# Patient Record
Sex: MALE | Race: WHITE | Employment: UNEMPLOYED | URBAN - METROPOLITAN AREA
[De-identification: names, ages, dates, MRNs, and addresses within clinical notes are randomized per-mention and may not be internally consistent; named-entity substitution may affect disease eponyms.]

---

## 2017-03-07 ENCOUNTER — ANTI-COAG VISIT (OUTPATIENT)
Dept: INTERNAL MEDICINE CLINIC | Age: 72
End: 2017-03-07

## 2017-03-07 DIAGNOSIS — I80.299 PHLEBITIS AND THROMBOPHLEBITIS OF OTHER DEEP VESSELS OF UNSPECIFIED LOWER EXTREMITY (HCC): Primary | ICD-10-CM

## 2017-03-07 LAB
INR BLD: 2.8
PT POC: NORMAL SEC
VALID INTERNAL CONTROL?: YES

## 2017-03-07 NOTE — PROGRESS NOTES
Mr. Sharyle Laura is here today for anticoagulation monitoring for the diagnosis of Atrial Fibrillation. His INR goal is 2.0-3.0 and his current Coumadin dose is 21.88 mg weekly. Today's findings include an INR of 2.8 (normal INR range 0.8-1.2). Considering Mr. Escobedo's past history, todays findings, and per the coumadin policy/protocol, Mr. Paola Holter was instructed to take Coumadin as follows,  Continue 21.88 mg weekly. He was also instructed to schedule an appointment in 3 weeks prior to leaving for an INR check. A full discussion of the nature of anticoagulants has been carried out. A full discussion of the need for frequent and regular monitoring, precise dosage adjustment and compliance was stressed. Side effects of potential bleeding were discussed and Mr. Paola Holter was instructed to call [phone] if there are any signs of abnormal bleeding. Mr. Paola Holter was instructed to avoid any OTC items containing aspirin or ibuprofen and prior to starting any new OTC products to consult with his physician or pharmacist to ensure no drug interactions are present. Mr. Paola Holter was instructed to avoid any major changes in his general diet and to avoid alcohol consumption. .    Mr. Paola Holter was provided a literature booklet, \"Treatment with Warfarin (Coumadin)\", that includes topics on understanding coumadin therapy, drug interaction considerations, vitamin K and coumadin use, interactions with foods and supplements containing vitamin K, and the use of herbal products. Mr. Paola Holter verbalized his understanding of all instructions and will call the office with any questions, concerns, or signs of abnormal bleeding or blood clot.

## 2017-03-31 ENCOUNTER — HOSPITAL ENCOUNTER (OUTPATIENT)
Dept: LAB | Age: 72
Discharge: HOME | End: 2017-03-31
Payer: MEDICARE

## 2017-03-31 ENCOUNTER — OFFICE VISIT (OUTPATIENT)
Dept: INTERNAL MEDICINE CLINIC | Age: 72
End: 2017-03-31

## 2017-03-31 VITALS
DIASTOLIC BLOOD PRESSURE: 60 MMHG | TEMPERATURE: 97.4 F | HEIGHT: 64 IN | SYSTOLIC BLOOD PRESSURE: 110 MMHG | RESPIRATION RATE: 16 BRPM | HEART RATE: 56 BPM | OXYGEN SATURATION: 97 % | WEIGHT: 214 LBS | BODY MASS INDEX: 36.54 KG/M2

## 2017-03-31 DIAGNOSIS — I80.299 PHLEBITIS AND THROMBOPHLEBITIS OF OTHER DEEP VESSELS OF UNSPECIFIED LOWER EXTREMITY (HCC): ICD-10-CM

## 2017-03-31 DIAGNOSIS — E78.2 MIXED HYPERLIPIDEMIA: ICD-10-CM

## 2017-03-31 DIAGNOSIS — Z12.5 SCREENING FOR PROSTATE CANCER: ICD-10-CM

## 2017-03-31 DIAGNOSIS — E11.9 CONTROLLED TYPE 2 DIABETES MELLITUS WITHOUT COMPLICATION, WITHOUT LONG-TERM CURRENT USE OF INSULIN (HCC): Primary | ICD-10-CM

## 2017-03-31 DIAGNOSIS — E11.9 CONTROLLED TYPE 2 DIABETES MELLITUS WITHOUT COMPLICATION, WITHOUT LONG-TERM CURRENT USE OF INSULIN (HCC): ICD-10-CM

## 2017-03-31 LAB
CHOLEST SERPL-MCNC: 220 MG/DL
EST. AVERAGE GLUCOSE BLD GHB EST-MCNC: 146 MG/DL
HBA1C MFR BLD: 6.7 % (ref 4.2–5.6)
HDLC SERPL-MCNC: 48 MG/DL (ref 40–60)
HDLC SERPL: 4.6 {RATIO} (ref 0–5)
INR PPP: 2.3 (ref 0.8–1.2)
LDLC SERPL CALC-MCNC: 131.8 MG/DL (ref 0–100)
LIPID PROFILE,FLP: ABNORMAL
PROTHROMBIN TIME: 23.9 SEC (ref 11.5–15.2)
PSA SERPL-MCNC: 0.5 NG/ML (ref 0–4)
TRIGL SERPL-MCNC: 201 MG/DL (ref ?–150)
VLDLC SERPL CALC-MCNC: 40.2 MG/DL

## 2017-03-31 PROCEDURE — 84153 ASSAY OF PSA TOTAL: CPT | Performed by: INTERNAL MEDICINE

## 2017-03-31 PROCEDURE — 86803 HEPATITIS C AB TEST: CPT | Performed by: INTERNAL MEDICINE

## 2017-03-31 PROCEDURE — 80061 LIPID PANEL: CPT | Performed by: INTERNAL MEDICINE

## 2017-03-31 PROCEDURE — 83036 HEMOGLOBIN GLYCOSYLATED A1C: CPT | Performed by: INTERNAL MEDICINE

## 2017-03-31 PROCEDURE — 85610 PROTHROMBIN TIME: CPT | Performed by: INTERNAL MEDICINE

## 2017-03-31 RX ORDER — CETIRIZINE HCL 10 MG
TABLET ORAL
COMMUNITY
End: 2019-04-26 | Stop reason: ALTCHOICE

## 2017-03-31 NOTE — PROGRESS NOTES
The patient presents to the office today with the chief complaint of Diabetes Mellitus    HPI    The patient remains on oral medications for type II diabetes mellitus. he does not check blood sugars at home. The patient has not had any low sugars. The patient remains on medications for chronic phlebitis in his legs. he is tolerating the medications well. His protimes have remained therapeutic. His legs remain sore with varicose veins but overall they are doing well. Review of Systems   Respiratory: Negative for cough and shortness of breath. Cardiovascular: Positive for leg swelling. Negative for chest pain. Allergies   Allergen Reactions    Keflex [Cephalexin] Itching    Penicillin V Potassium Hives       Current Outpatient Prescriptions   Medication Sig Dispense Refill    dicyclomine (BENTYL) 10 mg capsule Take 1 Cap by mouth four (4) times daily. 120 Cap 5    captopril (CAPOTEN) 25 mg tablet Take 1 Tab by mouth two (2) times a day. 60 Tab 5    glimepiride (AMARYL) 2 mg tablet TAKE ONE TABLET BY MOUTH EVERY MORNING FOR DIABETES 30 Tab 5    nadolol (CORGARD) 40 mg tablet Take 0.5 Tabs by mouth daily. 15 Tab 5    metFORMIN (GLUCOPHAGE) 1,000 mg tablet Take 1 Tab by mouth two (2) times daily (with meals). 60 Tab 5    warfarin (COUMADIN) 2.5 mg tablet Take 2 tablets on Thursday and Friday (one tablet on the other days) or as directed. 40 Tab 5    tamsulosin (FLOMAX) 0.4 mg capsule Take 1 Cap by mouth daily. 30 Cap 5    hydroCHLOROthiazide (HYDRODIURIL) 25 mg tablet Take 1 Tab by mouth daily. 30 Tab 5    clotrimazole-betamethasone (LOTRISONE) topical cream Use a small amount to affected area twice daily 45 g 3    aspirin delayed-release 81 mg tablet Take  by mouth daily.          Past Medical History:   Diagnosis Date    Allergic rhinitis     Benign paroxysmal positional vertigo     Dermatophytosis     Hypertrophy (benign) of prostate     Phlebitis and thrombophlebitis of other deep vessels of unspecified lower extremity (HCC)     Type II diabetes mellitus ([facility] [address]        Past Surgical History:   Procedure Laterality Date    HX COLONOSCOPY      polyps       Social History     Social History    Marital status: UNKNOWN     Spouse name: N/A    Number of children: N/A    Years of education: N/A     Occupational History    Not on file. Social History Main Topics    Smoking status: Former Smoker    Smokeless tobacco: Never Used    Alcohol use No    Drug use: No    Sexual activity: No     Other Topics Concern    Not on file     Social History Narrative       Patient does not have an advanced directive on file    There were no vitals taken for this visit. Physical Exam   No Cervical Lymphadenopathy  No Supraclavicular Lymphadenopathy  Thyroid is Normal  Lungs are clear to ausculation and percussion  Heart:  S1 S2 are normal, No gallops, No mummers  No Carotid Bruits  Abdomen:  Normal Bowel Sounds. No tenderness. No masses. No Hepatomegaly or Splenomegly  LE:  Strong Pedal Pulses. No Edema      DM Foot:  Diabetic foot exam:     Left: Reflexes {Reflexes:94616}     Vibratory sensation {vibration:17649}    Proprioception {propriocept:16125}   Sharp/dull discrimination {sharp dull:28666}    Filament test {filament test:75861}   Pulse DP: { :27130}   Pulse PT: { :93454}   Deformities: {None/mild/yes:22901}  Right: Reflexes {Reflexes:78217}   Vibratory sensation {vibration:72454}   Proprioception {propriocept:13285}   Sharp/dull discrimination {sharp dull:00805}   Filament test {filament test:76062}   Pulse DP: { :98320}   Pulse PT: { :68742}   Deformities: {None/mild/yes:68367}      BMI:  {MU BMI REASON:728090020}. Anti-Coag visit on 03/07/2017   Component Date Value Ref Range Status    VALID INTERNAL CONTROL POC 03/07/2017 Yes   Final    INR POC 03/07/2017 2.8   Final       .No results found for any visits on 03/31/17. Assessment / Plan      ICD-10-CM ICD-9-CM    1. Controlled type 2 diabetes mellitus without complication, without long-term current use of insulin ([facility]) E11.9 250.00    2. Phlebitis and thrombophlebitis of other deep vessels of unspecified lower extremity ([facility]) I80.299 451.19          Follow-up Disposition: Not on File    I asked Pelon Valle. if he has any questions and I answered the questions. Terrance Valle. states that he understands the treatment plan and agrees with the treatment plan

## 2017-03-31 NOTE — MR AVS SNAPSHOT
Visit Information Date & Time Provider Department Dept. Phone Encounter #  
 3/31/2017  8:30 AM Bridger Kaufman MD [facility] [phone] [id number] Follow-up Instructions Return in about 7 months (around 10/31/2017). Your Appointments 10/27/2017  8:00 AM  
Follow Up with Bridger Kaufman MD  
[facility] Appt Note: 7mo  
 [address], [address] [place] [address]  
  
   
 [address], [address] [place] [address] Upcoming Health Maintenance Date Due Hepatitis C Screening 1945 DTaP/Tdap/Td series (1 - Tdap) 2/5/1968 FOBT Q 1 YEAR AGE 50-75 2/5/1997 ZOSTER VACCINE AGE 60> 2/5/2007 Pneumococcal 65+ Low/Medium Risk (1 of 2 - PCV13) 2/5/2012 MEDICARE YEARLY EXAM 2/5/2012 INFLUENZA AGE 9 TO ADULT 8/1/2016 HEMOGLOBIN A1C Q6M 3/9/2017 LIPID PANEL Q1 4/29/2017 FOOT EXAM Q1 8/3/2017 MICROALBUMIN Q1 9/9/2017 EYE EXAM RETINAL OR DILATED Q1 12/2/2017 GLAUCOMA SCREENING Q2Y 12/2/2018 Allergies as of 3/31/2017  Review Complete On: 3/31/2017 By: Bridger Kaufman MD  
  
 Severity Noted Reaction Type Reactions Keflex [Cephalexin]  04/29/2016    Itching Lonox [Diphenoxylate-atropine]  03/31/2017    Shortness of Breath Penicillin V Potassium    Hives Current Immunizations  Never Reviewed No immunizations on file. Not reviewed this visit You Were Diagnosed With   
  
 Codes Comments Controlled type 2 diabetes mellitus without complication, without long-term current use of insulin ([facility] [address]    -  Primary ICD-10-CM: E11.9 ICD-9-CM: 250.00 Phlebitis and thrombophlebitis of other deep vessels of unspecified lower extremity (HCC)     ICD-10-CM: Z25.569 ICD-9-CM: 451.19 Screening for prostate cancer     ICD-10-CM: Z12.5 ICD-9-CM: V76.44 Mixed hyperlipidemia     ICD-10-CM: E78.2 ICD-9-CM: 272.2 Vitals BP Pulse Temp Resp Height(growth percentile) 110/60 (BP 1 Location: Right arm, BP Patient Position: Sitting) (!) 56 97.4 °F (36.3 °C) (Tympanic) 16 5' 4\" (1.626 m) Weight(growth percentile) SpO2 BMI Smoking Status 214 lb (97.1 kg) 97% 36.73 kg/m2 Former Smoker BMI and BSA Data Body Mass Index Body Surface Area  
 36.73 kg/m 2 2.09 m 2 Preferred Pharmacy Pharmacy Name [facility] [address] [place] [address] [phone] Your Updated Medication List  
  
   
This list is accurate as of: 3/31/17  9:52 AM.  Always use your most recent med list.  
  
  
  
  
 aspirin delayed-release 81 mg tablet Take  by mouth daily. captopril 25 mg tablet Commonly known as:  CAPOTEN Take 1 Tab by mouth two (2) times a day. clotrimazole-betamethasone topical cream  
Commonly known as:  Humera Santos Use a small amount to affected area twice daily  
  
 dicyclomine 10 mg capsule Commonly known as:  BENTYL Take 1 Cap by mouth four (4) times daily. glimepiride 2 mg tablet Commonly known as:  AMARYL  
TAKE ONE TABLET BY MOUTH EVERY MORNING FOR DIABETES  
  
 hydroCHLOROthiazide 25 mg tablet Commonly known as:  HYDRODIURIL Take 1 Tab by mouth daily. metFORMIN 1,000 mg tablet Commonly known as:  GLUCOPHAGE Take 1 Tab by mouth two (2) times daily (with meals). nadolol 40 mg tablet Commonly known as:  CORGARD Take 0.5 Tabs by mouth daily. tamsulosin 0.4 mg capsule Commonly known as:  FLOMAX Take 1 Cap by mouth daily. warfarin 2.5 mg tablet Commonly known as:  COUMADIN Take 2 tablets on Thursday and Friday (one tablet on the other days) or as directed. ZyrTEC 10 mg tablet Generic drug:  cetirizine Take  by mouth. Follow-up Instructions Return in about 7 months (around 10/31/2017). Introducing [facility]! Padmini Gleason introduces [email or web address] patient portal. Now you can access parts of your medical record, email your doctor's office, and request medication refills online. 1. In your internet browser, go to [email or web address] 2. Click on the First Time User? Click Here link in the Sign In box. You will see the New Member Sign Up page. 3. Enter your [email or web address] Access Code exactly as it appears below. You will not need to use this code after youve completed the sign-up process. If you do not sign up before the expiration date, you must request a new code. · [email or web address] Access Code: [email or web address] Expires: 6/5/2017  5:28 PM 
 
4. Enter the last four digits of your Social Security Number (xxxx) and Date of Birth (mm/dd/yyyy) as indicated and click Submit. You will be taken to the next sign-up page. 5. Create a [email or web address] ID. This will be your [email or web address] login ID and cannot be changed, so think of one that is secure and easy to remember. 6. Create a [email or web address] password. You can change your password at any time. 7. Enter your Password Reset Question and Answer. This can be used at a later time if you forget your password. 8. Enter your e-mail address. You will receive e-mail notification when new information is available in [address] 9. Click Sign Up. You can now view and download portions of your medical record. 10. Click the Download Summary menu link to download a portable copy of your medical information. If you have questions, please visit the Frequently Asked Questions section of the [email or web address] website. Remember, [email or web address] is NOT to be used for urgent needs. For medical emergencies, dial 911. Now available from your iPhone and Android! Please provide this summary of care documentation to your next provider. Your primary care clinician is listed as Avel Zabala. If you have any questions after today's visit, please call [phone].

## 2017-03-31 NOTE — PROGRESS NOTES
1. Have you been to the ER, urgent care clinic since your last visit? Hospitalized since your last visit? No    2. Have you seen or consulted any other health care providers outside of the [address] [place] [address] since your last visit? Include any pap smears or colon screening.  No

## 2017-03-31 NOTE — PROGRESS NOTES
This is an Initial Medicare Annual Wellness Exam (AWV) (Performed 12 months after IPPE or effective date of Medicare Part B enrollment, Once in a lifetime)    I have reviewed the patient's medical history in detail and updated the computerized patient record. History   The patient remains on type II diabetes mellitus. He is not checking his sugars at home. He has no symptoms of low sugars. The patient remains on coumadin due to phlebitis. He denies LE pain    Past Medical History:   Diagnosis Date    Allergic rhinitis     Benign paroxysmal positional vertigo     Dermatophytosis     Hypertrophy (benign) of prostate     Phlebitis and thrombophlebitis of other deep vessels of unspecified lower extremity (HCC)     Type II diabetes mellitus (HCC)       Past Surgical History:   Procedure Laterality Date    HX COLONOSCOPY      polyps     Current Outpatient Prescriptions   Medication Sig Dispense Refill    cetirizine (ZYRTEC) 10 mg tablet Take  by mouth.  dicyclomine (BENTYL) 10 mg capsule Take 1 Cap by mouth four (4) times daily. 120 Cap 5    captopril (CAPOTEN) 25 mg tablet Take 1 Tab by mouth two (2) times a day. 60 Tab 5    glimepiride (AMARYL) 2 mg tablet TAKE ONE TABLET BY MOUTH EVERY MORNING FOR DIABETES 30 Tab 5    nadolol (CORGARD) 40 mg tablet Take 0.5 Tabs by mouth daily. 15 Tab 5    metFORMIN (GLUCOPHAGE) 1,000 mg tablet Take 1 Tab by mouth two (2) times daily (with meals). 60 Tab 5    warfarin (COUMADIN) 2.5 mg tablet Take 2 tablets on Thursday and Friday (one tablet on the other days) or as directed. 40 Tab 5    tamsulosin (FLOMAX) 0.4 mg capsule Take 1 Cap by mouth daily. 30 Cap 5    hydroCHLOROthiazide (HYDRODIURIL) 25 mg tablet Take 1 Tab by mouth daily. 30 Tab 5    clotrimazole-betamethasone (LOTRISONE) topical cream Use a small amount to affected area twice daily 45 g 3    aspirin delayed-release 81 mg tablet Take  by mouth daily.        Allergies   Allergen Reactions    Keflex [Cephalexin] Itching    Lonox [Diphenoxylate-Atropine] Shortness of Breath    Penicillin V Potassium Hives     Family History   Problem Relation Age of Onset    Cancer Mother     Heart Disease Father      Social History   Substance Use Topics    Smoking status: Former Smoker    Smokeless tobacco: Never Used    Alcohol use No     Patient Active Problem List   Diagnosis Code    Dermatophytosis B35.9    Allergic rhinitis J30.9    Phlebitis and thrombophlebitis of other deep vessels of unspecified lower extremity ([facility]) I80.299    Hypertrophy (benign) of prostate N40.0    Benign paroxysmal positional vertigo H81.10    Diabetes mellitus type 2, controlled ([facility]) E11.9         Depression Risk Factor Screening:     PHQ 2 / 9, over the last two weeks 4/29/2016   Little interest or pleasure in doing things Not at all   Feeling down, depressed or hopeless Not at all   Total Score PHQ 2 0     Alcohol Risk Factor Screening: On any occasion during the past 3 months, have you had more than 3 drinks containing alcohol? No    Do you average more than 7 drinks per week? No    Functional Ability and Level of Safety:     Hearing Loss   mild-to-moderate    Activities of Daily Living   Self-care. Requires assistance with: no ADLs    Fall Risk     Fall Risk Assessment, last 12 mths 4/29/2016   Able to walk? Yes   Fall in past 12 months? No     Abuse Screen   Patient is not abused    Review of Systems   A comprehensive review of systems was negative except for that written in the HPI.     Physical Examination     No exam data present    Evaluation of Cognitive Function:  Mood/affect:  neutral  Appearance: age appropriate  Family member/caregiver input: None    Visit Vitals    /60 (BP 1 Location: Right arm, BP Patient Position: Sitting)    Pulse (!) 56    Temp 97.4 °F (36.3 °C) (Tympanic)    Resp 16    Ht 5' 4\" (1.626 m)    Wt 214 lb (97.1 kg)    SpO2 97%    BMI 36.73 kg/m2     Neck: supple, symmetrical, trachea midline, no adenopathy, thyroid: not enlarged, symmetric, no tenderness/mass/nodules, no carotid bruit and no JVD  Back: symmetric, no curvature. ROM normal. No CVA tenderness. Lungs: clear to auscultation bilaterally  Chest wall: no tenderness  Heart: regular rate and rhythm, S1, S2 normal, no murmur, click, rub or gallop  Abdomen: soft, non-tender. Bowel sounds normal. No masses,  no organomegaly  Extremities: extremities normal, atraumatic, no cyanosis or edema  Pulses: 2+ and symmetric    Patient Care Team:  Ramsey Huang MD as PCP - General (Internal Medicine)    Advice/Referrals/Counseling   Education and counseling provided:  Are appropriate based on today's review and evaluation  The patient was advised and counseled regarding advanced directives    Assessment/Plan       ICD-10-CM ICD-9-CM    1. Controlled type 2 diabetes mellitus without complication, without long-term current use of insulin (HCC) E11.9 250.00 cetirizine (ZYRTEC) 10 mg tablet      HEPATITIS C AB      LIPID PANEL      PSA SCREENING (SCREENING)      HEMOGLOBIN A1C WITH EAG      MICROALBUMIN, UR, RAND W/ MICROALBUMIN/CREA RATIO   2. Phlebitis and thrombophlebitis of other deep vessels of unspecified lower extremity (HCC) I80.299 451.19 cetirizine (ZYRTEC) 10 mg tablet      HEPATITIS C AB      LIPID PANEL      PSA SCREENING (SCREENING)      HEMOGLOBIN A1C WITH EAG      MICROALBUMIN, UR, RAND W/ MICROALBUMIN/CREA RATIO      PROTHROMBIN TIME + INR   3. Screening for prostate cancer Z12.5 V76.44 cetirizine (ZYRTEC) 10 mg tablet      HEPATITIS C AB      LIPID PANEL      PSA SCREENING (SCREENING)      HEMOGLOBIN A1C WITH EAG      MICROALBUMIN, UR, RAND W/ MICROALBUMIN/CREA RATIO   4. Mixed hyperlipidemia E78.2 272.2 cetirizine (ZYRTEC) 10 mg tablet      HEPATITIS C AB      LIPID PANEL      PSA SCREENING (SCREENING)      HEMOGLOBIN A1C WITH EAG      MICROALBUMIN, UR, RAND W/ MICROALBUMIN/CREA RATIO   .   Labs  he was advised to continue his maintenance medications    Next appointment:  6 months    I asked Ernie Sandra Cha. if he has any questions and I answered the questions. Linda Pal Cha. states that he understands the treatment plan and agrees with the treatment plan

## 2017-04-03 LAB
HCV AB SER IA-ACNC: 0.1 INDEX
HCV AB SERPL QL IA: NEGATIVE
HCV COMMENT,HCGAC: NORMAL

## 2017-05-01 ENCOUNTER — ANTI-COAG VISIT (OUTPATIENT)
Dept: INTERNAL MEDICINE CLINIC | Age: 72
End: 2017-05-01

## 2017-05-01 DIAGNOSIS — I80.299 PHLEBITIS AND THROMBOPHLEBITIS OF OTHER DEEP VESSELS OF UNSPECIFIED LOWER EXTREMITY (HCC): ICD-10-CM

## 2017-05-01 LAB
INR BLD: 3.5
PT POC: NORMAL SEC
VALID INTERNAL CONTROL?: YES

## 2017-05-01 NOTE — PROGRESS NOTES
Mr. Conrad Frey is here today for anticoagulation monitoring for the diagnosis of DVT. His INR goal is 2.0-3.0 and his current Coumadin dose is 21.25 mg weekly. Today's findings include an INR of 3.5 (normal INR range 0.8-1.2). Considering Mr. Escobedo's past history, todays findings, and per the coumadin policy/protocol, Mr. Sherry Jenkins was instructed to take Coumadin as follows,  To hold two days then return to the same dose. He was also instructed to schedule an appointment in 1.5 weeks prior to leaving for an INR check. A full discussion of the nature of anticoagulants has been carried out. A full discussion of the need for frequent and regular monitoring, precise dosage adjustment and compliance was stressed. Side effects of potential bleeding were discussed and Mr. Sherry Jenkins was instructed to call [phone] if there are any signs of abnormal bleeding. Mr. Sherry Jenkins was instructed to avoid any OTC items containing aspirin or ibuprofen and prior to starting any new OTC products to consult with his physician or pharmacist to ensure no drug interactions are present. Mr. Sherry Jenkins was instructed to avoid any major changes in his general diet and to avoid alcohol consumption. .    Mr. Sherry Jenkins was provided a literature booklet, \"Treatment with Warfarin (Coumadin)\", that includes topics on understanding coumadin therapy, drug interaction considerations, vitamin K and coumadin use, interactions with foods and supplements containing vitamin K, and the use of herbal products. Mr. Sherry Jenkins verbalized his understanding of all instructions and will call the office with any questions, concerns, or signs of abnormal bleeding or blood clot.

## 2017-05-16 ENCOUNTER — ANTI-COAG VISIT (OUTPATIENT)
Dept: INTERNAL MEDICINE CLINIC | Age: 72
End: 2017-05-16

## 2017-05-16 DIAGNOSIS — I80.299 PHLEBITIS AND THROMBOPHLEBITIS OF OTHER DEEP VESSELS OF UNSPECIFIED LOWER EXTREMITY (HCC): ICD-10-CM

## 2017-05-16 LAB
INR BLD: 2.4
PT POC: NORMAL SEC
VALID INTERNAL CONTROL?: YES

## 2017-05-16 NOTE — PATIENT INSTRUCTIONS
Taking Warfarin Safely: Care Instructions  Your Care Instructions  Warfarin is a medicine that you take to prevent blood clots. It is often called a blood thinner. Doctors give warfarin (such as Coumadin) to reduce the risk of blood clots. You may be at risk for blood clots if you have atrial fibrillation or deep vein thrombosis. Some other health problems may also put you at risk. Warfarin slows the amount of time it takes for your blood to clot. It can cause bleeding problems. Even if you've been taking warfarin for a while, it's important to know how to take it safely. Foods and other medicines can affect the way warfarin works. Some can make warfarin work too well. This can cause bleeding problems. And some can make it work poorly, so that it does not prevent blood clots very well. You will need regular blood tests to check how long it takes for your blood to form a clot. This test is called a PT or prothrombin time test. The result of the test is called an INR level. Depending on the test results, your doctor or anticoagulation clinic may adjust your dose of warfarin. Follow-up care is a key part of your treatment and safety. Be sure to make and go to all appointments, and call your doctor if you are having problems. It's also a good idea to know your test results and keep a list of the medicines you take. How can you care for yourself at home? Take warfarin safely  · Take your warfarin at the same time each day. · If you miss a dose of warfarin, don't take an extra dose to make up for it. Your doctor can tell you exactly what to do so you don't take too much or too little. · Wear medical alert jewelry that lets others know that you take warfarin. You can buy this at most drugstores. · Don't take warfarin if you are pregnant or planning to get pregnant. Talk to your doctor about how you can prevent getting pregnant while you are taking it.   · Don't change your dose or stop taking warfarin unless your doctor tells you to. Effects of medicines and food on warfarin  · Don't start or stop taking any medicines, vitamins, or natural remedies unless you first talk to your doctor. Many medicines can affect how warfarin works. These include aspirin and other pain relievers, over-the-counter medicines, multivitamins, dietary supplements, and herbal products. · Tell all of your doctors and pharmacists that you take warfarin. Some prescription medicines can affect how warfarin works. · Keep the amount of vitamin K in your diet about the same from day to day. Do not suddenly eat a lot more or a lot less food that is rich in vitamin K than you usually do. Vitamin K affects how warfarin works and how your blood clots. Talk with your doctor before making big changes in your diet. Foods that have a lot of vitamin K include cooked green vegetables, such as:  ¨ Kale, spinach, turnip greens, gaby greens, Swiss chard, and mustard greens. ¨ [place] sprouts, broccoli, and asparagus. · Limit your use of alcohol. Avoid bleeding by preventing falls and injuries  · Wear slippers or shoes with nonskid soles. · Remove throw rugs and clutter. · Rearrange furniture and electrical cords to keep them out of walking paths. · Keep stairways, porches, and outside walkways well lit. Use night-lights in hallways and bathrooms. · Be extra careful when you work with sharp tools or knives. When should you call for help? Call 911 anytime you think you may need emergency care. For example, call if:  · You have a sudden, severe headache that is different from past headaches. Call your doctor now or seek immediate medical care if:  · You have any abnormal bleeding, such as:  ¨ Nosebleeds. ¨ Vaginal bleeding that is different (heavier, more frequent, at a different time of the month) than what you are used to. ¨ Bloody or black stools, or rectal bleeding. ¨ Bloody or pink urine.   Watch closely for changes in your health, and be sure to contact your doctor if you have any problems. Where can you learn more? Go to http://harsh-kirsten.info/. Enter C928 in the search box to learn more about \"Taking Warfarin Safely: Care Instructions. \"  Current as of: November 15, 2016  Content Version: 11.2  © 6696-1777 [employer]. Care instructions adapted under license by [employer] (which disclaims liability or warranty for this information). If you have questions about a medical condition or this instruction, always ask your healthcare professional. [address] any warranty or liability for your use of this information.

## 2017-05-16 NOTE — PROGRESS NOTES
Mr. 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 His INR goal is 2.0-3.0 and his current Coumadin dose is 2.5 mg and 3.75 mg alternating. Today's findings include an INR of 2.4 (normal INR range 0.8-1.2). Considering Mr. Escobedo's past history, todays findings, and per the coumadin policy/protocol, Mr. Dennie Precise was instructed to take Coumadin as follows,  2.5 mg and 3.75 mg alternating. He was also instructed to schedule an appointment in 2 weeks prior to leaving for an INR check. A full discussion of the nature of anticoagulants has been carried out. A full discussion of the need for frequent and regular monitoring, precise dosage adjustment and compliance was stressed. Side effects of potential bleeding were discussed and Mr. Dennie Precise was instructed to call [phone] if there are any signs of abnormal bleeding. Mr. Dennie Precise was instructed to avoid any OTC items containing aspirin or ibuprofen and prior to starting any new OTC products to consult with his physician or pharmacist to ensure no drug interactions are present. Mr. Dennie Precise was instructed to avoid any major changes in his general diet and to avoid alcohol consumption. .      Mr. Escobedo verbalized his understanding of all instructions and will call the office with any questions, concerns, or signs of abnormal bleeding or blood clot.

## 2017-05-30 ENCOUNTER — ANTI-COAG VISIT (OUTPATIENT)
Dept: INTERNAL MEDICINE CLINIC | Age: 72
End: 2017-05-30

## 2017-05-30 DIAGNOSIS — I10 ESSENTIAL HYPERTENSION WITH GOAL BLOOD PRESSURE LESS THAN 140/90: ICD-10-CM

## 2017-05-30 DIAGNOSIS — Z12.5 PROSTATE CANCER SCREENING: ICD-10-CM

## 2017-05-30 DIAGNOSIS — I80.299 PHLEBITIS AND THROMBOPHLEBITIS OF OTHER DEEP VESSELS OF UNSPECIFIED LOWER EXTREMITY (HCC): ICD-10-CM

## 2017-05-30 LAB
INR BLD: 2.9
PT POC: NORMAL SECONDS
VALID INTERNAL CONTROL?: YES

## 2017-05-30 RX ORDER — DICYCLOMINE HYDROCHLORIDE 10 MG/1
10 CAPSULE ORAL 4 TIMES DAILY
Qty: 120 CAP | Refills: 5 | Status: SHIPPED | OUTPATIENT
Start: 2017-05-30 | End: 2017-11-28 | Stop reason: SDUPTHER

## 2017-05-30 RX ORDER — WARFARIN 2.5 MG/1
TABLET ORAL
Qty: 40 TAB | Refills: 5 | Status: SHIPPED | OUTPATIENT
Start: 2017-05-30 | End: 2017-11-28 | Stop reason: SDUPTHER

## 2017-05-30 RX ORDER — METFORMIN HYDROCHLORIDE 1000 MG/1
1000 TABLET ORAL 2 TIMES DAILY WITH MEALS
Qty: 60 TAB | Refills: 5 | Status: SHIPPED | OUTPATIENT
Start: 2017-05-30 | End: 2017-11-28 | Stop reason: SDUPTHER

## 2017-05-30 RX ORDER — NADOLOL 40 MG/1
20 TABLET ORAL DAILY
Qty: 15 TAB | Refills: 5 | Status: SHIPPED | OUTPATIENT
Start: 2017-05-30 | End: 2017-11-28 | Stop reason: SDUPTHER

## 2017-05-30 RX ORDER — CAPTOPRIL 25 MG/1
25 TABLET ORAL 2 TIMES DAILY
Qty: 60 TAB | Refills: 5 | Status: SHIPPED | OUTPATIENT
Start: 2017-05-30 | End: 2017-11-28 | Stop reason: SDUPTHER

## 2017-05-30 RX ORDER — HYDROCHLOROTHIAZIDE 25 MG/1
25 TABLET ORAL DAILY
Qty: 30 TAB | Refills: 5 | Status: SHIPPED | OUTPATIENT
Start: 2017-05-30 | End: 2017-11-28 | Stop reason: SDUPTHER

## 2017-05-30 RX ORDER — TAMSULOSIN HYDROCHLORIDE 0.4 MG/1
0.4 CAPSULE ORAL DAILY
Qty: 30 CAP | Refills: 5 | Status: SHIPPED | OUTPATIENT
Start: 2017-05-30 | End: 2017-11-28 | Stop reason: SDUPTHER

## 2017-05-30 RX ORDER — GLIMEPIRIDE 2 MG/1
TABLET ORAL
Qty: 30 TAB | Refills: 5 | Status: SHIPPED | OUTPATIENT
Start: 2017-05-30 | End: 2017-11-28 | Stop reason: SDUPTHER

## 2017-05-30 NOTE — PROGRESS NOTES
Mr. Mir El is here today for anticoagulation monitoring for the diagnosis of Thrombophlebitis. His INR goal is 2.0-3.0 and his current Coumadin dose is 2.5 mg alternating with 3.7 mg.     Today's findings include an INR of 2.9 (normal INR range 0.8-1.2). Considering Mr. Escobedo's past history, todays findings, and per the coumadin policy/protocol, Mr. Rafy Lyn was instructed to take Coumadin as follows,  2.5 mg alternating with 3.7 mg.  He was also instructed to schedule an appointment in 2 1/2 weeks prior to leaving for an INR check. A full discussion of the nature of anticoagulants has been carried out. A full discussion of the need for frequent and regular monitoring, precise dosage adjustment and compliance was stressed. Side effects of potential bleeding were discussed and Mr. Rafy Lyn was instructed to call [phone] if there are any signs of abnormal bleeding. Mr. Rafy Lyn was instructed to avoid any OTC items containing aspirin or ibuprofen and prior to starting any new OTC products to consult with his physician or pharmacist to ensure no drug interactions are present. Mr. Rafy Lyn was instructed to avoid any major changes in his general diet and to avoid alcohol consumption. .      Mr. Escobedo verbalized his understanding of all instructions and will call the office with any questions, concerns, or signs of abnormal bleeding or blood clot.

## 2017-05-30 NOTE — PATIENT INSTRUCTIONS
Taking Warfarin Safely: Care Instructions  Your Care Instructions  Warfarin is a medicine that you take to prevent blood clots. It is often called a blood thinner. Doctors give warfarin (such as Coumadin) to reduce the risk of blood clots. You may be at risk for blood clots if you have atrial fibrillation or deep vein thrombosis. Some other health problems may also put you at risk. Warfarin slows the amount of time it takes for your blood to clot. It can cause bleeding problems. Even if you've been taking warfarin for a while, it's important to know how to take it safely. Foods and other medicines can affect the way warfarin works. Some can make warfarin work too well. This can cause bleeding problems. And some can make it work poorly, so that it does not prevent blood clots very well. You will need regular blood tests to check how long it takes for your blood to form a clot. This test is called a PT or prothrombin time test. The result of the test is called an INR level. Depending on the test results, your doctor or anticoagulation clinic may adjust your dose of warfarin. Follow-up care is a key part of your treatment and safety. Be sure to make and go to all appointments, and call your doctor if you are having problems. It's also a good idea to know your test results and keep a list of the medicines you take. How can you care for yourself at home? Take warfarin safely  · Take your warfarin at the same time each day. · If you miss a dose of warfarin, don't take an extra dose to make up for it. Your doctor can tell you exactly what to do so you don't take too much or too little. · Wear medical alert jewelry that lets others know that you take warfarin. You can buy this at most drugstores. · Don't take warfarin if you are pregnant or planning to get pregnant. Talk to your doctor about how you can prevent getting pregnant while you are taking it.   · Don't change your dose or stop taking warfarin unless your doctor tells you to. Effects of medicines and food on warfarin  · Don't start or stop taking any medicines, vitamins, or natural remedies unless you first talk to your doctor. Many medicines can affect how warfarin works. These include aspirin and other pain relievers, over-the-counter medicines, multivitamins, dietary supplements, and herbal products. · Tell all of your doctors and pharmacists that you take warfarin. Some prescription medicines can affect how warfarin works. · Keep the amount of vitamin K in your diet about the same from day to day. Do not suddenly eat a lot more or a lot less food that is rich in vitamin K than you usually do. Vitamin K affects how warfarin works and how your blood clots. Talk with your doctor before making big changes in your diet. Foods that have a lot of vitamin K include cooked green vegetables, such as:  ¨ Kale, spinach, turnip greens, gaby greens, Swiss chard, and mustard greens. ¨ [place] sprouts, broccoli, and asparagus. · Limit your use of alcohol. Avoid bleeding by preventing falls and injuries  · Wear slippers or shoes with nonskid soles. · Remove throw rugs and clutter. · Rearrange furniture and electrical cords to keep them out of walking paths. · Keep stairways, porches, and outside walkways well lit. Use night-lights in hallways and bathrooms. · Be extra careful when you work with sharp tools or knives. When should you call for help? Call 911 anytime you think you may need emergency care. For example, call if:  · You have a sudden, severe headache that is different from past headaches. Call your doctor now or seek immediate medical care if:  · You have any abnormal bleeding, such as:  ¨ Nosebleeds. ¨ Vaginal bleeding that is different (heavier, more frequent, at a different time of the month) than what you are used to. ¨ Bloody or black stools, or rectal bleeding. ¨ Bloody or pink urine.   Watch closely for changes in your health, and be sure to contact your doctor if you have any problems. Where can you learn more? Go to http://harsh-kirsten.info/. Enter Z827 in the search box to learn more about \"Taking Warfarin Safely: Care Instructions. \"  Current as of: November 15, 2016  Content Version: 11.2  © 3196-2488 [employer]. Care instructions adapted under license by [employer] (which disclaims liability or warranty for this information). If you have questions about a medical condition or this instruction, always ask your healthcare professional. [address] any warranty or liability for your use of this information.

## 2017-05-30 NOTE — TELEPHONE ENCOUNTER
Requested Prescriptions     Pending Prescriptions Disp Refills    captopril (CAPOTEN) 25 mg tablet 60 Tab 5     Sig: Take 1 Tab by mouth two (2) times a day.  nadolol (CORGARD) 40 mg tablet 15 Tab 5     Sig: Take 0.5 Tabs by mouth daily.  hydroCHLOROthiazide (HYDRODIURIL) 25 mg tablet 30 Tab 5     Sig: Take 1 Tab by mouth daily.  metFORMIN (GLUCOPHAGE) 1,000 mg tablet 60 Tab 5     Sig: Take 1 Tab by mouth two (2) times daily (with meals).  glimepiride (AMARYL) 2 mg tablet 30 Tab 5     Sig: TAKE ONE TABLET BY MOUTH EVERY MORNING FOR DIABETES    dicyclomine (BENTYL) 10 mg capsule 120 Cap 5     Sig: Take 1 Cap by mouth four (4) times daily.  tamsulosin (FLOMAX) 0.4 mg capsule 30 Cap 5     Sig: Take 1 Cap by mouth daily.  warfarin (COUMADIN) 2.5 mg tablet 40 Tab 5     Sig: Take 2 tablets on Thursday and Friday (one tablet on the other days) or as directed.

## 2017-06-22 ENCOUNTER — ANTI-COAG VISIT (OUTPATIENT)
Dept: INTERNAL MEDICINE CLINIC | Age: 72
End: 2017-06-22

## 2017-06-22 DIAGNOSIS — I80.299 PHLEBITIS AND THROMBOPHLEBITIS OF OTHER DEEP VESSELS OF UNSPECIFIED LOWER EXTREMITY (HCC): ICD-10-CM

## 2017-06-22 LAB
INR BLD: 2.3
PT POC: NORMAL SECONDS
VALID INTERNAL CONTROL?: YES

## 2017-06-22 NOTE — PROGRESS NOTES
Mr. Adriel Patiño is here today for anticoagulation monitoring for the diagnosis of thrombophlebitis. His INR goal is 2.0-3.0 and his current Coumadin dose is 2.3 and 3.75 alternating. Today's findings include an INR of 2.3 (normal INR range 0.8-1.2). Considering Mr. Escobedo's past history, todays findings, and per the coumadin policy/protocol, Mr. Carolyn Wooten was instructed to take Coumadin as follows,  2.3 mg and 3.75 mg alternating. He was also instructed to schedule an appointment in 3 weeks prior to leaving for an INR check. A full discussion of the nature of anticoagulants has been carried out. A full discussion of the need for frequent and regular monitoring, precise dosage adjustment and compliance was stressed. Side effects of potential bleeding were discussed and Mr. Carolyn Wooten was instructed to call [phone] if there are any signs of abnormal bleeding. Mr. Carolyn Wooten was instructed to avoid any OTC items containing aspirin or ibuprofen and prior to starting any new OTC products to consult with his physician or pharmacist to ensure no drug interactions are present. Mr. Carolyn Wooten was instructed to avoid any major changes in his general diet and to avoid alcohol consumption. .      Mr. Escobedo verbalized his understanding of all instructions and will call the office with any questions, concerns, or signs of abnormal bleeding or blood clot.

## 2017-06-22 NOTE — PATIENT INSTRUCTIONS
Taking Warfarin Safely: Care Instructions  Your Care Instructions  Warfarin is a medicine that you take to prevent blood clots. It is often called a blood thinner. Doctors give warfarin (such as Coumadin) to reduce the risk of blood clots. You may be at risk for blood clots if you have atrial fibrillation or deep vein thrombosis. Some other health problems may also put you at risk. Warfarin slows the amount of time it takes for your blood to clot. It can cause bleeding problems. Even if you've been taking warfarin for a while, it's important to know how to take it safely. Foods and other medicines can affect the way warfarin works. Some can make warfarin work too well. This can cause bleeding problems. And some can make it work poorly, so that it does not prevent blood clots very well. You will need regular blood tests to check how long it takes for your blood to form a clot. This test is called a PT or prothrombin time test. The result of the test is called an INR level. Depending on the test results, your doctor or anticoagulation clinic may adjust your dose of warfarin. Follow-up care is a key part of your treatment and safety. Be sure to make and go to all appointments, and call your doctor if you are having problems. It's also a good idea to know your test results and keep a list of the medicines you take. How can you care for yourself at home? Take warfarin safely  · Take your warfarin at the same time each day. · If you miss a dose of warfarin, don't take an extra dose to make up for it. Your doctor can tell you exactly what to do so you don't take too much or too little. · Wear medical alert jewelry that lets others know that you take warfarin. You can buy this at most drugstores. · Don't take warfarin if you are pregnant or planning to get pregnant. Talk to your doctor about how you can prevent getting pregnant while you are taking it.   · Don't change your dose or stop taking warfarin unless your doctor tells you to. Effects of medicines and food on warfarin  · Don't start or stop taking any medicines, vitamins, or natural remedies unless you first talk to your doctor. Many medicines can affect how warfarin works. These include aspirin and other pain relievers, over-the-counter medicines, multivitamins, dietary supplements, and herbal products. · Tell all of your doctors and pharmacists that you take warfarin. Some prescription medicines can affect how warfarin works. · Keep the amount of vitamin K in your diet about the same from day to day. Do not suddenly eat a lot more or a lot less food that is rich in vitamin K than you usually do. Vitamin K affects how warfarin works and how your blood clots. Talk with your doctor before making big changes in your diet. Foods that have a lot of vitamin K include cooked green vegetables, such as:  ¨ Kale, spinach, turnip greens, gaby greens, Swiss chard, and mustard greens. ¨ [place] sprouts, broccoli, and asparagus. · Limit your use of alcohol. Avoid bleeding by preventing falls and injuries  · Wear slippers or shoes with nonskid soles. · Remove throw rugs and clutter. · Rearrange furniture and electrical cords to keep them out of walking paths. · Keep stairways, porches, and outside walkways well lit. Use night-lights in hallways and bathrooms. · Be extra careful when you work with sharp tools or knives. When should you call for help? Call 911 anytime you think you may need emergency care. For example, call if:  · You have a sudden, severe headache that is different from past headaches. Call your doctor now or seek immediate medical care if:  · You have any abnormal bleeding, such as:  ¨ Nosebleeds. ¨ Vaginal bleeding that is different (heavier, more frequent, at a different time of the month) than what you are used to. ¨ Bloody or black stools, or rectal bleeding. ¨ Bloody or pink urine.   Watch closely for changes in your health, and be sure to contact your doctor if you have any problems. Where can you learn more? Go to http://harsh-kirsten.info/. Enter A694 in the search box to learn more about \"Taking Warfarin Safely: Care Instructions. \"  Current as of: November 15, 2016  Content Version: 11.3  © 6845-5482 [employer]. Care instructions adapted under license by [employer] (which disclaims liability or warranty for this information). If you have questions about a medical condition or this instruction, always ask your healthcare professional. [address] any warranty or liability for your use of this information.

## 2017-07-26 ENCOUNTER — ANTI-COAG VISIT (OUTPATIENT)
Dept: INTERNAL MEDICINE CLINIC | Age: 72
End: 2017-07-26

## 2017-07-26 DIAGNOSIS — I80.299 PHLEBITIS AND THROMBOPHLEBITIS OF OTHER DEEP VESSELS OF UNSPECIFIED LOWER EXTREMITY (HCC): ICD-10-CM

## 2017-07-26 LAB
INR BLD: 3.7
PT POC: NORMAL SECONDS
VALID INTERNAL CONTROL?: YES

## 2017-07-26 NOTE — PROGRESS NOTES
Mr. Tiffany Dumont is here today for anticoagulation monitoring for the diagnosis of thrombophlebitis. His INR goal is 2.0-3.0 and his current Coumadin dose is 2.5 mg and 3.75 mg alternating. Today's findings include an INR of 3.7 (normal INR range 0.8-1.2). Considering Mr. Escobedo's past history, todays findings, and per the coumadin policy/protocol, Mr. Sushil Villanueva was instructed to take Coumadin as follows,  Hold for 2 days then resume 2.5 mg and 3.75 mg alternating. He was also instructed to schedule an appointment in 2 weeks prior to leaving for an INR check. A full discussion of the nature of anticoagulants has been carried out. A full discussion of the need for frequent and regular monitoring, precise dosage adjustment and compliance was stressed. Side effects of potential bleeding were discussed and Mr. Sushil Villanueva was instructed to call [phone] if there are any signs of abnormal bleeding. Mr. Sushil Villanueva was instructed to avoid any OTC items containing aspirin or ibuprofen and prior to starting any new OTC products to consult with his physician or pharmacist to ensure no drug interactions are present. Mr. Sushil Villanueva was instructed to avoid any major changes in his general diet and to avoid alcohol consumption. .      Mr. Escobedo verbalized his understanding of all instructions and will call the office with any questions, concerns, or signs of abnormal bleeding or blood clot.

## 2017-07-26 NOTE — PATIENT INSTRUCTIONS
Taking Warfarin Safely: Care Instructions  Your Care Instructions  Warfarin is a medicine that you take to prevent blood clots. It is often called a blood thinner. Doctors give warfarin (such as Coumadin) to reduce the risk of blood clots. You may be at risk for blood clots if you have atrial fibrillation or deep vein thrombosis. Some other health problems may also put you at risk. Warfarin slows the amount of time it takes for your blood to clot. It can cause bleeding problems. Even if you've been taking warfarin for a while, it's important to know how to take it safely. Foods and other medicines can affect the way warfarin works. Some can make warfarin work too well. This can cause bleeding problems. And some can make it work poorly, so that it does not prevent blood clots very well. You will need regular blood tests to check how long it takes for your blood to form a clot. This test is called a PT or prothrombin time test. The result of the test is called an INR level. Depending on the test results, your doctor or anticoagulation clinic may adjust your dose of warfarin. Follow-up care is a key part of your treatment and safety. Be sure to make and go to all appointments, and call your doctor if you are having problems. It's also a good idea to know your test results and keep a list of the medicines you take. How can you care for yourself at home? Take warfarin safely  · Take your warfarin at the same time each day. · If you miss a dose of warfarin, don't take an extra dose to make up for it. Your doctor can tell you exactly what to do so you don't take too much or too little. · Wear medical alert jewelry that lets others know that you take warfarin. You can buy this at most drugstores. · Don't take warfarin if you are pregnant or planning to get pregnant. Talk to your doctor about how you can prevent getting pregnant while you are taking it.   · Don't change your dose or stop taking warfarin unless your doctor tells you to. Effects of medicines and food on warfarin  · Don't start or stop taking any medicines, vitamins, or natural remedies unless you first talk to your doctor. Many medicines can affect how warfarin works. These include aspirin and other pain relievers, over-the-counter medicines, multivitamins, dietary supplements, and herbal products. · Tell all of your doctors and pharmacists that you take warfarin. Some prescription medicines can affect how warfarin works. · Keep the amount of vitamin K in your diet about the same from day to day. Do not suddenly eat a lot more or a lot less food that is rich in vitamin K than you usually do. Vitamin K affects how warfarin works and how your blood clots. Talk with your doctor before making big changes in your diet. Foods that have a lot of vitamin K include cooked green vegetables, such as:  ¨ Kale, spinach, turnip greens, gaby greens, Swiss chard, and mustard greens. ¨ Limestone sprouts, broccoli, and asparagus. · Limit your use of alcohol. Avoid bleeding by preventing falls and injuries  · Wear slippers or shoes with nonskid soles. · Remove throw rugs and clutter. · Rearrange furniture and electrical cords to keep them out of walking paths. · Keep stairways, porches, and outside walkways well lit. Use night-lights in hallways and bathrooms. · Be extra careful when you work with sharp tools or knives. When should you call for help? Call 911 anytime you think you may need emergency care. For example, call if:  · You have a sudden, severe headache that is different from past headaches. Call your doctor now or seek immediate medical care if:  · You have any abnormal bleeding, such as:  ¨ Nosebleeds. ¨ Vaginal bleeding that is different (heavier, more frequent, at a different time of the month) than what you are used to. ¨ Bloody or black stools, or rectal bleeding. ¨ Bloody or pink urine.   Watch closely for changes in your health, and be sure to contact your doctor if you have any problems. Where can you learn more? Go to http://harsh-kirsten.info/. Enter Y235 in the search box to learn more about \"Taking Warfarin Safely: Care Instructions. \"  Current as of: November 15, 2016  Content Version: 11.3  © 4403-2199 [employer]. Care instructions adapted under license by [employer] (which disclaims liability or warranty for this information). If you have questions about a medical condition or this instruction, always ask your healthcare professional. [address] any warranty or liability for your use of this information.

## 2017-08-07 ENCOUNTER — OFFICE VISIT (OUTPATIENT)
Dept: INTERNAL MEDICINE CLINIC | Age: 72
End: 2017-08-07

## 2017-08-07 ENCOUNTER — HOSPITAL ENCOUNTER (OUTPATIENT)
Dept: LAB | Age: 72
Discharge: HOME | End: 2017-08-07
Payer: MEDICARE

## 2017-08-07 VITALS
TEMPERATURE: 100.2 F | DIASTOLIC BLOOD PRESSURE: 50 MMHG | HEART RATE: 97 BPM | SYSTOLIC BLOOD PRESSURE: 120 MMHG | OXYGEN SATURATION: 96 % | RESPIRATION RATE: 16 BRPM | HEIGHT: 64 IN

## 2017-08-07 DIAGNOSIS — N30.01 ACUTE CYSTITIS WITH HEMATURIA: ICD-10-CM

## 2017-08-07 DIAGNOSIS — N30.01 ACUTE CYSTITIS WITH HEMATURIA: Primary | ICD-10-CM

## 2017-08-07 DIAGNOSIS — Z79.01 ANTICOAGULANT LONG-TERM USE: ICD-10-CM

## 2017-08-07 LAB
BILIRUB UR QL STRIP: NORMAL
GLUCOSE UR-MCNC: NEGATIVE MG/DL
INR BLD: 2.8
KETONES P FAST UR STRIP-MCNC: NORMAL MG/DL
PH UR STRIP: 5 [PH] (ref 4.6–8)
PROT UR QL STRIP: NORMAL MG/DL
PT POC: NORMAL SECONDS
SP GR UR STRIP: 1.03 (ref 1–1.03)
UA UROBILINOGEN AMB POC: NORMAL (ref 0.2–1)
URINALYSIS CLARITY POC: NORMAL
URINALYSIS COLOR POC: YELLOW
URINE BLOOD POC: NORMAL
URINE LEUKOCYTES POC: NORMAL
URINE NITRITES POC: POSITIVE
VALID INTERNAL CONTROL?: YES

## 2017-08-07 PROCEDURE — 87186 SC STD MICRODIL/AGAR DIL: CPT | Performed by: NURSE PRACTITIONER

## 2017-08-07 PROCEDURE — 87077 CULTURE AEROBIC IDENTIFY: CPT | Performed by: NURSE PRACTITIONER

## 2017-08-07 PROCEDURE — 87086 URINE CULTURE/COLONY COUNT: CPT | Performed by: NURSE PRACTITIONER

## 2017-08-07 RX ORDER — CIPROFLOXACIN 250 MG/1
250 TABLET, FILM COATED ORAL EVERY 12 HOURS
Qty: 14 TAB | Refills: 0 | Status: SHIPPED | OUTPATIENT
Start: 2017-08-07 | End: 2017-08-14 | Stop reason: ALTCHOICE

## 2017-08-07 NOTE — PROGRESS NOTES
Ace Alfonso is a 81 y.o. male presenting today for Urinary Burning and Urgency  . HPI:  Ace Alfonso presents to the office today for dysuria, frequency and urgency x 3 days. He denies any hematuria. Review of Systems   Constitutional: Positive for fever. Respiratory: Negative for cough and shortness of breath. Cardiovascular: Negative for chest pain and palpitations. Gastrointestinal: Negative for nausea. Genitourinary: Positive for dysuria, frequency and urgency. Negative for hematuria. Allergies   Allergen Reactions    Keflex [Cephalexin] Itching    Lonox [Diphenoxylate-Atropine] Shortness of Breath    Penicillin V Potassium Hives       Current Outpatient Prescriptions   Medication Sig Dispense Refill    ciprofloxacin HCl (CIPRO) 250 mg tablet Take 1 Tab by mouth every twelve (12) hours for 7 days. 14 Tab 0    captopril (CAPOTEN) 25 mg tablet Take 1 Tab by mouth two (2) times a day. 60 Tab 5    nadolol (CORGARD) 40 mg tablet Take 0.5 Tabs by mouth daily. 15 Tab 5    hydroCHLOROthiazide (HYDRODIURIL) 25 mg tablet Take 1 Tab by mouth daily. 30 Tab 5    metFORMIN (GLUCOPHAGE) 1,000 mg tablet Take 1 Tab by mouth two (2) times daily (with meals). 60 Tab 5    glimepiride (AMARYL) 2 mg tablet TAKE ONE TABLET BY MOUTH EVERY MORNING FOR DIABETES 30 Tab 5    dicyclomine (BENTYL) 10 mg capsule Take 1 Cap by mouth four (4) times daily. 120 Cap 5    tamsulosin (FLOMAX) 0.4 mg capsule Take 1 Cap by mouth daily. 30 Cap 5    warfarin (COUMADIN) 2.5 mg tablet Take 2 tablets on Thursday and Friday (one tablet on the other days) or as directed. 40 Tab 5    cetirizine (ZYRTEC) 10 mg tablet Take  by mouth.  clotrimazole-betamethasone (LOTRISONE) topical cream Use a small amount to affected area twice daily 45 g 3    aspirin delayed-release 81 mg tablet Take  by mouth daily.          Past Medical History:   Diagnosis Date    Allergic rhinitis     Benign paroxysmal positional vertigo     Dermatophytosis     Hypertrophy (benign) of prostate     Phlebitis and thrombophlebitis of other deep vessels of unspecified lower extremity (HCC)     Type II diabetes mellitus (HCC)        Past Surgical History:   Procedure Laterality Date    HX COLONOSCOPY      polyps       Social History     Social History    Marital status: UNKNOWN     Spouse name: N/A    Number of children: N/A    Years of education: N/A     Occupational History    Not on file. Social History Main Topics    Smoking status: Former Smoker    Smokeless tobacco: Never Used    Alcohol use No    Drug use: No    Sexual activity: No     Other Topics Concern    Not on file     Social History Narrative       Patient does not have an advanced directive on file    Vitals:    08/07/17 1317 08/07/17 1334   BP: 128/50 120/50   Pulse: 97    Resp: 16    Temp: 100.2 °F (37.9 °C)    TempSrc: Tympanic    SpO2: 96%    Height: 5' 4\" (1.626 m)    PainSc:   3    PainLoc: Groin        Physical Exam   Constitutional: No distress. Cardiovascular: Normal rate, regular rhythm and normal heart sounds. Pulmonary/Chest: Effort normal and breath sounds normal.   Nursing note and vitals reviewed.       Office Visit on 08/07/2017   Component Date Value Ref Range Status    VALID INTERNAL CONTROL POC 08/07/2017 Yes   Final    INR POC 08/07/2017 2.8   Final   Anti-Coag visit on 07/26/2017   Component Date Value Ref Range Status    VALID INTERNAL CONTROL POC 07/26/2017 Yes   Final    INR POC 07/26/2017 3.7   Final   Anti-Coag visit on 06/22/2017   Component Date Value Ref Range Status    VALID INTERNAL CONTROL POC 06/22/2017 Yes   Final    INR POC 06/22/2017 2.3   Final   Anti-Coag visit on 05/30/2017   Component Date Value Ref Range Status    VALID INTERNAL CONTROL POC 05/30/2017 Yes   Final    INR POC 05/30/2017 2.9   Final   Anti-Coag visit on 05/16/2017   Component Date Value Ref Range Status    VALID INTERNAL CONTROL POC 05/16/2017 Yes   Final    INR POC 05/16/2017 2.4   Final       .  Results for orders placed or performed in visit on 08/07/17   AMB POC PT/INR   Result Value Ref Range    VALID INTERNAL CONTROL POC Yes     Prothrombin time (POC)  seconds    INR POC 2.8        Assessment / Plan:      ICD-10-CM ICD-9-CM    1. Acute cystitis with hematuria N30.01 595.0 ciprofloxacin HCl (CIPRO) 250 mg tablet      AMB POC URINALYSIS DIP STICK AUTO W/O MICRO      CULTURE, URINE   2. Anticoagulant long-term use Z79.01 V58.61 AMB POC PT/INR     Cipro rx given  Due to the UTI this Quinolone is recommended by me as part of the treatment plan for Pelon Watt. .  I explained to Mahendra Stewart. Efrem Child. that the FDA has placed a \"black box warning\" regarding this medication due a risk of tendon injury or rupture due to this medication and a risk of mental confusion. I explained that it is my professional opinion that the potential benefit of this Quinolone to treat the UTI outweighs the risk of tendon injury or confusion. For this illness the other therapeutic options of different antibiotics were explained. These options avoid the risk of tendon rupture but have the potential drawback of not treating the UTI infection. The Pelon Escobedo Jr.'s questions were answered. Sandra Escobedo Jr.stated that he understands the issues and agrees to take the Quinolone. The patient was advised to limit physical activity although it was explained that a tendon injury may still occur. The patient was advised to discontinue the Quinolone and to call me if pain in her tendons or confusion. PT-INR- 2.8 today  Will repeat INR Friday  F/u prn      Follow-up Disposition:  Return if symptoms worsen or fail to improve. I asked the patient if he  had any questions and answered his  questions.   The patient stated that he understands the treatment plan and agrees with the treatment plan

## 2017-08-09 LAB
BACTERIA SPEC CULT: ABNORMAL
SERVICE CMNT-IMP: ABNORMAL

## 2017-08-14 ENCOUNTER — OFFICE VISIT (OUTPATIENT)
Dept: INTERNAL MEDICINE CLINIC | Age: 72
End: 2017-08-14

## 2017-08-14 VITALS
DIASTOLIC BLOOD PRESSURE: 70 MMHG | HEIGHT: 64 IN | HEART RATE: 53 BPM | BODY MASS INDEX: 36.54 KG/M2 | WEIGHT: 214 LBS | OXYGEN SATURATION: 98 % | TEMPERATURE: 96.3 F | SYSTOLIC BLOOD PRESSURE: 126 MMHG | RESPIRATION RATE: 18 BRPM

## 2017-08-14 DIAGNOSIS — E11.9 CONTROLLED TYPE 2 DIABETES MELLITUS WITHOUT COMPLICATION, WITHOUT LONG-TERM CURRENT USE OF INSULIN (HCC): Primary | ICD-10-CM

## 2017-08-14 DIAGNOSIS — I80.299 PHLEBITIS AND THROMBOPHLEBITIS OF OTHER DEEP VESSELS OF UNSPECIFIED LOWER EXTREMITY (HCC): ICD-10-CM

## 2017-08-14 DIAGNOSIS — N40.0 BENIGN NON-NODULAR PROSTATIC HYPERPLASIA, PRESENCE OF LOWER URINARY TRACT SYMPTOMS UNSPECIFIED: ICD-10-CM

## 2017-08-14 RX ORDER — CIPROFLOXACIN 500 MG/1
TABLET ORAL
Qty: 14 TAB | Refills: 0 | Status: SHIPPED | OUTPATIENT
Start: 2017-08-14 | End: 2017-10-27 | Stop reason: ALTCHOICE

## 2017-08-14 NOTE — PROGRESS NOTES
1. Have you been to the ER, urgent care clinic since your last visit? Hospitalized since your last visit? No    2. Have you seen or consulted any other health care providers outside of the [address] [place] [address] since your last visit? Include any pap smears or colon screening.  No

## 2017-08-14 NOTE — PATIENT INSTRUCTIONS
Health Maintenance Due   Topic Date Due    DTaP/Tdap/Td  (1 - Tdap) 02/05/1968    Stool testing for trace blood  02/05/1997    Shingles Vaccine  12/05/2006    Pneumococcal Vaccine (1 of 2 - PCV13) 02/05/2012    Flu Vaccine  08/01/2017    Diabetic Foot Care  08/03/2017    Albumin Urine Test  09/09/2017

## 2017-08-14 NOTE — MR AVS SNAPSHOT
Visit Information Date & Time Provider Department Dept. Phone Encounter #  
 8/14/2017 12:00 PM Monty Frazier MD [facility] [phone] [id number] Your Appointments 10/27/2017  8:00 AM  
Follow Up with Monty Frazier MD  
[address] [facility]) Appt Note: 7mo  
 [address] [facility] [address], [address]  
  
   
 [address] [facility], [address] [facility] [address] Upcoming Health Maintenance Date Due DTaP/Tdap/Td series (1 - Tdap) 2/5/1968 FOBT Q 1 YEAR AGE 50-75 2/5/1997 ZOSTER VACCINE AGE 60> 12/5/2006 Pneumococcal 65+ Low/Medium Risk (1 of 2 - PCV13) 2/5/2012 INFLUENZA AGE 9 TO ADULT 8/1/2017 FOOT EXAM Q1 8/3/2017 MICROALBUMIN Q1 9/9/2017 HEMOGLOBIN A1C Q6M 9/30/2017 EYE EXAM RETINAL OR DILATED Q1 12/2/2017 LIPID PANEL Q1 3/31/2018 MEDICARE YEARLY EXAM 4/1/2018 GLAUCOMA SCREENING Q2Y 12/2/2018 Allergies as of 8/14/2017  Review Complete On: 8/14/2017 By: Yuli Berkowitz LPN Severity Noted Reaction Type Reactions Keflex [Cephalexin]  04/29/2016    Itching Lonox [Diphenoxylate-atropine]  03/31/2017    Shortness of Breath Penicillin V Potassium    Hives Current Immunizations  Never Reviewed No immunizations on file. Not reviewed this visit Vitals BP Pulse Temp Resp Height(growth percentile) 126/70 (BP 1 Location: Left arm, BP Patient Position: Sitting) (!) 53 96.3 °F (35.7 °C) (Tympanic) 18 5' 4\" (1.626 m) Weight(growth percentile) SpO2 BMI Smoking Status 214 lb (97.1 kg) 98% 36.73 kg/m2 Former Smoker BMI and BSA Data Body Mass Index Body Surface Area  
 36.73 kg/m 2 2.09 m 2 Preferred Pharmacy Pharmacy Name Phone [facility] - [facility], [address] [place] [address] [phone] Your Updated Medication List  
  
   
 This list is accurate as of: 17  1:02 PM.  Always use your most recent med list.  
  
  
  
  
 aspirin delayed-release 81 mg tablet Take  by mouth daily. captopril 25 mg tablet Commonly known as:  CAPOTEN Take 1 Tab by mouth two (2) times a day. ciprofloxacin HCl 500 mg tablet Commonly known as:  CIPRO  
1 tablet twice per day  
  
 clotrimazole-betamethasone topical cream  
Commonly known as:  Katelynen Reecarrol Use a small amount to affected area twice daily  
  
 dicyclomine 10 mg capsule Commonly known as:  BENTYL Take 1 Cap by mouth four (4) times daily. glimepiride 2 mg tablet Commonly known as:  AMARYL  
TAKE ONE TABLET BY MOUTH EVERY MORNING FOR DIABETES  
  
 hydroCHLOROthiazide 25 mg tablet Commonly known as:  HYDRODIURIL Take 1 Tab by mouth daily. metFORMIN 1,000 mg tablet Commonly known as:  GLUCOPHAGE Take 1 Tab by mouth two (2) times daily (with meals). nadolol 40 mg tablet Commonly known as:  CORGARD Take 0.5 Tabs by mouth daily. tamsulosin 0.4 mg capsule Commonly known as:  FLOMAX Take 1 Cap by mouth daily. warfarin 2.5 mg tablet Commonly known as:  COUMADIN Take 2 tablets on Thursday and Friday (one tablet on the other days) or as directed. ZyrTEC 10 mg tablet Generic drug:  cetirizine Take  by mouth. Prescriptions Sent to Pharmacy Refills  
 ciprofloxacin HCl (CIPRO) 500 mg tablet 0 Si tablet twice per day Class: Normal  
 Pharmacy: [facility] [address] Ph #: [phone] 2017 Details Sun Mon  Fri Sat  
    1  
  
  
  
   2  
  
  
  
   3  
  
  
  
   4  
  
  
  
   5  
  
  
  
  
  6  
  
  
  
   7  
  
  
  
   8  
  
  
  
   9  
  
  
  
   10  
  
  
  
   11  
  
  
  
   12  
  
  
  
  
  13  
  
  
  
   14  
  
2.5 mg  
See details    15  
  
3.75 mg  
  
   16  
  
2.5 mg  
  
   17  
  
3.75 mg  
  
 18  
  
2.5 mg  
  
   19  
  
3.75 mg  
  
  
  20  
  
2.5 mg  
  
   21  
  
3.75 mg  
  
   22  
  
2.5 mg  
  
   23  
  
3.75 mg  
  
   24  
  
2.5 mg  
  
   25  
  
3.75 mg  
  
   26  
  
2.5 mg  
  
  
  27  
  
3.75 mg  
  
   28  
  
2.5 mg  
  
   29  
  
3.75 mg  
  
   30  
  
2.5 mg  
  
   31  
  
3.75 mg Date Details 08/14 This INR check Date of next INR: No date specified How to take your warfarin dose To take:  2.5 mg Take one of the 2.5 mg tablets. To take:  3.75 mg Take one and a half of the 2.5 mg tablets. Patient Instructions Health Maintenance Due Topic Date Due  
 DTaP/Tdap/Td  (1 - Tdap) 02/05/1968  Stool testing for trace blood  02/05/1997  Shingles Vaccine  12/05/2006  Pneumococcal Vaccine (1 of 2 - PCV13) 02/05/2012  Flu Vaccine  08/01/2017 [facility] Diabetic Foot Care  08/03/2017  Albumin Urine Test  09/09/2017 Introducing [facility]! Sharon Peguero introduces [employer] patient portal. Now you can access parts of your medical record, email your doctor's office, and request medication refills online. 1. In your internet browser, go to [email or web address] 2. Click on the First Time User? Click Here link in the Sign In box. You will see the New Member Sign Up page. 3. Enter your [employer]i Access Code exactly as it appears below. You will not need to use this code after youve completed the sign-up process. If you do not sign up before the expiration date, you must request a new code. · [employer] Access Code: [email or web address] Expires: 9/20/2017  2:47 PM 
 
4. Enter the last four digits of your Social Security Number (xxxx) and Date of Birth (mm/dd/yyyy) as indicated and click Submit. You will be taken to the next sign-up page. 5. Create a Zoyi ID. This will be your Zoyi login ID and cannot be changed, so think of one that is secure and easy to remember. 6. Create a [employer] password. You can change your password at any time. 7. Enter your Password Reset Question and Answer. This can be used at a later time if you forget your password. 8. Enter your e-mail address. You will receive e-mail notification when new information is available in [address] 9. Click Sign Up. You can now view and download portions of your medical record. 10. Click the Download Summary menu link to download a portable copy of your medical information. If you have questions, please visit the Frequently Asked Questions section of the [employer] website. Remember, [employer] is NOT to be used for urgent needs. For medical emergencies, dial 911. Now available from your iPhone and Android! Please provide this summary of care documentation to your next provider. Your primary care clinician is listed as Constantino Crane. If you have any questions after today's visit, please call [phone].

## 2017-08-17 NOTE — PROGRESS NOTES
The patient presents to the office today with the chief complaint of polyuria    HPI    The patient remains on antibiotics for polyuria and dysuria. The symptoms have improved but persist.  The patient denies fever. The patient remains on Metformin due to type II diabetes mellitus. The patient remains on Coumadin due to chronic DVT in his legs. His protimes have been doing ok      Review of Systems   Respiratory: Negative for shortness of breath. Cardiovascular: Negative for leg swelling. Genitourinary: Positive for dysuria (Mild dysuria) and frequency. Allergies   Allergen Reactions    Keflex [Cephalexin] Itching    Lonox [Diphenoxylate-Atropine] Shortness of Breath    Penicillin V Potassium Hives       Current Outpatient Prescriptions   Medication Sig Dispense Refill    ciprofloxacin HCl (CIPRO) 500 mg tablet 1 tablet twice per day 14 Tab 0    captopril (CAPOTEN) 25 mg tablet Take 1 Tab by mouth two (2) times a day. 60 Tab 5    nadolol (CORGARD) 40 mg tablet Take 0.5 Tabs by mouth daily. 15 Tab 5    hydroCHLOROthiazide (HYDRODIURIL) 25 mg tablet Take 1 Tab by mouth daily. 30 Tab 5    metFORMIN (GLUCOPHAGE) 1,000 mg tablet Take 1 Tab by mouth two (2) times daily (with meals). 60 Tab 5    glimepiride (AMARYL) 2 mg tablet TAKE ONE TABLET BY MOUTH EVERY MORNING FOR DIABETES 30 Tab 5    dicyclomine (BENTYL) 10 mg capsule Take 1 Cap by mouth four (4) times daily. 120 Cap 5    tamsulosin (FLOMAX) 0.4 mg capsule Take 1 Cap by mouth daily. 30 Cap 5    warfarin (COUMADIN) 2.5 mg tablet Take 2 tablets on Thursday and Friday (one tablet on the other days) or as directed. 40 Tab 5    cetirizine (ZYRTEC) 10 mg tablet Take  by mouth.  clotrimazole-betamethasone (LOTRISONE) topical cream Use a small amount to affected area twice daily 45 g 3    aspirin delayed-release 81 mg tablet Take  by mouth daily.          Past Medical History:   Diagnosis Date    Allergic rhinitis     Benign paroxysmal positional vertigo     Dermatophytosis     Hypertrophy (benign) of prostate     Phlebitis and thrombophlebitis of other deep vessels of unspecified lower extremity (HCC)     Type II diabetes mellitus (HCC)        Past Surgical History:   Procedure Laterality Date    HX COLONOSCOPY      polyps       Social History     Social History    Marital status: UNKNOWN     Spouse name: N/A    Number of children: N/A    Years of education: N/A     Occupational History    Not on file. Social History Main Topics    Smoking status: Former Smoker    Smokeless tobacco: Never Used    Alcohol use No    Drug use: No    Sexual activity: No     Other Topics Concern    Not on file     Social History Narrative       Patient does not have an advanced directive on file    Visit Vitals    /70 (BP 1 Location: Left arm, BP Patient Position: Sitting)    Pulse (!) 53    Temp 96.3 °F (35.7 °C) (Tympanic)    Resp 18    Ht 5' 4\" (1.626 m)    Wt 214 lb (97.1 kg)    SpO2 98%    BMI 36.73 kg/m2       Physical Exam   No Cervical Lymphadenopathy  No Supraclavicular Lymphadenopathy  Thyroid is Normal  Lungs are clear to ausculation and percussion  Heart:  S1 S2 are normal, No gallops, No mummers  No Carotid Bruits  Abdomen:  Normal Bowel Sounds. No tenderness. No masses. No Hepatomegaly or Splenomegly  LE:  Strong Pedal Pulses. No Edema  Diabetic foot exam:     Left: Reflexes 2+     Vibratory sensation normal    Proprioception normal   Sharp/dull discrimination normal    Filament test normal sensation with micro filament   Pulse DP: 2+ (normal)   Pulse PT: 2+ (normal)   Deformities: None  Right: Reflexes 2+   Vibratory sensation normal   Proprioception normal   Sharp/dull discrimination normal   Filament test normal sensation with micro filament   Pulse DP: 2+ (normal)   Pulse PT: 2+ (normal)   Deformities: None    BMI:  I have reviewed/discussed the above normal BMI with the patient.   I have recommended the following interventions: dietary management education, guidance, and counseling . [facility] Outpatient Visit on 08/07/2017   Component Date Value Ref Range Status    Special Requests: 08/07/2017 NO SPECIAL REQUESTS    Final    Culture result: 08/07/2017 >100,000 COLONIES/mL ESCHERICHIA COLI*   Final   Office Visit on 08/07/2017   Component Date Value Ref Range Status    VALID INTERNAL CONTROL POC 08/07/2017 Yes   Final    INR POC 08/07/2017 2.8   Final    Color (UA POC) 08/07/2017 Yellow   Final    Clarity (UA POC) 08/07/2017 Cloudy   Final    Glucose (UA POC) 08/07/2017 Negative  Negative Final    Bilirubin (UA POC) 08/07/2017 1+  Negative Final    Ketones (UA POC) 08/07/2017 Trace  Negative Final    Specific gravity (UA POC) 08/07/2017 1.030  1.001 - 1.035 Final    Blood (UA POC) 08/07/2017 2+  Negative Final    pH (UA POC) 08/07/2017 5.0  4.6 - 8.0 Final    Protein (UA POC) 08/07/2017 2+  Negative mg/dL Final    Urobilinogen (UA POC) 08/07/2017 1 mg/dL  0.2 - 1 Final    Nitrites (UA POC) 08/07/2017 Positive  Negative Final    Leukocyte esterase (UA POC) 08/07/2017 Trace  Negative Final   Anti-Coag visit on 07/26/2017   Component Date Value Ref Range Status    VALID INTERNAL CONTROL POC 07/26/2017 Yes   Final    INR POC 07/26/2017 3.7   Final   Anti-Coag visit on 06/22/2017   Component Date Value Ref Range Status    VALID INTERNAL CONTROL POC 06/22/2017 Yes   Final    INR POC 06/22/2017 2.3   Final   Anti-Coag visit on 05/30/2017   Component Date Value Ref Range Status    VALID INTERNAL CONTROL POC 05/30/2017 Yes   Final    INR POC 05/30/2017 2.9   Final   Anti-Coag visit on 05/16/2017   Component Date Value Ref Range Status    VALID INTERNAL CONTROL POC 05/16/2017 Yes   Final    INR POC 05/16/2017 2.4   Final       .No results found for any visits on 08/14/17. Assessment / Plan      ICD-10-CM ICD-9-CM    1.  Controlled type 2 diabetes mellitus without complication, without long-term current use of insulin (HCC) E11.9 250.00  DIABETES FOOT EXAM   2. Benign non-nodular prostatic hyperplasia, presence of lower urinary tract symptoms unspecified N40.0 600.90    3. Phlebitis and thrombophlebitis of other deep vessels of unspecified lower extremity ([facility]) I80.299 451.19      Continue Cipro for now  he was advised to continue his maintenance medications    Follow-up Disposition:  Return in about 4 months (around 12/14/2017). I asked Meg Nova. Henrine Purpura. if he has any questions and I answered the questions. Pauline Cheek  Henrine Purpura. states that he understands the treatment plan and agrees with the treatment plan

## 2017-09-01 ENCOUNTER — ANTI-COAG VISIT (OUTPATIENT)
Dept: INTERNAL MEDICINE CLINIC | Age: 72
End: 2017-09-01

## 2017-09-01 DIAGNOSIS — I80.299 PHLEBITIS AND THROMBOPHLEBITIS OF OTHER DEEP VESSELS OF UNSPECIFIED LOWER EXTREMITY (HCC): ICD-10-CM

## 2017-09-01 LAB
INR BLD: 4.4
PT POC: NORMAL SECONDS
VALID INTERNAL CONTROL?: YES

## 2017-09-01 NOTE — PATIENT INSTRUCTIONS
Taking Warfarin Safely: Care Instructions  Your Care Instructions  Warfarin is a medicine that you take to prevent blood clots. It is often called a blood thinner. Doctors give warfarin (such as Coumadin) to reduce the risk of blood clots. You may be at risk for blood clots if you have atrial fibrillation or deep vein thrombosis. Some other health problems may also put you at risk. Warfarin slows the amount of time it takes for your blood to clot. It can cause bleeding problems. Even if you've been taking warfarin for a while, it's important to know how to take it safely. Foods and other medicines can affect the way warfarin works. Some can make warfarin work too well. This can cause bleeding problems. And some can make it work poorly, so that it does not prevent blood clots very well. You will need regular blood tests to check how long it takes for your blood to form a clot. This test is called a PT or prothrombin time test. The result of the test is called an INR level. Depending on the test results, your doctor or anticoagulation clinic may adjust your dose of warfarin. Follow-up care is a key part of your treatment and safety. Be sure to make and go to all appointments, and call your doctor if you are having problems. It's also a good idea to know your test results and keep a list of the medicines you take. How can you care for yourself at home? Take warfarin safely  · Take your warfarin at the same time each day. · If you miss a dose of warfarin, don't take an extra dose to make up for it. Your doctor can tell you exactly what to do so you don't take too much or too little. · Wear medical alert jewelry that lets others know that you take warfarin. You can buy this at most drugstores. · Don't take warfarin if you are pregnant or planning to get pregnant. Talk to your doctor about how you can prevent getting pregnant while you are taking it.   · Don't change your dose or stop taking warfarin unless your doctor tells you to. Effects of medicines and food on warfarin  · Don't start or stop taking any medicines, vitamins, or natural remedies unless you first talk to your doctor. Many medicines can affect how warfarin works. These include aspirin and other pain relievers, over-the-counter medicines, multivitamins, dietary supplements, and herbal products. · Tell all of your doctors and pharmacists that you take warfarin. Some prescription medicines can affect how warfarin works. · Keep the amount of vitamin K in your diet about the same from day to day. Do not suddenly eat a lot more or a lot less food that is rich in vitamin K than you usually do. Vitamin K affects how warfarin works and how your blood clots. Talk with your doctor before making big changes in your diet. Foods that have a lot of vitamin K include cooked green vegetables, such as:  ¨ Kale, spinach, turnip greens, gaby greens, Swiss chard, and mustard greens. ¨ [place] sprouts, broccoli, and asparagus. · Limit your use of alcohol. Avoid bleeding by preventing falls and injuries  · Wear slippers or shoes with nonskid soles. · Remove throw rugs and clutter. · Rearrange furniture and electrical cords to keep them out of walking paths. · Keep stairways, porches, and outside walkways well lit. Use night-lights in hallways and bathrooms. · Be extra careful when you work with sharp tools or knives. When should you call for help? Call 911 anytime you think you may need emergency care. For example, call if:  · You have a sudden, severe headache that is different from past headaches. Call your doctor now or seek immediate medical care if:  · You have any abnormal bleeding, such as:  ¨ Nosebleeds. ¨ Vaginal bleeding that is different (heavier, more frequent, at a different time of the month) than what you are used to. ¨ Bloody or black stools, or rectal bleeding. ¨ Bloody or pink urine.   Watch closely for changes in your health, and be sure to contact your doctor if you have any problems. Where can you learn more? Go to http://harsh-kirsten.info/. Enter Q136 in the search box to learn more about \"Taking Warfarin Safely: Care Instructions. \"  Current as of: November 15, 2016  Content Version: 11.3  © 0617-7329 [employer]. Care instructions adapted under license by [employer] (which disclaims liability or warranty for this information). If you have questions about a medical condition or this instruction, always ask your healthcare professional. [address] any warranty or liability for your use of this information.

## 2017-09-01 NOTE — PROGRESS NOTES
Mr. Mendoza Rosenthal is here today for anticoagulation monitoring for the diagnosis of phlebitis. His INR goal is 2.0-3.0 and his current Coumadin dose is 2.5 mg and 3.75 mg alternating. Today's findings include an INR of 4.4 (normal INR range 0.8-1.2). Considering Mr. Escobedo's past history, todays findings, and per the coumadin policy/protocol, Mr. Smitha Bridges was instructed to take Coumadin as follows,  Hold Fri, Sat, and Sun then resume 2.5 mg and 3.75 mg alternating. He was also instructed to schedule an appointment in 1 weeks prior to leaving for an INR check. A full discussion of the nature of anticoagulants has been carried out. A full discussion of the need for frequent and regular monitoring, precise dosage adjustment and compliance was stressed. Side effects of potential bleeding were discussed and Mr. Smitha Bridges was instructed to call [phone] if there are any signs of abnormal bleeding. Mr. Smitha Bridges was instructed to avoid any OTC items containing aspirin or ibuprofen and prior to starting any new OTC products to consult with his physician or pharmacist to ensure no drug interactions are present. Mr. Smitha Bridges was instructed to avoid any major changes in his general diet and to avoid alcohol consumption. .    Mr. Smitha Bridges was provided a literature booklet, \"Treatment with Warfarin (Coumadin)\", that includes topics on understanding coumadin therapy, drug interaction considerations, vitamin K and coumadin use, interactions with foods and supplements containing vitamin K, and the use of herbal products. Mr. Smitha Bridges verbalized his understanding of all instructions and will call the office with any questions, concerns, or signs of abnormal bleeding or blood clot.

## 2017-09-08 ENCOUNTER — ANTI-COAG VISIT (OUTPATIENT)
Dept: INTERNAL MEDICINE CLINIC | Age: 72
End: 2017-09-08

## 2017-09-08 DIAGNOSIS — I80.299 PHLEBITIS AND THROMBOPHLEBITIS OF OTHER DEEP VESSELS OF UNSPECIFIED LOWER EXTREMITY (HCC): ICD-10-CM

## 2017-09-08 LAB
INR BLD: 1.6
PT POC: NORMAL SECONDS
VALID INTERNAL CONTROL?: YES

## 2017-09-08 NOTE — PROGRESS NOTES
Mr. Tiffany Dumont is here today for anticoagulation monitoring for the diagnosis of DVT. His INR goal is 2.0-3.0 and his current Coumadin dose has been on hold for three days then 2.5 mg daily. Today's findings include an INR of 1.6 (normal INR range 0.8-1.2). Considering Mr. Escobedo's past history, todays findings, and per the coumadin policy/protocol, Mr. Sushil Villanueva was instructed to take Coumadin as follows,  Take 2.5 mg on all days except for Tue/Fri take 3.75 mg. He was also instructed to schedule an appointment in 2 weeks prior to leaving for an INR check. A full discussion of the nature of anticoagulants has been carried out. A full discussion of the need for frequent and regular monitoring, precise dosage adjustment and compliance was stressed. Side effects of potential bleeding were discussed and Mr. Sushil Villanueva was instructed to call [phone] if there are any signs of abnormal bleeding. Mr. Sushil Villanueva was instructed to avoid any OTC items containing aspirin or ibuprofen and prior to starting any new OTC products to consult with his physician or pharmacist to ensure no drug interactions are present. Mr. Sushil Villanueva was instructed to avoid any major changes in his general diet and to avoid alcohol consumption. .    Mr. Sushil Villanueva was provided a literature booklet, \"Treatment with Warfarin (Coumadin)\", that includes topics on understanding coumadin therapy, drug interaction considerations, vitamin K and coumadin use, interactions with foods and supplements containing vitamin K, and the use of herbal products. Mr. Sushil Villanueva verbalized his understanding of all instructions and will call the office with any questions, concerns, or signs of abnormal bleeding or blood clot.

## 2017-09-22 ENCOUNTER — ANTI-COAG VISIT (OUTPATIENT)
Dept: INTERNAL MEDICINE CLINIC | Age: 72
End: 2017-09-22

## 2017-09-22 DIAGNOSIS — I80.299 PHLEBITIS AND THROMBOPHLEBITIS OF OTHER DEEP VESSELS OF UNSPECIFIED LOWER EXTREMITY (HCC): ICD-10-CM

## 2017-09-22 LAB
INR BLD: 3.4
PT POC: NORMAL SECONDS
VALID INTERNAL CONTROL?: YES

## 2017-10-12 ENCOUNTER — ANTI-COAG VISIT (OUTPATIENT)
Dept: INTERNAL MEDICINE CLINIC | Age: 72
End: 2017-10-12

## 2017-10-12 DIAGNOSIS — I80.299 PHLEBITIS AND THROMBOPHLEBITIS OF OTHER DEEP VESSELS OF UNSPECIFIED LOWER EXTREMITY (HCC): ICD-10-CM

## 2017-10-12 LAB
INR BLD: 2.4
PT POC: NORMAL SECONDS
VALID INTERNAL CONTROL?: YES

## 2017-10-27 ENCOUNTER — HOSPITAL ENCOUNTER (OUTPATIENT)
Dept: LAB | Age: 72
Discharge: HOME | End: 2017-10-27
Payer: MEDICARE

## 2017-10-27 ENCOUNTER — OFFICE VISIT (OUTPATIENT)
Dept: INTERNAL MEDICINE CLINIC | Age: 72
End: 2017-10-27

## 2017-10-27 VITALS
SYSTOLIC BLOOD PRESSURE: 114 MMHG | HEART RATE: 57 BPM | OXYGEN SATURATION: 98 % | RESPIRATION RATE: 16 BRPM | DIASTOLIC BLOOD PRESSURE: 62 MMHG | HEIGHT: 64 IN | WEIGHT: 203 LBS | TEMPERATURE: 97.4 F | BODY MASS INDEX: 34.66 KG/M2

## 2017-10-27 DIAGNOSIS — E11.9 CONTROLLED TYPE 2 DIABETES MELLITUS WITHOUT COMPLICATION, WITHOUT LONG-TERM CURRENT USE OF INSULIN (HCC): Primary | ICD-10-CM

## 2017-10-27 DIAGNOSIS — I80.299 PHLEBITIS AND THROMBOPHLEBITIS OF OTHER DEEP VESSELS OF UNSPECIFIED LOWER EXTREMITY (HCC): ICD-10-CM

## 2017-10-27 DIAGNOSIS — E11.9 CONTROLLED TYPE 2 DIABETES MELLITUS WITHOUT COMPLICATION, WITHOUT LONG-TERM CURRENT USE OF INSULIN (HCC): ICD-10-CM

## 2017-10-27 DIAGNOSIS — E78.2 MIXED HYPERLIPIDEMIA: ICD-10-CM

## 2017-10-27 LAB
ALBUMIN SERPL-MCNC: 3.9 G/DL (ref 3.4–5)
ALBUMIN/GLOB SERPL: 1.1 {RATIO} (ref 0.8–1.7)
ALP SERPL-CCNC: 59 U/L (ref 45–117)
ALT SERPL-CCNC: 23 U/L (ref 16–61)
ANION GAP SERPL CALC-SCNC: 10 MMOL/L (ref 3–18)
AST SERPL-CCNC: 15 U/L (ref 15–37)
BASOPHILS # BLD: 0.1 K/UL (ref 0–0.06)
BASOPHILS NFR BLD: 1 % (ref 0–2)
BILIRUB SERPL-MCNC: 0.4 MG/DL (ref 0.2–1)
BUN SERPL-MCNC: 24 MG/DL (ref 7–18)
BUN/CREAT SERPL: 21 (ref 12–20)
CALCIUM SERPL-MCNC: 8.6 MG/DL (ref 8.5–10.1)
CHLORIDE SERPL-SCNC: 103 MMOL/L (ref 100–108)
CHOLEST SERPL-MCNC: 208 MG/DL
CO2 SERPL-SCNC: 26 MMOL/L (ref 21–32)
CREAT SERPL-MCNC: 1.13 MG/DL (ref 0.6–1.3)
DIFFERENTIAL METHOD BLD: ABNORMAL
EOSINOPHIL # BLD: 0.5 K/UL (ref 0–0.4)
EOSINOPHIL NFR BLD: 6 % (ref 0–5)
ERYTHROCYTE [DISTWIDTH] IN BLOOD BY AUTOMATED COUNT: 13.8 % (ref 11.6–14.5)
EST. AVERAGE GLUCOSE BLD GHB EST-MCNC: 140 MG/DL
GLOBULIN SER CALC-MCNC: 3.4 G/DL (ref 2–4)
GLUCOSE SERPL-MCNC: 136 MG/DL (ref 74–99)
HBA1C MFR BLD: 6.5 % (ref 4.2–5.6)
HCT VFR BLD AUTO: 39.2 % (ref 36–48)
HDLC SERPL-MCNC: 43 MG/DL (ref 40–60)
HDLC SERPL: 4.8 {RATIO} (ref 0–5)
HGB BLD-MCNC: 13 G/DL (ref 13–16)
INR PPP: 1.8 (ref 0.8–1.2)
LDLC SERPL CALC-MCNC: 121.6 MG/DL (ref 0–100)
LIPID PROFILE,FLP: ABNORMAL
LYMPHOCYTES # BLD: 2 K/UL (ref 0.9–3.6)
LYMPHOCYTES NFR BLD: 24 % (ref 21–52)
MCH RBC QN AUTO: 31.7 PG (ref 24–34)
MCHC RBC AUTO-ENTMCNC: 33.2 G/DL (ref 31–37)
MCV RBC AUTO: 95.6 FL (ref 74–97)
MONOCYTES # BLD: 0.5 K/UL (ref 0.05–1.2)
MONOCYTES NFR BLD: 6 % (ref 3–10)
NEUTS SEG # BLD: 5.3 K/UL (ref 1.8–8)
NEUTS SEG NFR BLD: 63 % (ref 40–73)
PLATELET # BLD AUTO: 129 K/UL (ref 135–420)
PMV BLD AUTO: 11.6 FL (ref 9.2–11.8)
POTASSIUM SERPL-SCNC: 4.5 MMOL/L (ref 3.5–5.5)
PROT SERPL-MCNC: 7.3 G/DL (ref 6.4–8.2)
PROTHROMBIN TIME: 20.5 SEC (ref 11.5–15.2)
RBC # BLD AUTO: 4.1 M/UL (ref 4.7–5.5)
SODIUM SERPL-SCNC: 139 MMOL/L (ref 136–145)
TRIGL SERPL-MCNC: 217 MG/DL (ref ?–150)
VLDLC SERPL CALC-MCNC: 43.4 MG/DL
WBC # BLD AUTO: 8.3 K/UL (ref 4.6–13.2)

## 2017-10-27 PROCEDURE — 80053 COMPREHEN METABOLIC PANEL: CPT | Performed by: INTERNAL MEDICINE

## 2017-10-27 PROCEDURE — 85025 COMPLETE CBC W/AUTO DIFF WBC: CPT | Performed by: INTERNAL MEDICINE

## 2017-10-27 PROCEDURE — 85610 PROTHROMBIN TIME: CPT | Performed by: INTERNAL MEDICINE

## 2017-10-27 PROCEDURE — 82043 UR ALBUMIN QUANTITATIVE: CPT | Performed by: INTERNAL MEDICINE

## 2017-10-27 PROCEDURE — 80061 LIPID PANEL: CPT | Performed by: INTERNAL MEDICINE

## 2017-10-27 PROCEDURE — 83036 HEMOGLOBIN GLYCOSYLATED A1C: CPT | Performed by: INTERNAL MEDICINE

## 2017-10-27 RX ORDER — CLINDAMYCIN HYDROCHLORIDE 300 MG/1
300 CAPSULE ORAL 3 TIMES DAILY
COMMUNITY
End: 2018-04-20 | Stop reason: ALTCHOICE

## 2017-10-27 NOTE — MR AVS SNAPSHOT
Visit Information Date & Time Provider Department Dept. Phone Encounter #  
 10/27/2017  8:00 AM Luanne Lomeli MD [facility] [place] [phone] [id number] Follow-up Instructions Return in about 7 months (around 5/27/2018). Your Appointments 4/20/2018  8:00 AM  
Follow Up with Luanne Lomeli MD  
[address]) Appt Note: 6mo  
 [address], [address] [facility] [address]  
  
   
 [address], [address] [facility] [address] Upcoming Health Maintenance Date Due DTaP/Tdap/Td series (1 - Tdap) 2/5/1968 FOBT Q 1 YEAR AGE 50-75 2/5/1997 ZOSTER VACCINE AGE 60> 12/5/2006 Pneumococcal 65+ Low/Medium Risk (1 of 2 - PCV13) 2/5/2012 INFLUENZA AGE 9 TO ADULT 8/1/2017 MICROALBUMIN Q1 9/9/2017 HEMOGLOBIN A1C Q6M 9/30/2017 EYE EXAM RETINAL OR DILATED Q1 12/2/2017 LIPID PANEL Q1 3/31/2018 MEDICARE YEARLY EXAM 4/1/2018 FOOT EXAM Q1 8/16/2018 GLAUCOMA SCREENING Q2Y 12/2/2018 Allergies as of 10/27/2017  Review Complete On: 8/16/2017 By: Luanne Lomeli MD  
  
 Severity Noted Reaction Type Reactions Ampicillin High 10/27/2017   Topical Hives Became allergic 30 years ago. Keflex [Cephalexin]  04/29/2016    Itching Lonox [Diphenoxylate-atropine]  03/31/2017    Shortness of Breath Penicillin V Potassium    Hives Current Immunizations  Never Reviewed No immunizations on file. Not reviewed this visit You Were Diagnosed With   
  
 Codes Comments Controlled type 2 diabetes mellitus without complication, without long-term current use of insulin ([facility] [address]    -  Primary ICD-10-CM: E11.9 ICD-9-CM: 250.00 Phlebitis and thrombophlebitis of other deep vessels of unspecified lower extremity (HCC)     ICD-10-CM: X08.729 ICD-9-CM: 451.19 Mixed hyperlipidemia     ICD-10-CM: E78.2 ICD-9-CM: 272.2 Vitals BP Pulse Temp Resp Height(growth percentile) 114/62 (BP 1 Location: Left arm, BP Patient Position: Sitting) (!) 57 97.4 °F (36.3 °C) (Tympanic) 16 5' 4\" (1.626 m) Weight(growth percentile) SpO2 BMI Smoking Status 203 lb (92.1 kg) 98% 34.84 kg/m2 Former Smoker BMI and BSA Data Body Mass Index Body Surface Area 34.84 kg/m 2 2.04 m 2 Preferred Pharmacy Pharmacy Name [facility] [address] [place] [address] [phone] Your Updated Medication List  
  
   
This list is accurate as of: 10/27/17  9:34 AM.  Always use your most recent med list.  
  
  
  
  
 aspirin delayed-release 81 mg tablet Take  by mouth daily. captopril 25 mg tablet Commonly known as:  CAPOTEN Take 1 Tab by mouth two (2) times a day. clindamycin 300 mg capsule Commonly known as:  CLEOCIN Take 300 mg by mouth three (3) times daily. clotrimazole-betamethasone topical cream  
Commonly known as:  Hope Lipa Use a small amount to affected area twice daily  
  
 dicyclomine 10 mg capsule Commonly known as:  BENTYL Take 1 Cap by mouth four (4) times daily. glimepiride 2 mg tablet Commonly known as:  AMARYL  
TAKE ONE TABLET BY MOUTH EVERY MORNING FOR DIABETES  
  
 hydroCHLOROthiazide 25 mg tablet Commonly known as:  HYDRODIURIL Take 1 Tab by mouth daily. metFORMIN 1,000 mg tablet Commonly known as:  GLUCOPHAGE Take 1 Tab by mouth two (2) times daily (with meals). nadolol 40 mg tablet Commonly known as:  CORGARD Take 0.5 Tabs by mouth daily. tamsulosin 0.4 mg capsule Commonly known as:  FLOMAX Take 1 Cap by mouth daily. warfarin 2.5 mg tablet Commonly known as:  COUMADIN Take 2 tablets on Thursday and Friday (one tablet on the other days) or as directed. ZyrTEC 10 mg tablet Generic drug:  cetirizine Take  by mouth. Follow-up Instructions Return in about 7 months (around 5/27/2018). Patient Instructions Health Maintenance Due Topic Date Due  
 DTaP/Tdap/Td  (1 - Tdap) 02/05/1968  Stool testing for trace blood  02/05/1997  Shingles Vaccine  12/05/2006  Pneumococcal Vaccine (1 of 2 - PCV13) 02/05/2012  Flu Vaccine  08/01/2017  Albumin Urine Test  09/09/2017  Hemoglobin A1C    09/30/2017 Introducing \[facility] [place] [facility]\"" & HEALTH SERVICES! Bekah Todd introduces [facility] patient portal. Now you can access parts of your medical record, email your doctor's office, and request medication refills online. 1. In your internet browser, go to [email or web address] 2. Click on the First Time User? Click Here link in the Sign In box. You will see the New Member Sign Up page. 3. Enter your [facility] Access Code exactly as it appears below. You will not need to use this code after youve completed the sign-up process. If you do not sign up before the expiration date, you must request a new code. · [facility] Access Code: [email or web address] Expires: 1/25/2018  9:34 AM 
 
4. Enter the last four digits of your Social Security Number (xxxx) and Date of Birth (mm/dd/yyyy) as indicated and click Submit. You will be taken to the next sign-up page. 5. Create a [facility] ID. This will be your [facility] login ID and cannot be changed, so think of one that is secure and easy to remember. 6. Create a [facility] password. You can change your password at any time. 7. Enter your Password Reset Question and Answer. This can be used at a later time if you forget your password. 8. Enter your e-mail address. You will receive e-mail notification when new information is available in [address] 9. Click Sign Up. You can now view and download portions of your medical record. 10. Click the Download Summary menu link to download a portable copy of your medical information.  
 
If you have questions, please visit the Frequently Asked Questions section of the [employer]. Remember, [employer] is NOT to be used for urgent needs. For medical emergencies, dial 911. Now available from your iPhone and Android! Please provide this summary of care documentation to your next provider. Your primary care clinician is listed as Fredrick Or. If you have any questions after today's visit, please call [phone].

## 2017-10-27 NOTE — PROGRESS NOTES
1. Have you been to the ER, urgent care clinic since your last visit? Hospitalized since your last visit? No    2. Have you seen or consulted any other health care providers outside of the [address] [place] [address] since your last visit? Include any pap smears or colon screening.  Dr. Rajni Tran - getting a tooth pulled on Nov 7th

## 2017-10-27 NOTE — PATIENT INSTRUCTIONS
Health Maintenance Due   Topic Date Due    DTaP/Tdap/Td  (1 - Tdap) 02/05/1968    Stool testing for trace blood  02/05/1997    Shingles Vaccine  12/05/2006    Pneumococcal Vaccine (1 of 2 - PCV13) 02/05/2012    Flu Vaccine  08/01/2017    Albumin Urine Test  09/09/2017    Hemoglobin A1C    09/30/2017

## 2017-10-28 LAB
CREAT UR-MCNC: 81.29 MG/DL (ref 30–125)
MICROALBUMIN UR-MCNC: 11.9 MG/DL (ref 0–3)
MICROALBUMIN/CREAT UR-RTO: 146 MG/G (ref 0–30)

## 2017-10-28 NOTE — PROGRESS NOTES
Verify patients name and date of birth.patient drawn without any problems. Verify patients name and date of birth.patient drawn without any problems.

## 2017-10-29 NOTE — PROGRESS NOTES
The patient presents to the office today with the chief complaint of Diabetes Mellitus    HPI    The patient remains on oral medications for type II diabetes mellitus. he does not check his blood sugars at home. The patient has not had any symptoms of  low sugars. The patient remains on medications for hypertension. he is tolerating the medications well. Review of Systems   HENT:        An abscessed tooth - now on Clindamycin   Respiratory: Negative for shortness of breath. Cardiovascular: Negative for chest pain and leg swelling. Allergies   Allergen Reactions    Ampicillin Hives     Became allergic 30 years ago.  Keflex [Cephalexin] Itching    Lonox [Diphenoxylate-Atropine] Shortness of Breath    Penicillin V Potassium Hives       Current Outpatient Prescriptions   Medication Sig Dispense Refill    clindamycin (CLEOCIN) 300 mg capsule Take 300 mg by mouth three (3) times daily.  captopril (CAPOTEN) 25 mg tablet Take 1 Tab by mouth two (2) times a day. 60 Tab 5    nadolol (CORGARD) 40 mg tablet Take 0.5 Tabs by mouth daily. 15 Tab 5    hydroCHLOROthiazide (HYDRODIURIL) 25 mg tablet Take 1 Tab by mouth daily. 30 Tab 5    metFORMIN (GLUCOPHAGE) 1,000 mg tablet Take 1 Tab by mouth two (2) times daily (with meals). 60 Tab 5    glimepiride (AMARYL) 2 mg tablet TAKE ONE TABLET BY MOUTH EVERY MORNING FOR DIABETES 30 Tab 5    dicyclomine (BENTYL) 10 mg capsule Take 1 Cap by mouth four (4) times daily. 120 Cap 5    tamsulosin (FLOMAX) 0.4 mg capsule Take 1 Cap by mouth daily. 30 Cap 5    warfarin (COUMADIN) 2.5 mg tablet Take 2 tablets on Thursday and Friday (one tablet on the other days) or as directed. 40 Tab 5    clotrimazole-betamethasone (LOTRISONE) topical cream Use a small amount to affected area twice daily 45 g 3    aspirin delayed-release 81 mg tablet Take  by mouth daily.  cetirizine (ZYRTEC) 10 mg tablet Take  by mouth.          Past Medical History:   Diagnosis Date  Allergic rhinitis     Benign paroxysmal positional vertigo     Dermatophytosis     Hypertrophy (benign) of prostate     Phlebitis and thrombophlebitis of other deep vessels of unspecified lower extremity (HCC)     Type II diabetes mellitus (HCC)        Past Surgical History:   Procedure Laterality Date    HX COLONOSCOPY      polyps       Social History     Social History    Marital status: UNKNOWN     Spouse name: N/A    Number of children: N/A    Years of education: N/A     Occupational History    Not on file. Social History Main Topics    Smoking status: Former Smoker    Smokeless tobacco: Never Used    Alcohol use No    Drug use: No    Sexual activity: No     Other Topics Concern    Not on file     Social History Narrative       Patient does not have an advanced directive on file    Visit Vitals    /62 (BP 1 Location: Left arm, BP Patient Position: Sitting)    Pulse (!) 57    Temp 97.4 °F (36.3 °C) (Tympanic)    Resp 16    Ht 5' 4\" (1.626 m)    Wt 203 lb (92.1 kg)    SpO2 98%    BMI 34.84 kg/m2       Physical Exam   No Cervical Lymphadenopathy  No Supraclavicular Lymphadenopathy  Thyroid is Normal  Lungs are clear to ausculation and percussion  Heart:  S1 S2 are normal, No gallops, No mummers  No Carotid Bruits  Abdomen:  Normal Bowel Sounds. No tenderness. No masses.   No Hepatomegaly or Splenomegly  DM Foot:  Diabetic foot exam:     Left: Reflexes 2+     Vibratory sensation diminished    Proprioception normal   Sharp/dull discrimination normal    Filament test 5/6   Pulse DP: 2+ (normal)   Pulse PT: 2+ (normal)   Deformities: None  Right: Reflexes 2+   Vibratory sensation normal   Proprioception normal   Sharp/dull discrimination normal   Filament test 5/6   Pulse DP: 2+ (normal)   Pulse PT: 2+ (normal)   Deformities: None      BMI:  The patient was advised to limit calories to 1700   and carbohydrates to 100 grams daily    Hospital Outpatient Visit on 10/27/2017 Component Date Value Ref Range Status    WBC 10/27/2017 8.3  4.6 - 13.2 K/uL Final    RBC 10/27/2017 4.10* 4.70 - 5.50 M/uL Final    HGB 10/27/2017 13.0  13.0 - 16.0 g/dL Final    HCT 10/27/2017 39.2  36.0 - 48.0 % Final    MCV 10/27/2017 95.6  74.0 - 97.0 FL Final    MCH 10/27/2017 31.7  24.0 - 34.0 PG Final    MCHC 10/27/2017 33.2  31.0 - 37.0 g/dL Final    RDW 10/27/2017 13.8  11.6 - 14.5 % Final    PLATELET 12/09/3494 449* 135 - 420 K/uL Final    MPV 10/27/2017 11.6  9.2 - 11.8 FL Final    NEUTROPHILS 10/27/2017 63  40 - 73 % Final    LYMPHOCYTES 10/27/2017 24  21 - 52 % Final    MONOCYTES 10/27/2017 6  3 - 10 % Final    EOSINOPHILS 10/27/2017 6* 0 - 5 % Final    BASOPHILS 10/27/2017 1  0 - 2 % Final    ABS. NEUTROPHILS 10/27/2017 5.3  1.8 - 8.0 K/UL Final    ABS. LYMPHOCYTES 10/27/2017 2.0  0.9 - 3.6 K/UL Final    ABS. MONOCYTES 10/27/2017 0.5  0.05 - 1.2 K/UL Final    ABS. EOSINOPHILS 10/27/2017 0.5* 0.0 - 0.4 K/UL Final    ABS. BASOPHILS 10/27/2017 0.1* 0.0 - 0.06 K/UL Final    DF 10/27/2017 AUTOMATED    Final    Sodium 10/27/2017 139  136 - 145 mmol/L Final    Potassium 10/27/2017 4.5  3.5 - 5.5 mmol/L Final    Chloride 10/27/2017 103  100 - 108 mmol/L Final    CO2 10/27/2017 26  21 - 32 mmol/L Final    Anion gap 10/27/2017 10  3.0 - 18 mmol/L Final    Glucose 10/27/2017 136* 74 - 99 mg/dL Final    BUN 10/27/2017 24* 7.0 - 18 MG/DL Final    Creatinine 10/27/2017 1.13  0.6 - 1.3 MG/DL Final    BUN/Creatinine ratio 10/27/2017 21* 12 - 20   Final    GFR est AA 10/27/2017 >60  >60 ml/min/1.73m2 Final    GFR est non-AA 10/27/2017 >60  >60 ml/min/1.73m2 Final    Comment: (NOTE)  Estimated GFR is calculated using the Modification of Diet in Renal   Disease (MDRD) Study equation, reported for both  Americans   (GFRAA) and non- Americans (GFRNA), and normalized to 1.73m2   body surface area. The physician must decide which value applies to   the patient.  The MDRD study equation should only be used in   individuals age 25 or older. It has not been validated for the   following: pregnant women, patients with serious comorbid conditions,   or on certain medications, or persons with extremes of body size,   muscle mass, or nutritional status.  Calcium 10/27/2017 8.6  8.5 - 10.1 MG/DL Final    Bilirubin, total 10/27/2017 0.4  0.2 - 1.0 MG/DL Final    ALT (SGPT) 10/27/2017 23  16 - 61 U/L Final    AST (SGOT) 10/27/2017 15  15 - 37 U/L Final    Alk. phosphatase 10/27/2017 59  45 - 117 U/L Final    Protein, total 10/27/2017 7.3  6.4 - 8.2 g/dL Final    Albumin 10/27/2017 3.9  3.4 - 5.0 g/dL Final    Globulin 10/27/2017 3.4  2.0 - 4.0 g/dL Final    A-G Ratio 10/27/2017 1.1  0.8 - 1.7   Final    Hemoglobin A1c 10/27/2017 6.5* 4.2 - 5.6 % Final    Comment: (NOTE)  HbA1C Interpretive Ranges  <5.7              Normal  5.7 - 6.4         Consider Prediabetes  >6.5              Consider Diabetes      Est. average glucose 10/27/2017 140  mg/dL Final    Comment: (NOTE)  The eAG should be interpreted with patient characteristics in mind   since ethnicity, interindividual differences, red cell lifespan,   variation in rates of glycation, etc. may affect the validity of the   calculation.  Microalbumin,urine random 10/27/2017 11.90* 0 - 3.0 MG/DL Final    Creatinine, urine 10/27/2017 81.29  30 - 125 mg/dL Final    Microalbumin/Creat ratio (mg/g cre* 10/27/2017 146* 0 - 30 mg/g Final    LIPID PROFILE 10/27/2017        Final    Cholesterol, total 10/27/2017 208* <200 MG/DL Final    Triglyceride 10/27/2017 217* <150 MG/DL Final    Comment: The drugs N-acetylcysteine (NAC) and  Metamiszole have been found to cause falsely  low results in this chemical assay. Please  be sure to submit blood samples obtained  BEFORE administration of either of these  drugs to assure correct results.       HDL Cholesterol 10/27/2017 43  40 - 60 MG/DL Final    LDL, calculated 10/27/2017 121.6* 0 - 100 MG/DL Final    VLDL, calculated 10/27/2017 43.4  MG/DL Final    CHOL/HDL Ratio 10/27/2017 4.8  0 - 5.0   Final    Prothrombin time 10/27/2017 20.5* 11.5 - 15.2 sec Final    INR 10/27/2017 1.8* 0.8 - 1.2   Final    Comment:            INR Therapeutic Ranges         (on stable oral anticoagulant):     INDICATION                INR  DVT/PE/Atrial Fib          2.0-3.0  MI/Mechanical Heart Valve  2.5-3.5     Anti-Coag visit on 10/12/2017   Component Date Value Ref Range Status    VALID INTERNAL CONTROL POC 10/12/2017 Yes   Final    INR POC 10/12/2017 2.4   Final   Anti-Coag visit on 09/22/2017   Component Date Value Ref Range Status    VALID INTERNAL CONTROL POC 09/22/2017 Yes   Final    INR POC 09/22/2017 3.4   Final   Anti-Coag visit on 09/08/2017   Component Date Value Ref Range Status    VALID INTERNAL CONTROL POC 09/08/2017 Yes   Final    INR POC 09/08/2017 1.6   Final   Anti-Coag visit on 09/01/2017   Component Date Value Ref Range Status    VALID INTERNAL CONTROL POC 09/01/2017 Yes   Final    INR POC 09/01/2017 4.4   Final   Hospital Outpatient Visit on 08/07/2017   Component Date Value Ref Range Status    Special Requests: 08/07/2017 NO SPECIAL REQUESTS    Final    Culture result: 08/07/2017 >100,000 COLONIES/mL ESCHERICHIA COLI*   Final   Office Visit on 08/07/2017   Component Date Value Ref Range Status    VALID INTERNAL CONTROL POC 08/07/2017 Yes   Final    INR POC 08/07/2017 2.8   Final    Color (UA POC) 08/07/2017 Yellow   Final    Clarity (UA POC) 08/07/2017 Cloudy   Final    Glucose (UA POC) 08/07/2017 Negative  Negative Final    Bilirubin (UA POC) 08/07/2017 1+  Negative Final    Ketones (UA POC) 08/07/2017 Trace  Negative Final    Specific gravity (UA POC) 08/07/2017 1.030  1.001 - 1.035 Final    Blood (UA POC) 08/07/2017 2+  Negative Final    pH (UA POC) 08/07/2017 5.0  4.6 - 8.0 Final    Protein (UA POC) 08/07/2017 2+  Negative mg/dL Final    Urobilinogen (UA POC) 08/07/2017 1 mg/dL  0.2 - 1 Final    Nitrites (UA POC) 08/07/2017 Positive  Negative Final    Leukocyte esterase (UA POC) 08/07/2017 Trace  Negative Final       .  Results for orders placed or performed during the hospital encounter of 10/27/17   CBC WITH AUTOMATED DIFF   Result Value Ref Range    WBC 8.3 4.6 - 13.2 K/uL    RBC 4.10 (L) 4.70 - 5.50 M/uL    HGB 13.0 13.0 - 16.0 g/dL    HCT 39.2 36.0 - 48.0 %    MCV 95.6 74.0 - 97.0 FL    MCH 31.7 24.0 - 34.0 PG    MCHC 33.2 31.0 - 37.0 g/dL    RDW 13.8 11.6 - 14.5 %    PLATELET 654 (L) 340 - 420 K/uL    MPV 11.6 9.2 - 11.8 FL    NEUTROPHILS 63 40 - 73 %    LYMPHOCYTES 24 21 - 52 %    MONOCYTES 6 3 - 10 %    EOSINOPHILS 6 (H) 0 - 5 %    BASOPHILS 1 0 - 2 %    ABS. NEUTROPHILS 5.3 1.8 - 8.0 K/UL    ABS. LYMPHOCYTES 2.0 0.9 - 3.6 K/UL    ABS. MONOCYTES 0.5 0.05 - 1.2 K/UL    ABS. EOSINOPHILS 0.5 (H) 0.0 - 0.4 K/UL    ABS. BASOPHILS 0.1 (H) 0.0 - 0.06 K/UL    DF AUTOMATED     METABOLIC PANEL, COMPREHENSIVE   Result Value Ref Range    Sodium 139 136 - 145 mmol/L    Potassium 4.5 3.5 - 5.5 mmol/L    Chloride 103 100 - 108 mmol/L    CO2 26 21 - 32 mmol/L    Anion gap 10 3.0 - 18 mmol/L    Glucose 136 (H) 74 - 99 mg/dL    BUN 24 (H) 7.0 - 18 MG/DL    Creatinine 1.13 0.6 - 1.3 MG/DL    BUN/Creatinine ratio 21 (H) 12 - 20      GFR est AA >60 >60 ml/min/1.73m2    GFR est non-AA >60 >60 ml/min/1.73m2    Calcium 8.6 8.5 - 10.1 MG/DL    Bilirubin, total 0.4 0.2 - 1.0 MG/DL    ALT (SGPT) 23 16 - 61 U/L    AST (SGOT) 15 15 - 37 U/L    Alk.  phosphatase 59 45 - 117 U/L    Protein, total 7.3 6.4 - 8.2 g/dL    Albumin 3.9 3.4 - 5.0 g/dL    Globulin 3.4 2.0 - 4.0 g/dL    A-G Ratio 1.1 0.8 - 1.7     HEMOGLOBIN A1C WITH EAG   Result Value Ref Range    Hemoglobin A1c 6.5 (H) 4.2 - 5.6 %    Est. average glucose 140 mg/dL   MICROALBUMIN, UR, RAND W/ MICROALBUMIN/CREA RATIO   Result Value Ref Range    Microalbumin,urine random 11.90 (H) 0 - 3.0 MG/DL    Creatinine, urine 81.29 30 - 125 mg/dL Microalbumin/Creat ratio (mg/g creat) 146 (H) 0 - 30 mg/g   LIPID PANEL   Result Value Ref Range    LIPID PROFILE          Cholesterol, total 208 (H) <200 MG/DL    Triglyceride 217 (H) <150 MG/DL    HDL Cholesterol 43 40 - 60 MG/DL    LDL, calculated 121.6 (H) 0 - 100 MG/DL    VLDL, calculated 43.4 MG/DL    CHOL/HDL Ratio 4.8 0 - 5.0     PROTHROMBIN TIME + INR   Result Value Ref Range    Prothrombin time 20.5 (H) 11.5 - 15.2 sec    INR 1.8 (H) 0.8 - 1.2         Assessment / Plan      ICD-10-CM ICD-9-CM    1. Controlled type 2 diabetes mellitus without complication, without long-term current use of insulin ([facility]) E11.9 250.00 clindamycin (CLEOCIN) 300 mg capsule      CBC WITH AUTOMATED DIFF      METABOLIC PANEL, COMPREHENSIVE      HEMOGLOBIN A1C WITH EAG      MICROALBUMIN, UR, RAND W/ MICROALBUMIN/CREA RATIO      PROTHROMBIN TIME + INR      AZ COLLECTION VENOUS BLOOD,VENIPUNCTURE       DIABETES FOOT EXAM   2. Phlebitis and thrombophlebitis of other deep vessels of unspecified lower extremity ([facility]) I80.299 451.19 clindamycin (CLEOCIN) 300 mg capsule      CBC WITH AUTOMATED DIFF      METABOLIC PANEL, COMPREHENSIVE      HEMOGLOBIN A1C WITH EAG      MICROALBUMIN, UR, RAND W/ MICROALBUMIN/CREA RATIO      PROTHROMBIN TIME + INR      AZ COLLECTION VENOUS BLOOD,VENIPUNCTURE   3. Mixed hyperlipidemia E78.2 272.2 LIPID PANEL      AZ COLLECTION VENOUS BLOOD,VENIPUNCTURE       he was advised to continue his maintenance medications  Labs      Follow-up Disposition:  Return in about 7 months (around 5/27/2018). I asked Juan R Newman. Verna Watson if he has any questions and I answered the questions. Nahun Cha. states that he understands the treatment plan and agrees with the treatment plan

## 2017-11-07 ENCOUNTER — ANTI-COAG VISIT (OUTPATIENT)
Dept: INTERNAL MEDICINE CLINIC | Age: 72
End: 2017-11-07

## 2017-11-07 DIAGNOSIS — I80.299 PHLEBITIS AND THROMBOPHLEBITIS OF OTHER DEEP VESSELS OF UNSPECIFIED LOWER EXTREMITY (HCC): ICD-10-CM

## 2017-11-09 LAB
INR BLD: 1.1
PT POC: NORMAL SECONDS
VALID INTERNAL CONTROL?: YES

## 2017-11-09 NOTE — PROGRESS NOTES
Mr. Colette Estrada is here today for anticoagulation monitoring for the diagnosis of Atrial Fibrillation. His INR goal is 2.0-3.0 and his current Coumadin dose is 2.5 mg daily with last 4 days held. Today's findings include an INR of 1.1 (normal INR range 0.8-1.2). Considering Mr. Escobedo's past history, todays findings, and per the coumadin policy/protocol, Mr. Rome Sloan was instructed to take Coumadin as follows,  Resume 2.5 mg daily. He was also instructed to schedule an appointment in 1 weeks prior to leaving for an INR check. A full discussion of the nature of anticoagulants has been carried out. A full discussion of the need for frequent and regular monitoring, precise dosage adjustment and compliance was stressed. Side effects of potential bleeding were discussed and Mr. Rome Sloan was instructed to call [phone] if there are any signs of abnormal bleeding. Mr. Rome Sloan was instructed to avoid any OTC items containing aspirin or ibuprofen and prior to starting any new OTC products to consult with his physician or pharmacist to ensure no drug interactions are present. Mr. Rome Sloan was instructed to avoid any major changes in his general diet and to avoid alcohol consumption. .    Mr. Rome Sloan was provided a literature booklet, \"Treatment with Warfarin (Coumadin)\", that includes topics on understanding coumadin therapy, drug interaction considerations, vitamin K and coumadin use, interactions with foods and supplements containing vitamin K, and the use of herbal products. Mr. Rome Sloan verbalized his understanding of all instructions and will call the office with any questions, concerns, or signs of abnormal bleeding or blood clot.

## 2017-11-28 ENCOUNTER — ANTI-COAG VISIT (OUTPATIENT)
Dept: INTERNAL MEDICINE CLINIC | Age: 72
End: 2017-11-28

## 2017-11-28 DIAGNOSIS — Z12.5 PROSTATE CANCER SCREENING: ICD-10-CM

## 2017-11-28 DIAGNOSIS — I80.299 PHLEBITIS AND THROMBOPHLEBITIS OF OTHER DEEP VESSELS OF UNSPECIFIED LOWER EXTREMITY (HCC): ICD-10-CM

## 2017-11-28 DIAGNOSIS — I10 ESSENTIAL HYPERTENSION WITH GOAL BLOOD PRESSURE LESS THAN 140/90: ICD-10-CM

## 2017-11-28 LAB
INR BLD: 2
PT POC: NORMAL SECONDS
VALID INTERNAL CONTROL?: YES

## 2017-11-28 RX ORDER — WARFARIN 2.5 MG/1
TABLET ORAL
Qty: 40 TAB | Refills: 5 | Status: SHIPPED | OUTPATIENT
Start: 2017-11-28 | End: 2018-04-20 | Stop reason: SDUPTHER

## 2017-11-28 RX ORDER — NADOLOL 40 MG/1
20 TABLET ORAL DAILY
Qty: 15 TAB | Refills: 5 | Status: SHIPPED | OUTPATIENT
Start: 2017-11-28 | End: 2018-05-29 | Stop reason: SDUPTHER

## 2017-11-28 RX ORDER — CAPTOPRIL 25 MG/1
25 TABLET ORAL 2 TIMES DAILY
Qty: 60 TAB | Refills: 5 | Status: SHIPPED | OUTPATIENT
Start: 2017-11-28 | End: 2018-05-29 | Stop reason: SDUPTHER

## 2017-11-28 RX ORDER — GLIMEPIRIDE 2 MG/1
TABLET ORAL
Qty: 30 TAB | Refills: 5 | Status: SHIPPED | OUTPATIENT
Start: 2017-11-28 | End: 2018-05-29 | Stop reason: SDUPTHER

## 2017-11-28 RX ORDER — DICYCLOMINE HYDROCHLORIDE 10 MG/1
10 CAPSULE ORAL 4 TIMES DAILY
Qty: 120 CAP | Refills: 5 | Status: SHIPPED | OUTPATIENT
Start: 2017-11-28 | End: 2018-05-29 | Stop reason: SDUPTHER

## 2017-11-28 RX ORDER — HYDROCHLOROTHIAZIDE 25 MG/1
25 TABLET ORAL DAILY
Qty: 30 TAB | Refills: 5 | Status: SHIPPED | OUTPATIENT
Start: 2017-11-28 | End: 2018-05-29 | Stop reason: SDUPTHER

## 2017-11-28 RX ORDER — TAMSULOSIN HYDROCHLORIDE 0.4 MG/1
0.4 CAPSULE ORAL DAILY
Qty: 30 CAP | Refills: 5 | Status: SHIPPED | OUTPATIENT
Start: 2017-11-28 | End: 2018-05-29 | Stop reason: SDUPTHER

## 2017-11-28 RX ORDER — METFORMIN HYDROCHLORIDE 1000 MG/1
1000 TABLET ORAL 2 TIMES DAILY WITH MEALS
Qty: 60 TAB | Refills: 5 | Status: SHIPPED | OUTPATIENT
Start: 2017-11-28 | End: 2018-05-29 | Stop reason: SDUPTHER

## 2017-11-28 NOTE — PROGRESS NOTES
Mr. Dolores Sweet is here today for anticoagulation monitoring for the diagnosis of DVT. His INR goal is 2.0-3.0 and his current Coumadin dose is 17.5 mg. Today's findings include an INR of 2.0 (normal INR range 0.8-1.2). Considering Mr. Escobedo's past history, todays findings, and per the coumadin policy/protocol, Mr. Nadeem Easton was instructed to take Coumadin as follows,  Continue same dose. He was also instructed to schedule an appointment in 3 weeks prior to leaving for an INR check. A full discussion of the nature of anticoagulants has been carried out. A full discussion of the need for frequent and regular monitoring, precise dosage adjustment and compliance was stressed. Side effects of potential bleeding were discussed and Mr. Nadeem Easton was instructed to call [phone] if there are any signs of abnormal bleeding. Mr. Nadeem Easton was instructed to avoid any OTC items containing aspirin or ibuprofen and prior to starting any new OTC products to consult with his physician or pharmacist to ensure no drug interactions are present. Mr. Nadeem Easton was instructed to avoid any major changes in his general diet and to avoid alcohol consumption. .    Mr. Nadeem Easton was provided a literature booklet, \"Treatment with Warfarin (Coumadin)\", that includes topics on understanding coumadin therapy, drug interaction considerations, vitamin K and coumadin use, interactions with foods and supplements containing vitamin K, and the use of herbal products. Mr. Nadeem Easton verbalized his understanding of all instructions and will call the office with any questions, concerns, or signs of abnormal bleeding or blood clot.

## 2018-01-12 ENCOUNTER — ANTI-COAG VISIT (OUTPATIENT)
Dept: INTERNAL MEDICINE CLINIC | Age: 73
End: 2018-01-12

## 2018-01-12 DIAGNOSIS — I80.299 PHLEBITIS AND THROMBOPHLEBITIS OF OTHER DEEP VESSELS OF UNSPECIFIED LOWER EXTREMITY (HCC): ICD-10-CM

## 2018-01-12 LAB
INR BLD: 2
PT POC: NORMAL SECONDS
VALID INTERNAL CONTROL?: YES

## 2018-02-26 ENCOUNTER — ANTI-COAG VISIT (OUTPATIENT)
Dept: INTERNAL MEDICINE CLINIC | Age: 73
End: 2018-02-26

## 2018-02-26 DIAGNOSIS — I80.299 PHLEBITIS AND THROMBOPHLEBITIS OF OTHER DEEP VESSELS OF UNSPECIFIED LOWER EXTREMITY (HCC): ICD-10-CM

## 2018-02-26 LAB
INR BLD: 1.6
PT POC: NORMAL SECONDS
VALID INTERNAL CONTROL?: YES

## 2018-03-01 NOTE — PATIENT INSTRUCTIONS
Taking Warfarin Safely: Care Instructions  Your Care Instructions    Warfarin is a medicine that you take to prevent blood clots. It is often called a blood thinner. Doctors give warfarin (such as Coumadin) to reduce the risk of blood clots. You may be at risk for blood clots if you have atrial fibrillation or deep vein thrombosis. Some other health problems may also put you at risk. Warfarin slows the amount of time it takes for your blood to clot. It can cause bleeding problems. Even if you've been taking warfarin for a while, it's important to know how to take it safely. Foods and other medicines can affect the way warfarin works. Some can make warfarin work too well. This can cause bleeding problems. And some can make it work poorly, so that it does not prevent blood clots very well. You will need regular blood tests to check how long it takes for your blood to form a clot. This test is called a PT or prothrombin time test. The result of the test is called an INR level. Depending on the test results, your doctor or anticoagulation clinic may adjust your dose of warfarin. Follow-up care is a key part of your treatment and safety. Be sure to make and go to all appointments, and call your doctor if you are having problems. It's also a good idea to know your test results and keep a list of the medicines you take. How can you care for yourself at home? Take warfarin safely  · Take your warfarin at the same time each day. · If you miss a dose of warfarin, don't take an extra dose to make up for it. Your doctor can tell you exactly what to do so you don't take too much or too little. · Wear medical alert jewelry that lets others know that you take warfarin. You can buy this at most drugstores. · Don't take warfarin if you are pregnant or planning to get pregnant. Talk to your doctor about how you can prevent getting pregnant while you are taking it.   · Don't change your dose or stop taking warfarin unless your doctor tells you to. Effects of medicines and food on warfarin  · Don't start or stop taking any medicines, vitamins, or natural remedies unless you first talk to your doctor. Many medicines can affect how warfarin works. These include aspirin and other pain relievers, over-the-counter medicines, multivitamins, dietary supplements, and herbal products. · Tell all of your doctors and pharmacists that you take warfarin. Some prescription medicines can affect how warfarin works. · Keep the amount of vitamin K in your diet about the same from day to day. Do not suddenly eat a lot more or a lot less food that is rich in vitamin K than you usually do. Vitamin K affects how warfarin works and how your blood clots. Talk with your doctor before making big changes in your diet. Foods that have a lot of vitamin K include cooked green vegetables, such as:  ¨ Kale, spinach, turnip greens, gaby greens, Swiss chard, and mustard greens. ¨ [place] sprouts, broccoli, and asparagus. · Limit your use of alcohol. Avoid bleeding by preventing falls and injuries  · Wear slippers or shoes with nonskid soles. · Remove throw rugs and clutter. · Rearrange furniture and electrical cords to keep them out of walking paths. · Keep stairways, porches, and outside walkways well lit. Use night-lights in hallways and bathrooms. · Be extra careful when you work with sharp tools or knives. When should you call for help? Call 911 anytime you think you may need emergency care. For example, call if:  ? · You have a sudden, severe headache that is different from past headaches. ?Call your doctor now or seek immediate medical care if:  ? · You have any abnormal bleeding, such as:  ¨ Nosebleeds. ¨ Vaginal bleeding that is different (heavier, more frequent, at a different time of the month) than what you are used to. ¨ Bloody or black stools, or rectal bleeding. ¨ Bloody or pink urine. ? Watch closely for changes in your health, and be sure to contact your doctor if you have any problems. Where can you learn more? Go to http://harsh-kirsten.info/. Enter J014 in the search box to learn more about \"Taking Warfarin Safely: Care Instructions. \"  Current as of: September 21, 2016  Content Version: 11.4  © 2973-1573 [employer]. Care instructions adapted under license by [employer] (which disclaims liability or warranty for this information). If you have questions about a medical condition or this instruction, always ask your healthcare professional. [address] any warranty or liability for your use of this information.

## 2018-03-01 NOTE — PROGRESS NOTES
Mr. Prince Hinojosa is here today for anticoagulation monitoring for the diagnosis of thrombophlebitis. His INR goal is 2.0-3.0 and his current Coumadin dose is 2.5 mg daily. Today's findings include an INR of 1.6 (normal INR range 0.8-1.2). Considering Mr. Escobedo's past history, todays findings, and per the coumadin policy/protocol, Mr. Mc Edmonds was instructed to take Coumadin as follows,  2.5 mg daily. He was also instructed to schedule an appointment in 2 weeks prior to leaving for an INR check. A full discussion of the nature of anticoagulants has been carried out. A full discussion of the need for frequent and regular monitoring, precise dosage adjustment and compliance was stressed. Side effects of potential bleeding were discussed and Mr. Mc Edmonds was instructed to call [phone] if there are any signs of abnormal bleeding. Mr. Mc Edmonds was instructed to avoid any OTC items containing aspirin or ibuprofen and prior to starting any new OTC products to consult with his physician or pharmacist to ensure no drug interactions are present. Mr. Mc Edmonds was instructed to avoid any major changes in his general diet and to avoid alcohol consumption. Mr. Mc Edmonds verbalized his understanding of all instructions and will call the office with any questions, concerns, or signs of abnormal bleeding or blood clot.

## 2018-03-08 ENCOUNTER — ANTI-COAG VISIT (OUTPATIENT)
Dept: INTERNAL MEDICINE CLINIC | Age: 73
End: 2018-03-08

## 2018-03-08 DIAGNOSIS — I80.299 PHLEBITIS AND THROMBOPHLEBITIS OF OTHER DEEP VESSELS OF UNSPECIFIED LOWER EXTREMITY (HCC): ICD-10-CM

## 2018-03-09 LAB
INR BLD: 2.2
PT POC: NORMAL SECONDS
VALID INTERNAL CONTROL?: YES

## 2018-03-09 NOTE — PROGRESS NOTES
Mitzi Rodrigues Mr. Lesly Rivera is here today for anticoagulation monitoring for the diagnosis of DVT. His INR goal is 2.0-3.0 and his current Coumadin dose is 2.5 mg daily except 5 mg on Thursday and Friday . Today's findings include an INR of 2.2 (normal INR range 0.8-1.2)     Considering Mr. Escobedo's past history, todays findings, and per the coumadin policy/protocol, Mr. Warren Dasilva was instructed to take Coumadin as follows,  Continue same dose . He was also instructed to schedule an appointment in 3 weeks prior to leaving for an INR check. A full discussion of the nature of anticoagulants has been carried out. A full discussion of the need for frequent and regular monitoring, precise dosage adjustment and compliance was stressed. Side effects of potential bleeding were discussed and Mr. Warren Dasilva was instructed to call [phone] if there are any signs of abnormal bleeding. Mr. Warren Dasilva was instructed to avoid any OTC items containing aspirin or ibuprofen and prior to starting any new OTC products to consult with his physician or pharmacist to ensure no drug interactions are present. Mr. Warren Dasilva was instructed to avoid any major changes in his general diet and to avoid alcohol consumption. .    Mr. Escobedo verbalized his understanding of all instructions and will call the office with any questions, concerns, or signs of abnormal bleeding or blood clot.

## 2018-04-20 ENCOUNTER — OFFICE VISIT (OUTPATIENT)
Dept: INTERNAL MEDICINE CLINIC | Age: 73
End: 2018-04-20

## 2018-04-20 VITALS
HEIGHT: 64 IN | OXYGEN SATURATION: 98 % | BODY MASS INDEX: 36.88 KG/M2 | SYSTOLIC BLOOD PRESSURE: 112 MMHG | WEIGHT: 216 LBS | TEMPERATURE: 96.9 F | DIASTOLIC BLOOD PRESSURE: 60 MMHG | HEART RATE: 58 BPM

## 2018-04-20 DIAGNOSIS — Z00.00 MEDICARE ANNUAL WELLNESS VISIT, SUBSEQUENT: Primary | ICD-10-CM

## 2018-04-20 DIAGNOSIS — Z12.5 SCREENING FOR PROSTATE CANCER: ICD-10-CM

## 2018-04-20 DIAGNOSIS — Z12.11 COLON CANCER SCREENING: ICD-10-CM

## 2018-04-20 DIAGNOSIS — Z13.31 DEPRESSION SCREENING: ICD-10-CM

## 2018-04-20 DIAGNOSIS — E78.2 MIXED HYPERLIPIDEMIA: ICD-10-CM

## 2018-04-20 DIAGNOSIS — E11.9 CONTROLLED TYPE 2 DIABETES MELLITUS WITHOUT COMPLICATION, WITHOUT LONG-TERM CURRENT USE OF INSULIN (HCC): ICD-10-CM

## 2018-04-20 DIAGNOSIS — I80.299 PHLEBITIS AND THROMBOPHLEBITIS OF OTHER DEEP VESSELS OF UNSPECIFIED LOWER EXTREMITY (HCC): ICD-10-CM

## 2018-04-20 LAB
INR BLD: 2.5
PT POC: 30.2 SECONDS
VALID INTERNAL CONTROL?: YES

## 2018-04-20 RX ORDER — WARFARIN 2.5 MG/1
TABLET ORAL
Qty: 30 TAB | Refills: 2
Start: 2018-04-20 | End: 2018-05-29 | Stop reason: SDUPTHER

## 2018-04-20 NOTE — PROGRESS NOTES
This is the Subsequent Medicare Annual Wellness Exam, performed 12 months or more after the Initial AWV or the last Subsequent AWV    I have reviewed the patient's medical history in detail and updated the computerized patient record. 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 His protimes have been stable. The patient remains on oral agents for type II diabetes mellitus. His sugars are doing well. The patient is due for prostate cancer screening and colon cancer screening        Past Medical History:   Diagnosis Date    Allergic rhinitis     Benign paroxysmal positional vertigo     Dermatophytosis     Hypertrophy (benign) of prostate     Phlebitis and thrombophlebitis of other deep vessels of unspecified lower extremity ([facility] [address]     Type II diabetes mellitus (HCC)       Past Surgical History:   Procedure Laterality Date    HX COLONOSCOPY      polyps     Current Outpatient Prescriptions   Medication Sig Dispense Refill    warfarin (COUMADIN) 2.5 mg tablet 1 tab daily 30 Tab 2    captopril (CAPOTEN) 25 mg tablet Take 1 Tab by mouth two (2) times a day. 60 Tab 5    nadolol (CORGARD) 40 mg tablet Take 0.5 Tabs by mouth daily. 15 Tab 5    hydroCHLOROthiazide (HYDRODIURIL) 25 mg tablet Take 1 Tab by mouth daily. 30 Tab 5    metFORMIN (GLUCOPHAGE) 1,000 mg tablet Take 1 Tab by mouth two (2) times daily (with meals). 60 Tab 5    glimepiride (AMARYL) 2 mg tablet TAKE ONE TABLET BY MOUTH EVERY MORNING FOR DIABETES 30 Tab 5    dicyclomine (BENTYL) 10 mg capsule Take 1 Cap by mouth four (4) times daily. 120 Cap 5    tamsulosin (FLOMAX) 0.4 mg capsule Take 1 Cap by mouth daily. 30 Cap 5    cetirizine (ZYRTEC) 10 mg tablet Take  by mouth.  clotrimazole-betamethasone (LOTRISONE) topical cream Use a small amount to affected area twice daily 45 g 3    aspirin delayed-release 81 mg tablet Take  by mouth daily.        Allergies   Allergen Reactions    Ampicillin Hives Became allergic 30 years ago.  Keflex [Cephalexin] Itching    Lonox [Diphenoxylate-Atropine] Shortness of Breath    Penicillin V Potassium Hives     Family History   Problem Relation Age of Onset    Cancer Mother     Heart Disease Father      Social History   Substance Use Topics    Smoking status: Former Smoker    Smokeless tobacco: Never Used    Alcohol use No     Patient Active Problem List   Diagnosis Code    Dermatophytosis B35.9    Allergic rhinitis J30.9    Phlebitis and thrombophlebitis of other deep vessels of unspecified lower extremity ([facility]) I80.299    Hypertrophy (benign) of prostate N40.0    Benign paroxysmal positional vertigo H81.10    Diabetes mellitus type 2, controlled ([facility]) E11.9    Mixed hyperlipidemia E78.2       Depression Risk Factor Screening:     PHQ over the last two weeks 8/7/2017   Little interest or pleasure in doing things Not at all   Feeling down, depressed or hopeless Not at all   Total Score PHQ 2 0     Alcohol Risk Factor Screening: You do not drink alcohol or very rarely. Functional Ability and Level of Safety:   Hearing Loss  Hearing is good. Activities of Daily Living  The home contains: no safety equipment. Patient does total self care    Fall Risk  Fall Risk Assessment, last 12 mths 8/7/2017   Able to walk? Yes   Fall in past 12 months? No       Abuse Screen  Patient is not abused    Cognitive Screening   Evaluation of Cognitive Function:  Has your family/caregiver stated any concerns about your memory: no  Normal     No Cervical Lymphadenopathy  No Supraclavicular Lymphadenopathy  Thyroid is Normal  Lungs are normal to percussion. Clear to auscultation   Heart:  S1 S2 are normal, No gallops, No mummers  No Carotid Bruits  Abdomen:  Normal Bowel Sounds. No tenderness. No masses.   No Hepatomegaly or Splenomegly  Diabetic foot exam:     Left: Reflexes 2+     Vibratory sensation normal    Proprioception normal   Sharp/dull discrimination normal Filament test normal sensation with micro filament   Pulse DP: 2+ (normal)   Pulse PT: 2+ (normal)   Deformities: None  Right: Reflexes 2+   Vibratory sensation normal   Proprioception normal   Sharp/dull discrimination normal   Filament test normal sensation with micro filament   Pulse DP: 2+ (normal)   Pulse PT: 2+ (normal)   Deformities: None      Patient Care Team   Patient Care Team:  Karol Pina MD as PCP - General (Internal Medicine)    Assessment/Plan   Education and counseling provided:  Are appropriate based on today's review and evaluation    Diagnoses and all orders for this visit:    1. Medicare annual wellness visit, subsequent    2. Controlled type 2 diabetes mellitus without complication, without long-term current use of insulin (HCC)  -     CBC WITH AUTOMATED DIFF; Future  -     METABOLIC PANEL, COMPREHENSIVE; Future  -     HEMOGLOBIN A1C WITH EAG; Future  -     MICROALBUMIN, UR, RAND W/ MICROALB/CREAT RATIO; Future  -      DIABETES FOOT EXAM    3. Colon cancer screening  -     OCCULT BLOOD, IMMUNOASSAY (FIT); Future    4. Mixed hyperlipidemia  -     LIPID PANEL; Future    5. Screening for prostate cancer  -     PSA SCREENING (SCREENING); Future    6. Phlebitis and thrombophlebitis of other deep vessels of unspecified lower extremity (HCC)  -     AMB POC PT/INR    7. Depression screening    Other orders  -     warfarin (COUMADIN) 2.5 mg tablet; 1 tab daily        Health Maintenance Due   Topic Date Due    DTaP/Tdap/Td series (1 - Tdap) 02/05/1968    FOBT Q 1 YEAR AGE 50-75  02/05/1997    ZOSTER VACCINE AGE 60>  12/05/2006    Pneumococcal 65+ Low/Medium Risk (1 of 2 - PCV13) 02/05/2012    Influenza Age 9 to Adult  08/01/2017    HEMOGLOBIN A1C Q6M  04/27/2018       he was advised to continue his maintenance medications  PSA this year  Colonoscopy this year      Follow-up Disposition:  Return in about 6 months (around 10/20/2018). I asked Lisa Camacho.  Jhoana Mike. if he has any questions and I answered the questions. Vero Adams. states that he understands the treatment plan and agrees with the treatment plan

## 2018-04-20 NOTE — PROGRESS NOTES
Chief Complaint   Patient presents with    Well Male     6 month     . Is someone accompanying this pt? no    Is the patient using any DME equipment during OV? no    Depression Screening:  PHQ over the last two weeks 8/7/2017 4/29/2016   Little interest or pleasure in doing things Not at all Not at all   Feeling down, depressed or hopeless Not at all Not at all   Total Score PHQ 2 0 0       Learning Assessment:  Learning Assessment 3/31/2017   PRIMARY LEARNER Patient   HIGHEST LEVEL OF EDUCATION - PRIMARY LEARNER  [id number] [place] PRIMARY LEARNER NONE   CO-LEARNER CAREGIVER No   PRIMARY LANGUAGE ENGLISH    NEED No   LEARNER PREFERENCE PRIMARY LISTENING     VIDEOS   ANSWERED BY patient   RELATIONSHIP SELF       Abuse Screening:  Abuse Screening Questionnaire 4/29/2016   Do you ever feel afraid of your partner? N   Are you in a relationship with someone who physically or mentally threatens you? N   Is it safe for you to go home? Y       Fall Risk  Fall Risk Assessment, last 12 mths 8/7/2017 4/29/2016   Able to walk? Yes Yes   Fall in past 12 months? No No         Duane Gutierres. is not updated on all HM patient declines all vaccines    Pt currently taking Antiplatelet therapy? yes    Advance Directive:  1. Do you have an advance directive in place? Patient Reply:no    2. If not, would you like material regarding how to put one in place? Patient Reply: patient declines    Coordination of Care:  1. Have you been to the ER, urgent care clinic since your last visit? Hospitalized since your last visit? no    2. Have you seen or consulted any other health care providers outside of the [address] [place] [address] since your last visit? Include any pap smears or colon screening.  no

## 2018-05-29 ENCOUNTER — ANTI-COAG VISIT (OUTPATIENT)
Dept: INTERNAL MEDICINE CLINIC | Age: 73
End: 2018-05-29

## 2018-05-29 DIAGNOSIS — Z12.5 PROSTATE CANCER SCREENING: ICD-10-CM

## 2018-05-29 DIAGNOSIS — I10 ESSENTIAL HYPERTENSION WITH GOAL BLOOD PRESSURE LESS THAN 140/90: ICD-10-CM

## 2018-05-29 DIAGNOSIS — I80.299 PHLEBITIS AND THROMBOPHLEBITIS OF OTHER DEEP VESSELS OF UNSPECIFIED LOWER EXTREMITY (HCC): ICD-10-CM

## 2018-05-29 RX ORDER — DICYCLOMINE HYDROCHLORIDE 10 MG/1
10 CAPSULE ORAL 4 TIMES DAILY
Qty: 120 CAP | Refills: 5 | Status: SHIPPED | OUTPATIENT
Start: 2018-05-29 | End: 2018-11-26 | Stop reason: SDUPTHER

## 2018-05-29 RX ORDER — NADOLOL 40 MG/1
20 TABLET ORAL DAILY
Qty: 15 TAB | Refills: 5 | Status: SHIPPED | OUTPATIENT
Start: 2018-05-29 | End: 2018-11-26 | Stop reason: SDUPTHER

## 2018-05-29 RX ORDER — WARFARIN 2.5 MG/1
TABLET ORAL
Qty: 30 TAB | Refills: 2 | Status: SHIPPED | OUTPATIENT
Start: 2018-05-29 | End: 2018-10-29 | Stop reason: SDUPTHER

## 2018-05-29 RX ORDER — CAPTOPRIL 25 MG/1
25 TABLET ORAL 2 TIMES DAILY
Qty: 60 TAB | Refills: 5 | Status: SHIPPED | OUTPATIENT
Start: 2018-05-29 | End: 2018-11-26 | Stop reason: SDUPTHER

## 2018-05-29 RX ORDER — METFORMIN HYDROCHLORIDE 1000 MG/1
1000 TABLET ORAL 2 TIMES DAILY WITH MEALS
Qty: 60 TAB | Refills: 5 | Status: SHIPPED | OUTPATIENT
Start: 2018-05-29 | End: 2018-11-26 | Stop reason: SDUPTHER

## 2018-05-29 RX ORDER — TAMSULOSIN HYDROCHLORIDE 0.4 MG/1
0.4 CAPSULE ORAL DAILY
Qty: 30 CAP | Refills: 5 | Status: SHIPPED | OUTPATIENT
Start: 2018-05-29 | End: 2018-11-26 | Stop reason: SDUPTHER

## 2018-05-29 RX ORDER — HYDROCHLOROTHIAZIDE 25 MG/1
25 TABLET ORAL DAILY
Qty: 30 TAB | Refills: 5 | Status: SHIPPED | OUTPATIENT
Start: 2018-05-29 | End: 2018-11-26 | Stop reason: SDUPTHER

## 2018-05-29 RX ORDER — GLIMEPIRIDE 2 MG/1
TABLET ORAL
Qty: 30 TAB | Refills: 5 | Status: SHIPPED | OUTPATIENT
Start: 2018-05-29 | End: 2018-11-26 | Stop reason: SDUPTHER

## 2018-05-29 NOTE — TELEPHONE ENCOUNTER
Requested Prescriptions     Pending Prescriptions Disp Refills    hydroCHLOROthiazide (HYDRODIURIL) 25 mg tablet 30 Tab 5     Sig: Take 1 Tab by mouth daily.  nadolol (CORGARD) 40 mg tablet 15 Tab 5     Sig: Take 0.5 Tabs by mouth daily.  dicyclomine (BENTYL) 10 mg capsule 120 Cap 5     Sig: Take 1 Cap by mouth four (4) times daily.  glimepiride (AMARYL) 2 mg tablet 30 Tab 5     Sig: TAKE ONE TABLET BY MOUTH EVERY MORNING FOR DIABETES    warfarin (COUMADIN) 2.5 mg tablet 30 Tab 2     Si tab daily    captopril (CAPOTEN) 25 mg tablet 60 Tab 5     Sig: Take 1 Tab by mouth two (2) times a day.  tamsulosin (FLOMAX) 0.4 mg capsule 30 Cap 5     Sig: Take 1 Cap by mouth daily.  metFORMIN (GLUCOPHAGE) 1,000 mg tablet 60 Tab 5     Sig: Take 1 Tab by mouth two (2) times daily (with meals).

## 2018-06-28 LAB
INR BLD: 2.2
PT POC: NORMAL SECONDS
VALID INTERNAL CONTROL?: YES

## 2018-06-28 NOTE — PROGRESS NOTES
Mr. Priscilla Salgado is here today for anticoagulation monitoring for the diagnosis of Atrial Fibrillation. His INR goal is 2.0-3.0 and his current Coumadin dose is 5mg Thursday and Friday 2.5mg all other . Today's findings include an INR of 2.2 (normal INR range 0.8-1.2). Considering Mr. Escobedo's past history, todays findings, and per the coumadin policy/protocol, Mr. Katherine Virgen was instructed to take Coumadin as follows,  5mg Thursday and Friday 2.5mg all other . Keo Goode He was also instructed to schedule an appointment in 3 weeks prior to leaving for an INR check. A full discussion of the nature of anticoagulants has been carried out. A full discussion of the need for frequent and regular monitoring, precise dosage adjustment and compliance was stressed. Side effects of potential bleeding were discussed and Mr. Katherine Virgen was instructed to call [phone] if there are any signs of abnormal bleeding. Mr. Katherine Virgen was instructed to avoid any OTC items containing aspirin or ibuprofen and prior to starting any new OTC products to consult with his physician or pharmacist to ensure no drug interactions are present. Mr. Katherine Virgen was instructed to avoid any major changes in his general diet and to avoid alcohol consumption. .  Mr. Escobedo verbalized his understanding of all instructions and will call the office with any questions, concerns, or signs of abnormal bleeding or blood clot.

## 2018-07-02 ENCOUNTER — ANTI-COAG VISIT (OUTPATIENT)
Dept: INTERNAL MEDICINE CLINIC | Age: 73
End: 2018-07-02

## 2018-07-02 DIAGNOSIS — I80.299 PHLEBITIS AND THROMBOPHLEBITIS OF OTHER DEEP VESSELS OF UNSPECIFIED LOWER EXTREMITY (HCC): ICD-10-CM

## 2018-07-02 LAB
INR BLD: 2.4
PT POC: NORMAL SECONDS
VALID INTERNAL CONTROL?: YES

## 2018-07-02 NOTE — PROGRESS NOTES
Mr. Fernando Pérez is here today for anticoagulation monitoring for the diagnosis of Atrial Fibrillation. His INR goal is 2.0-3.0 and his current Coumadin dose is 2.5mg daily . Today's findings include an INR of 2.4 (normal INR range 0.8-1.2) . Considering Mr. Escobedo's past history, todays findings, and per the coumadin policy/protocol, Mr. Sukumar Minaya was instructed to take Coumadin as follows,  2.5 mg . He was also instructed to schedule an appointment in 3 weeks prior to leaving for an INR check. A full discussion of the nature of anticoagulants has been carried out. A full discussion of the need for frequent and regular monitoring, precise dosage adjustment and compliance was stressed. Side effects of potential bleeding were discussed and Mr. Sukumar Minaya was instructed to call [phone] if there are any signs of abnormal bleeding. Mr. Sukumar Minaya was instructed to avoid any OTC items containing aspirin or ibuprofen and prior to starting any new OTC products to consult with his physician or pharmacist to ensure no drug interactions are present. Mr. Sukumar Minaya was instructed to avoid any major changes in his general diet and to avoid alcohol consumption. .  Mr. Escobedo verbalized his understanding of all instructions and will call the office with any questions, concerns, or signs of abnormal bleeding or blood clot.

## 2018-08-13 ENCOUNTER — ANTI-COAG VISIT (OUTPATIENT)
Dept: INTERNAL MEDICINE CLINIC | Age: 73
End: 2018-08-13

## 2018-08-13 DIAGNOSIS — I80.299 PHLEBITIS AND THROMBOPHLEBITIS OF OTHER DEEP VESSELS OF UNSPECIFIED LOWER EXTREMITY (HCC): ICD-10-CM

## 2018-08-13 LAB
INR BLD: 2.8
PT POC: NORMAL SECONDS
VALID INTERNAL CONTROL?: YES

## 2018-08-13 NOTE — PROGRESS NOTES
Mr. Rosenda Elizabeth is here today for anticoagulation monitoring for the diagnosis of Atrial Fibrillation. His INR goal is 2.0-3.0 and his current Coumadin dose is 2.5mg daily. Today's findings include an INR of 2.8 (normal INR range 0.8-1.2) . Considering Mr. Escobedo's past history, todays findings, and per the coumadin policy/protocol, Mr. Ambrose Yi was instructed to take Coumadin as follows,  2.5mg. He was also instructed to schedule an appointment in 2 weeks prior to leaving for an INR check. A full discussion of the nature of anticoagulants has been carried out. A full discussion of the need for frequent and regular monitoring, precise dosage adjustment and compliance was stressed. Side effects of potential bleeding were discussed and Mr. Ambrose Yi was instructed to call [phone] if there are any signs of abnormal bleeding. Mr. Ambrose Yi was instructed to avoid any OTC items containing aspirin or ibuprofen and prior to starting any new OTC products to consult with his physician or pharmacist to ensure no drug interactions are present. Mr. Ambrose Yi was instructed to avoid any major changes in his general diet and to avoid alcohol consumption. .      Mr. Escobedo verbalized his understanding of all instructions and will call the office with any questions, concerns, or signs of abnormal bleeding or blood clot.

## 2018-10-09 ENCOUNTER — ANTI-COAG VISIT (OUTPATIENT)
Dept: INTERNAL MEDICINE CLINIC | Age: 73
End: 2018-10-09

## 2018-10-09 DIAGNOSIS — I80.299 PHLEBITIS AND THROMBOPHLEBITIS OF OTHER DEEP VESSELS OF UNSPECIFIED LOWER EXTREMITY (HCC): ICD-10-CM

## 2018-10-09 LAB
INR BLD: 3.1
PT POC: NORMAL SECONDS
VALID INTERNAL CONTROL?: YES

## 2018-10-26 ENCOUNTER — HOSPITAL ENCOUNTER (OUTPATIENT)
Dept: LAB | Age: 73
Discharge: HOME | End: 2018-10-26
Payer: MEDICARE

## 2018-10-26 ENCOUNTER — OFFICE VISIT (OUTPATIENT)
Dept: INTERNAL MEDICINE CLINIC | Age: 73
End: 2018-10-26

## 2018-10-26 VITALS
BODY MASS INDEX: 34.82 KG/M2 | OXYGEN SATURATION: 97 % | HEART RATE: 72 BPM | DIASTOLIC BLOOD PRESSURE: 78 MMHG | HEIGHT: 65 IN | SYSTOLIC BLOOD PRESSURE: 124 MMHG | WEIGHT: 209 LBS

## 2018-10-26 DIAGNOSIS — E78.2 MIXED HYPERLIPIDEMIA: ICD-10-CM

## 2018-10-26 DIAGNOSIS — E11.9 CONTROLLED TYPE 2 DIABETES MELLITUS WITHOUT COMPLICATION, WITHOUT LONG-TERM CURRENT USE OF INSULIN (HCC): Primary | ICD-10-CM

## 2018-10-26 DIAGNOSIS — I80.299 PHLEBITIS AND THROMBOPHLEBITIS OF OTHER DEEP VESSELS OF UNSPECIFIED LOWER EXTREMITY (HCC): ICD-10-CM

## 2018-10-26 DIAGNOSIS — M54.50 ACUTE BILATERAL LOW BACK PAIN WITHOUT SCIATICA: ICD-10-CM

## 2018-10-26 DIAGNOSIS — I10 ESSENTIAL HYPERTENSION WITH GOAL BLOOD PRESSURE LESS THAN 140/90: ICD-10-CM

## 2018-10-26 PROBLEM — E66.01 SEVERE OBESITY (HCC): Status: ACTIVE | Noted: 2018-10-26

## 2018-10-26 PROBLEM — E11.21 TYPE 2 DIABETES WITH NEPHROPATHY (HCC): Status: ACTIVE | Noted: 2018-10-26

## 2018-10-26 LAB
INR PPP: 1.7 (ref 0.8–1.2)
PROTHROMBIN TIME: 19.7 SEC (ref 11.5–15.2)

## 2018-10-26 PROCEDURE — 36415 COLL VENOUS BLD VENIPUNCTURE: CPT | Performed by: INTERNAL MEDICINE

## 2018-10-26 PROCEDURE — 85610 PROTHROMBIN TIME: CPT | Performed by: INTERNAL MEDICINE

## 2018-10-26 RX ORDER — CLOTRIMAZOLE AND BETAMETHASONE DIPROPIONATE 10; .64 MG/G; MG/G
CREAM TOPICAL
Qty: 45 G | Refills: 3 | Status: SHIPPED | OUTPATIENT
Start: 2018-10-26 | End: 2019-04-26 | Stop reason: ALTCHOICE

## 2018-10-26 NOTE — PROGRESS NOTES
The patient presents to the office today with the chief complaint of Diabetes Mellitus HPI The patient remains on oral medications for type II diabetes mellitus. he does not check blood sugars. The patient has not had any documented low sugars but he has episodic sweats. The patient remains on medications for hypertension. he is tolerating the medications well. The patient is on a low cholsesterol diety for mixed hyperlipidemia. The patient remains on anticoagulation due to LE phlebitis with superficial DVT Review of Systems Respiratory: Negative for shortness of breath. Cardiovascular: Negative for chest pain and leg swelling. Allergies Allergen Reactions  Ampicillin Hives Became allergic 30 years ago.  Keflex [Cephalexin] Itching  Lonox [Diphenoxylate-Atropine] Shortness of Breath  Penicillin V Potassium Hives Current Outpatient Medications Medication Sig Dispense Refill  clotrimazole-betamethasone (LOTRISONE) topical cream Use a small amount to affected area twice daily 45 g 3  
 hydroCHLOROthiazide (HYDRODIURIL) 25 mg tablet Take 1 Tab by mouth daily. 30 Tab 5  
 nadolol (CORGARD) 40 mg tablet Take 0.5 Tabs by mouth daily. 15 Tab 5  
 dicyclomine (BENTYL) 10 mg capsule Take 1 Cap by mouth four (4) times daily. 120 Cap 5  
 glimepiride (AMARYL) 2 mg tablet TAKE ONE TABLET BY MOUTH EVERY MORNING FOR DIABETES 30 Tab 5  warfarin (COUMADIN) 2.5 mg tablet 1 tab daily 30 Tab 2  captopril (CAPOTEN) 25 mg tablet Take 1 Tab by mouth two (2) times a day. 60 Tab 5  tamsulosin (FLOMAX) 0.4 mg capsule Take 1 Cap by mouth daily. 30 Cap 5  
 metFORMIN (GLUCOPHAGE) 1,000 mg tablet Take 1 Tab by mouth two (2) times daily (with meals). 60 Tab 5  cetirizine (ZYRTEC) 10 mg tablet Take  by mouth.  aspirin delayed-release 81 mg tablet Take  by mouth daily. Past Medical History:  
Diagnosis Date  Allergic rhinitis  Benign paroxysmal positional vertigo  Dermatophytosis  Hypertrophy (benign) of prostate  Phlebitis and thrombophlebitis of other deep vessels of unspecified lower extremity ([facility] [address]  [facility] Past Surgical History:  
Procedure Laterality Date  HX COLONOSCOPY polyps Social History Socioeconomic History  Marital status: UNKNOWN Spouse name: Not on file  Number of children: Not on file  Years of education: Not on file  Highest education level: Not on file Social Needs  Financial resource strain: Not on file  Food insecurity - worry: Not on file  Food insecurity - inability: Not on file  Transportation needs - medical: Not on file  Transportation needs - non-medical: Not on file Occupational History  Not on file Tobacco Use  Smoking status: Former Smoker  Smokeless tobacco: Never Used Substance and Sexual Activity  Alcohol use: No  
 Drug use: No  
 Sexual activity: No  
Other Topics Concern  Not on file Social History Narrative  Not on file Patient does not have an advanced directive on file Visit Vitals /78 (BP 1 Location: Left arm, BP Patient Position: Sitting) Pulse 72 Ht 5' 5.25\" (1.657 m) Wt 209 lb (94.8 kg) SpO2 97% BMI 34.51 kg/m² Physical Exam  
No Cervical Lymphadenopathy No Supraclavicular Lymphadenopathy Thyroid is Normal 
Lungs are normal to percussion. Clear to auscultation Heart:  S1 S2 are normal, No gallops, No mummers No Carotid Bruits Abdomen:  Normal Bowel Sounds. No tenderness. No masses. No Hepatomegaly or Splenomegaly DM Foot:   
Diabetic foot exam:  
 
Left Foot: 
 Visual Exam: normal  
 Pulse DP: 2+ (normal) Filament test: normal sensation Vibratory sensation: normal 
   
Right Foot: 
 Visual Exam: normal  
 Pulse DP: 2+ (normal) Filament test: normal sensation  Vibratory sensation: normal 
 
 
 
 BMI:  The patient was advised to limit calories to 1800 suze and carbohydrates to 100 grams daily Hospital Outpatient Visit on 10/26/2018 Component Date Value Ref Range Status  Prothrombin time 10/26/2018 19.7* 11.5 - 15.2 sec Final  
 INR 10/26/2018 1.7* 0.8 - 1.2   Final  
 Comment:            INR Therapeutic Ranges (on stable oral anticoagulant): INDICATION                INR 
DVT/PE/Atrial Fib          2.0-3.0 MI/Mechanical Heart Valve  2.5-3.5 Anti-Coag visit on 10/09/2018 Component Date Value Ref Range Status  VALID INTERNAL CONTROL POC 10/09/2018 Yes   Final  
 INR POC 10/09/2018 3.1   Final  
Anti-Coag visit on 08/13/2018 Component Date Value Ref Range Status  VALID INTERNAL CONTROL POC 08/13/2018 Yes   Final  
 INR POC 08/13/2018 2.8   Final  
 
 
. Results for orders placed or performed during the hospital encounter of 10/26/18 PROTHROMBIN TIME + INR Result Value Ref Range Prothrombin time 19.7 (H) 11.5 - 15.2 sec INR 1.7 (H) 0.8 - 1.2 Assessment / Plan ICD-10-CM ICD-9-CM 1. Controlled type 2 diabetes mellitus without complication, without long-term current use of insulin (HCC) E11.9 250.00  DIABETES FOOT EXAM  
2. Essential hypertension with goal blood pressure less than 140/90 I10 401.9 3. Mixed hyperlipidemia E78.2 272.2 4. Acute bilateral low back pain without sciatica M54.5 724.2   
  338.19   
5. Phlebitis and thrombophlebitis of other deep vessels of unspecified lower extremity (HCC) I80.299 451.19 PROTHROMBIN TIME + INR  
 
 
he was advised to continue his maintenance medications Protime checked Follow-up Disposition: 
Return in about 6 months (around 4/26/2019). I asked Jackie Thomason if he has any questions and I answered the questions. Tatianna Thomason states that he understands the treatment plan and agrees with the treatment plan

## 2018-10-29 RX ORDER — WARFARIN 2.5 MG/1
TABLET ORAL
Qty: 30 TAB | Refills: 1 | Status: SHIPPED | OUTPATIENT
Start: 2018-10-29 | End: 2018-11-26 | Stop reason: SDUPTHER

## 2018-11-26 ENCOUNTER — ANTI-COAG VISIT (OUTPATIENT)
Dept: INTERNAL MEDICINE CLINIC | Age: 73
End: 2018-11-26

## 2018-11-26 DIAGNOSIS — Z12.5 PROSTATE CANCER SCREENING: ICD-10-CM

## 2018-11-26 DIAGNOSIS — I80.299 PHLEBITIS AND THROMBOPHLEBITIS OF OTHER DEEP VESSELS OF UNSPECIFIED LOWER EXTREMITY (HCC): ICD-10-CM

## 2018-11-26 DIAGNOSIS — I10 ESSENTIAL HYPERTENSION WITH GOAL BLOOD PRESSURE LESS THAN 140/90: ICD-10-CM

## 2018-11-26 LAB
INR BLD: 2.3
PT POC: NORMAL SECONDS
VALID INTERNAL CONTROL?: YES

## 2018-11-26 RX ORDER — CAPTOPRIL 25 MG/1
25 TABLET ORAL 2 TIMES DAILY
Qty: 60 TAB | Refills: 5 | Status: SHIPPED | OUTPATIENT
Start: 2018-11-26 | End: 2019-04-26 | Stop reason: ALTCHOICE

## 2018-11-26 RX ORDER — NADOLOL 40 MG/1
20 TABLET ORAL DAILY
Qty: 15 TAB | Refills: 5 | Status: SHIPPED | OUTPATIENT
Start: 2018-11-26 | End: 2019-05-20 | Stop reason: SDUPTHER

## 2018-11-26 RX ORDER — GLIMEPIRIDE 2 MG/1
TABLET ORAL
Qty: 30 TAB | Refills: 5 | Status: SHIPPED | OUTPATIENT
Start: 2018-11-26 | End: 2019-05-20 | Stop reason: SDUPTHER

## 2018-11-26 RX ORDER — DICYCLOMINE HYDROCHLORIDE 10 MG/1
10 CAPSULE ORAL 4 TIMES DAILY
Qty: 120 CAP | Refills: 5 | Status: SHIPPED | OUTPATIENT
Start: 2018-11-26 | End: 2019-05-20 | Stop reason: SDUPTHER

## 2018-11-26 RX ORDER — HYDROCHLOROTHIAZIDE 25 MG/1
25 TABLET ORAL DAILY
Qty: 30 TAB | Refills: 5 | Status: SHIPPED | OUTPATIENT
Start: 2018-11-26 | End: 2019-05-20 | Stop reason: SDUPTHER

## 2018-11-26 RX ORDER — TAMSULOSIN HYDROCHLORIDE 0.4 MG/1
0.4 CAPSULE ORAL DAILY
Qty: 30 CAP | Refills: 5 | Status: SHIPPED | OUTPATIENT
Start: 2018-11-26 | End: 2019-05-20 | Stop reason: SDUPTHER

## 2018-11-26 RX ORDER — WARFARIN 2.5 MG/1
TABLET ORAL
Qty: 30 TAB | Refills: 1 | Status: SHIPPED | OUTPATIENT
Start: 2018-11-26 | End: 2019-02-21 | Stop reason: SDUPTHER

## 2018-11-26 RX ORDER — METFORMIN HYDROCHLORIDE 1000 MG/1
1000 TABLET ORAL 2 TIMES DAILY WITH MEALS
Qty: 60 TAB | Refills: 5 | Status: SHIPPED | OUTPATIENT
Start: 2018-11-26 | End: 2019-05-20 | Stop reason: SDUPTHER

## 2018-11-26 NOTE — TELEPHONE ENCOUNTER
Requested Prescriptions     Pending Prescriptions Disp Refills    captopril (CAPOTEN) 25 mg tablet 60 Tab 5     Sig: Take 1 Tab by mouth two (2) times a day.  warfarin (COUMADIN) 2.5 mg tablet 30 Tab 1     Sig: TAKE 1 TABLET BY MOUTH ONCE DAILY    glimepiride (AMARYL) 2 mg tablet 30 Tab 5     Sig: TAKE ONE TABLET BY MOUTH EVERY MORNING FOR DIABETES    hydroCHLOROthiazide (HYDRODIURIL) 25 mg tablet 30 Tab 5     Sig: Take 1 Tab by mouth daily.  dicyclomine (BENTYL) 10 mg capsule 120 Cap 5     Sig: Take 1 Cap by mouth four (4) times daily.  tamsulosin (FLOMAX) 0.4 mg capsule 30 Cap 5     Sig: Take 1 Cap by mouth daily.  nadolol (CORGARD) 40 mg tablet 15 Tab 5     Sig: Take 0.5 Tabs by mouth daily.  metFORMIN (GLUCOPHAGE) 1,000 mg tablet 60 Tab 5     Sig: Take 1 Tab by mouth two (2) times daily (with meals).

## 2018-11-26 NOTE — PROGRESS NOTES
Mr. Leif Jaquez is here today for anticoagulation monitoring for the diagnosis of DVT. His INR goal is 2.0-3.0 and his current Coumadin dose is 2.5 daily. Today's findings include an INR of 2.3 (normal INR range 0.8-1.2) Considering Mr. Escobedo's past history, todays findings, and per the coumadin policy/protocol, Mr. Leonel Weeks was instructed to take Coumadin as follows,  Continue same dose . He was also instructed to schedule an appointment in 3 weeks prior to leaving for an INR check. A full discussion of the nature of anticoagulants has been carried out. A full discussion of the need for frequent and regular monitoring, precise dosage adjustment and compliance was stressed. Side effects of potential bleeding were discussed and Mr. Leonel Weeks was instructed to call [phone] if there are any signs of abnormal bleeding. Mr. Leonel Weeks was instructed to avoid any OTC items containing aspirin or ibuprofen and prior to starting any new OTC products to consult with his physician or pharmacist to ensure no drug interactions are present. Mr. Leonel Weeks was instructed to avoid any major changes in his general diet and to avoid alcohol consumption. . 
 
Mr. Escobedo verbalized his understanding of all instructions and will call the office with any questions, concerns, or signs of abnormal bleeding or blood clot.

## 2019-01-14 ENCOUNTER — CLINICAL SUPPORT (OUTPATIENT)
Dept: INTERNAL MEDICINE CLINIC | Age: 74
End: 2019-01-14

## 2019-01-14 DIAGNOSIS — I80.299 PHLEBITIS AND THROMBOPHLEBITIS OF OTHER DEEP VESSELS OF UNSPECIFIED LOWER EXTREMITY (HCC): Primary | ICD-10-CM

## 2019-01-25 LAB
INR BLD: 1.9
PT POC: NORMAL SECONDS
VALID INTERNAL CONTROL?: YES

## 2019-01-25 NOTE — PROGRESS NOTES
Mr. Hugh Simmons is here today for anticoagulation monitoring for the diagnosis of Atrial Fibrillation. His INR goal is 2.0-3.0 and his current Coumadin dose is 2.5 mg daily. Today's findings include an INR of 1.9 (normal INR range 0.8-1.2) . Considering Mr. Escobedo's past history, todays findings, and per the coumadin policy/protocol, Mr. Merline Bullion was instructed to take Coumadin as follows,  same. He was also instructed to schedule an appointment in 3 weeks prior to leaving for an INR check. A full discussion of the nature of anticoagulants has been carried out. A full discussion of the need for frequent and regular monitoring, precise dosage adjustment and compliance was stressed. Side effects of potential bleeding were discussed and Mr. Merline Bullion was instructed to call [phone] if there are any signs of abnormal bleeding. Mr. Merline Bullion was instructed to avoid any OTC items containing aspirin or ibuprofen and prior to starting any new OTC products to consult with his physician or pharmacist to ensure no drug interactions are present. Mr. Merline Bullion was instructed to avoid any major changes in his general diet and to avoid alcohol consumption. . 
 
 
 
Mr. Escobedo verbalized his understanding of all instructions and will call the office with any questions, concerns, or signs of abnormal bleeding or blood clot.

## 2019-02-21 RX ORDER — WARFARIN 2.5 MG/1
TABLET ORAL
Qty: 30 TAB | Refills: 5 | Status: SHIPPED | OUTPATIENT
Start: 2019-02-21 | End: 2019-05-20 | Stop reason: SDUPTHER

## 2019-02-22 ENCOUNTER — ANTI-COAG VISIT (OUTPATIENT)
Dept: INTERNAL MEDICINE CLINIC | Age: 74
End: 2019-02-22

## 2019-02-22 DIAGNOSIS — I80.299 PHLEBITIS AND THROMBOPHLEBITIS OF OTHER DEEP VESSELS OF UNSPECIFIED LOWER EXTREMITY (HCC): ICD-10-CM

## 2019-02-22 LAB
INR BLD: 2
PT POC: NORMAL SECONDS
VALID INTERNAL CONTROL?: YES

## 2019-02-25 NOTE — PROGRESS NOTES
Mr. Tiffany Dumont is here today for anticoagulation monitoring for the diagnosis of Phlebitis. His INR goal is 2.0-3.0 and his current Coumadin dose is 2.5 mg daily. Today's findings include an INR of 2.0 (normal INR range 0.8-1.2). Considering Mr. Escobedo's past history, todays findings, and per the coumadin policy/protocol, Mr. Sushil Villanueva was instructed to take Coumadin as follows,  2.5 mg daily. He was also instructed to schedule an appointment in 3 weeks prior to leaving for an INR check. A full discussion of the nature of anticoagulants has been carried out. A full discussion of the need for frequent and regular monitoring, precise dosage adjustment and compliance was stressed. Side effects of potential bleeding were discussed and Mr. Sushil Villanueva was instructed to call [phone] if there are any signs of abnormal bleeding. Mr. Sushil Villanueva was instructed to avoid any OTC items containing aspirin or ibuprofen and prior to starting any new OTC products to consult with his physician or pharmacist to ensure no drug interactions are present. Mr. Sushil Villanueva was instructed to avoid any major changes in his general diet and to avoid alcohol consumption. . 
 
 
Mr. Escobedo verbalized his understanding of all instructions and will call the office with any questions, concerns, or signs of abnormal bleeding or blood clot.

## 2019-03-28 ENCOUNTER — CLINICAL SUPPORT (OUTPATIENT)
Dept: FAMILY MEDICINE CLINIC | Facility: CLINIC | Age: 74
End: 2019-03-28

## 2019-03-28 DIAGNOSIS — I80.299 PHLEBITIS AND THROMBOPHLEBITIS OF OTHER DEEP VESSELS OF UNSPECIFIED LOWER EXTREMITY (HCC): ICD-10-CM

## 2019-03-28 LAB
INR BLD: 1.7
PT POC: NORMAL SECONDS
VALID INTERNAL CONTROL?: YES

## 2019-03-28 NOTE — PROGRESS NOTES
Mr. Mamadou Headley is here today for anticoagulation monitoring for the diagnosis of Atrial Fibrillation. His INR goal is 2.0-3.0 and his current Coumadin dose is 2.5 mg daily. Patient reported eating 2 servings of broccoli Today's findings include an INR of 1.7 (normal INR range 0.8-1.2). Considering Mr. Escobedo's past history, todays findings, and per the coumadin policy/protocol, Mr. Khalida Huffman was instructed to take Coumadin as follows,  2.5 mg daily and no greens. He was also instructed to schedule an appointment in 2 weeks prior to leaving for an INR check. A full discussion of the nature of anticoagulants has been carried out. A full discussion of the need for frequent and regular monitoring, precise dosage adjustment and compliance was stressed. Side effects of potential bleeding were discussed and Mr. Khalida Huffman was instructed to call [phone] if there are any signs of abnormal bleeding. Mr. Khalida Huffman was instructed to avoid any OTC items containing aspirin or ibuprofen and prior to starting any new OTC products to consult with his physician or pharmacist to ensure no drug interactions are present. Mr. Khalida Huffman was instructed to avoid any major changes in his general diet and to avoid alcohol consumption. . 
 
 
Mr. Escobedo verbalized his understanding of all instructions and will call the office with any questions, concerns, or signs of abnormal bleeding or blood clot.

## 2019-04-26 ENCOUNTER — OFFICE VISIT (OUTPATIENT)
Dept: FAMILY MEDICINE CLINIC | Facility: CLINIC | Age: 74
End: 2019-04-26

## 2019-04-26 ENCOUNTER — HOSPITAL ENCOUNTER (OUTPATIENT)
Dept: LAB | Age: 74
Discharge: HOME | End: 2019-04-26
Payer: MEDICARE

## 2019-04-26 VITALS
RESPIRATION RATE: 16 BRPM | HEART RATE: 51 BPM | SYSTOLIC BLOOD PRESSURE: 120 MMHG | OXYGEN SATURATION: 98 % | BODY MASS INDEX: 35.32 KG/M2 | HEIGHT: 65 IN | DIASTOLIC BLOOD PRESSURE: 82 MMHG | TEMPERATURE: 97.4 F | WEIGHT: 212 LBS

## 2019-04-26 DIAGNOSIS — E11.21 TYPE 2 DIABETES WITH NEPHROPATHY (HCC): ICD-10-CM

## 2019-04-26 DIAGNOSIS — I80.299 PHLEBITIS AND THROMBOPHLEBITIS OF OTHER DEEP VESSELS OF UNSPECIFIED LOWER EXTREMITY (HCC): ICD-10-CM

## 2019-04-26 DIAGNOSIS — M79.604 RIGHT LEG PAIN: Primary | ICD-10-CM

## 2019-04-26 DIAGNOSIS — E78.2 MIXED HYPERLIPIDEMIA: ICD-10-CM

## 2019-04-26 DIAGNOSIS — M17.11 PRIMARY OSTEOARTHRITIS OF RIGHT KNEE: ICD-10-CM

## 2019-04-26 DIAGNOSIS — R07.9 CHEST PAIN, UNSPECIFIED TYPE: ICD-10-CM

## 2019-04-26 LAB
ALBUMIN SERPL-MCNC: 4 G/DL (ref 3.4–5)
ALBUMIN/GLOB SERPL: 1.1 {RATIO} (ref 0.8–1.7)
ALP SERPL-CCNC: 58 U/L (ref 45–117)
ALT SERPL-CCNC: 20 U/L (ref 16–61)
ANION GAP SERPL CALC-SCNC: 6 MMOL/L (ref 3–18)
AST SERPL-CCNC: 19 U/L (ref 15–37)
BASOPHILS # BLD: 0 K/UL (ref 0–0.1)
BASOPHILS NFR BLD: 1 % (ref 0–2)
BILIRUB SERPL-MCNC: 0.5 MG/DL (ref 0.2–1)
BUN SERPL-MCNC: 27 MG/DL (ref 7–18)
BUN/CREAT SERPL: 18 (ref 12–20)
CALCIUM SERPL-MCNC: 8.8 MG/DL (ref 8.5–10.1)
CHLORIDE SERPL-SCNC: 105 MMOL/L (ref 100–108)
CHOLEST SERPL-MCNC: 215 MG/DL
CO2 SERPL-SCNC: 24 MMOL/L (ref 21–32)
CREAT SERPL-MCNC: 1.5 MG/DL (ref 0.6–1.3)
DIFFERENTIAL METHOD BLD: ABNORMAL
EOSINOPHIL # BLD: 0.4 K/UL (ref 0–0.4)
EOSINOPHIL NFR BLD: 5 % (ref 0–5)
ERYTHROCYTE [DISTWIDTH] IN BLOOD BY AUTOMATED COUNT: 14.6 % (ref 11.6–14.5)
EST. AVERAGE GLUCOSE BLD GHB EST-MCNC: 148 MG/DL
GLOBULIN SER CALC-MCNC: 3.5 G/DL (ref 2–4)
GLUCOSE SERPL-MCNC: 111 MG/DL (ref 74–99)
HBA1C MFR BLD: 6.8 % (ref 4.2–5.6)
HCT VFR BLD AUTO: 40.6 % (ref 36–48)
HDLC SERPL-MCNC: 48 MG/DL (ref 40–60)
HDLC SERPL: 4.5 {RATIO} (ref 0–5)
HGB BLD-MCNC: 13.3 G/DL (ref 13–16)
INR PPP: 2.2 (ref 0.8–1.2)
LDLC SERPL CALC-MCNC: 133.4 MG/DL (ref 0–100)
LIPID PROFILE,FLP: ABNORMAL
LYMPHOCYTES # BLD: 1.8 K/UL (ref 0.9–3.6)
LYMPHOCYTES NFR BLD: 23 % (ref 21–52)
MCH RBC QN AUTO: 31 PG (ref 24–34)
MCHC RBC AUTO-ENTMCNC: 32.8 G/DL (ref 31–37)
MCV RBC AUTO: 94.6 FL (ref 74–97)
MONOCYTES # BLD: 0.4 K/UL (ref 0.05–1.2)
MONOCYTES NFR BLD: 6 % (ref 3–10)
NEUTS SEG # BLD: 5 K/UL (ref 1.8–8)
NEUTS SEG NFR BLD: 65 % (ref 40–73)
PLATELET # BLD AUTO: 174 K/UL (ref 135–420)
PMV BLD AUTO: 11.1 FL (ref 9.2–11.8)
POTASSIUM SERPL-SCNC: 4.7 MMOL/L (ref 3.5–5.5)
PROT SERPL-MCNC: 7.5 G/DL (ref 6.4–8.2)
PROTHROMBIN TIME: 24.8 SEC (ref 11.5–15.2)
RBC # BLD AUTO: 4.29 M/UL (ref 4.7–5.5)
SODIUM SERPL-SCNC: 135 MMOL/L (ref 136–145)
TRIGL SERPL-MCNC: 168 MG/DL (ref ?–150)
VLDLC SERPL CALC-MCNC: 33.6 MG/DL
WBC # BLD AUTO: 7.7 K/UL (ref 4.6–13.2)

## 2019-04-26 PROCEDURE — 36415 COLL VENOUS BLD VENIPUNCTURE: CPT

## 2019-04-26 PROCEDURE — 83036 HEMOGLOBIN GLYCOSYLATED A1C: CPT

## 2019-04-26 PROCEDURE — 82043 UR ALBUMIN QUANTITATIVE: CPT

## 2019-04-26 PROCEDURE — 80053 COMPREHEN METABOLIC PANEL: CPT

## 2019-04-26 PROCEDURE — 85025 COMPLETE CBC W/AUTO DIFF WBC: CPT

## 2019-04-26 PROCEDURE — 85610 PROTHROMBIN TIME: CPT

## 2019-04-26 PROCEDURE — 80061 LIPID PANEL: CPT

## 2019-04-26 NOTE — PROGRESS NOTES
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 Patient has been on Coumadin for this for long period of time. Recently his INR was 1.8. Patient now complains of pain in his right thigh with an area of tenderness which is superficial.  The patient has recurrent swelling of his right leg recently the swelling has become demonstrably worse. The patient persists with chronic swelling in his left leg which has not changed. The patient denies dyspnea. The patient does note chest tightness when walking his dog in the morning. He stopped and the pain resolves. There are no's associated symptoms with this. 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 The patient's sugars have been running fairly well. The patient complains of pain and stiffness in his right knee. This is secondary to osteoarthritis. As of late this problem has become significantly worse at times impairing ambulation. ROS Positive for chest pain as above and right leg pain as above Negative for dyspnea. Allergies Allergen Reactions  Ampicillin Hives Became allergic 30 years ago.  Keflex [Cephalexin] Itching  Lonox [Diphenoxylate-Atropine] Shortness of Breath  Penicillin V Potassium Hives Current Outpatient Medications Medication Sig Dispense Refill  warfarin (COUMADIN) 2.5 mg tablet TAKE 1 TABLET BY MOUTH ONCE DAILY 30 Tab 5  
 glimepiride (AMARYL) 2 mg tablet TAKE ONE TABLET BY MOUTH EVERY MORNING FOR DIABETES 30 Tab 5  hydroCHLOROthiazide (HYDRODIURIL) 25 mg tablet Take 1 Tab by mouth daily. 30 Tab 5  
 dicyclomine (BENTYL) 10 mg capsule Take 1 Cap by mouth four (4) times daily. 120 Cap 5  tamsulosin (FLOMAX) 0.4 mg capsule Take 1 Cap by mouth daily. 30 Cap 5  
 nadolol (CORGARD) 40 mg tablet Take 0.5 Tabs by mouth daily. 15 Tab 5  metFORMIN (GLUCOPHAGE) 1,000 mg tablet Take 1 Tab by mouth two (2) times daily (with meals). 60 Tab 5  
 aspirin delayed-release 81 mg tablet Take  by mouth daily. Past Medical History:  
Diagnosis Date  Allergic rhinitis  Benign paroxysmal positional vertigo  Dermatophytosis  Hypertrophy (benign) of prostate  Phlebitis and thrombophlebitis of other deep vessels of unspecified lower extremity ([facility] [address]  [facility] Past Surgical History:  
Procedure Laterality Date  HX COLONOSCOPY polyps Social History Socioeconomic History  Marital status: UNKNOWN Spouse name: Not on file  Number of children: Not on file  Years of education: Not on file  Highest education level: Not on file Occupational History  Not on file Social Needs  Financial resource strain: Not on file  Food insecurity:  
  Worry: Not on file Inability: Not on file  Transportation needs:  
  Medical: Not on file Non-medical: Not on file Tobacco Use  Smoking status: Former Smoker  Smokeless tobacco: Never Used Substance and Sexual Activity  Alcohol use: No  
 Drug use: No  
 Sexual activity: Never Lifestyle  Physical activity:  
  Days per week: Not on file Minutes per session: Not on file  Stress: Not on file Relationships  Social connections:  
  Talks on phone: Not on file Gets together: Not on file Attends Restoration service: Not on file Active member of club or organization: Not on file Attends meetings of clubs or organizations: Not on file Relationship status: Not on file  Intimate partner violence:  
  Fear of current or ex partner: Not on file Emotionally abused: Not on file Physically abused: Not on file Forced sexual activity: Not on file Other Topics Concern  Not on file Social History Narrative  Not on file Patient does not have an advanced directive on file Visit Vitals /82 Pulse (!) 51 Temp 97.4 °F (36.3 °C) (Tympanic) Resp 16 Ht 5' 5\" (1.651 m) Wt 212 lb (96.2 kg) SpO2 98% BMI 35.28 kg/m² Physical Exam 
Lungs are clear to auscultation percussion Cardiac exam: S1-S2 normal, no gallops, no murmurs. No carotid bruits Abdomen: Bowel sounds normal, nontender, no masses, no hepatosplenomegaly Right leg with 2+ lower extremity woody edema there is mild tenderness superficially in the right inner thigh. Left lower extremity 1+ woody lower extremity edema Diabetic foot exam:  
 
Left Foot: 
 Visual Exam: normal  
 Pulse DP: 1+ (weak) Filament test: 5/6 Vibratory sensation: absent Right Foot: 
 Visual Exam: normal  
 Pulse DP: 2+ (normal) Filament test: 5/6 Vibratory sensation: diminished BMI:  The patient was advised to limit calories to 1800 suze and carbohydrates to 100 grams daily Hospital Outpatient Visit on 04/26/2019 Component Date Value Ref Range Status  WBC 04/26/2019 7.7  4.6 - 13.2 K/uL Final  
 RBC 04/26/2019 4.29* 4.70 - 5.50 M/uL Final  
 HGB 04/26/2019 13.3  13.0 - 16.0 g/dL Final  
 HCT 04/26/2019 40.6  36.0 - 48.0 % Final  
 MCV 04/26/2019 94.6  74.0 - 97.0 FL Final  
 MCH 04/26/2019 31.0  24.0 - 34.0 PG Final  
 MCHC 04/26/2019 32.8  31.0 - 37.0 g/dL Final  
 RDW 04/26/2019 14.6* 11.6 - 14.5 % Final  
 PLATELET 09/01/0396 147  135 - 420 K/uL Final  
 MPV 04/26/2019 11.1  9.2 - 11.8 FL Final  
 NEUTROPHILS 04/26/2019 65  40 - 73 % Final  
 LYMPHOCYTES 04/26/2019 23  21 - 52 % Final  
 MONOCYTES 04/26/2019 6  3 - 10 % Final  
 EOSINOPHILS 04/26/2019 5  0 - 5 % Final  
 BASOPHILS 04/26/2019 1  0 - 2 % Final  
 ABS. NEUTROPHILS 04/26/2019 5.0  1.8 - 8.0 K/UL Final  
 ABS. LYMPHOCYTES 04/26/2019 1.8  0.9 - 3.6 K/UL Final  
 ABS.  MONOCYTES 04/26/2019 0.4  0.05 - 1.2 K/UL Final  
  ABS. EOSINOPHILS 04/26/2019 0.4  0.0 - 0.4 K/UL Final  
 ABS. BASOPHILS 04/26/2019 0.0  0.0 - 0.1 K/UL Final  
 DF 04/26/2019 AUTOMATED    Final  
 Sodium 04/26/2019 135* 136 - 145 mmol/L Final  
 Potassium 04/26/2019 4.7  3.5 - 5.5 mmol/L Final  
 Chloride 04/26/2019 105  100 - 108 mmol/L Final  
 CO2 04/26/2019 24  21 - 32 mmol/L Final  
 Anion gap 04/26/2019 6  3.0 - 18 mmol/L Final  
 Glucose 04/26/2019 111* 74 - 99 mg/dL Final  
 BUN 04/26/2019 27* 7.0 - 18 MG/DL Final  
 Creatinine 04/26/2019 1.50* 0.6 - 1.3 MG/DL Final  
 BUN/Creatinine ratio 04/26/2019 18  12 - 20   Final  
 GFR est AA 04/26/2019 56* >60 ml/min/1.73m2 Final  
 GFR est non-AA 04/26/2019 46* >60 ml/min/1.73m2 Final  
 Comment: (NOTE) Estimated GFR is calculated using the Modification of Diet in Renal  
Disease (MDRD) Study equation, reported for both  Americans [facility]) and non- Americans (GFRNA), and normalized to 1.73m2  
body surface area. The physician must decide which value applies to  
the patient. The MDRD study equation should only be used in  
individuals age 25 or older. It has not been validated for the  
following: pregnant women, patients with serious comorbid conditions,  
or on certain medications, or persons with extremes of body size,  
muscle mass, or nutritional status.  Calcium 04/26/2019 8.8  8.5 - 10.1 MG/DL Final  
 Bilirubin, total 04/26/2019 0.5  0.2 - 1.0 MG/DL Final  
 ALT (SGPT) 04/26/2019 20  16 - 61 U/L Final  
 AST (SGOT) 04/26/2019 19  15 - 37 U/L Final  
 Alk.  phosphatase 04/26/2019 58  45 - 117 U/L Final  
 Protein, total 04/26/2019 7.5  6.4 - 8.2 g/dL Final  
 Albumin 04/26/2019 4.0  3.4 - 5.0 g/dL Final  
 Globulin 04/26/2019 3.5  2.0 - 4.0 g/dL Final  
 A-G Ratio 04/26/2019 1.1  0.8 - 1.7   Final  
 LIPID PROFILE 04/26/2019        Final  
 Cholesterol, total 04/26/2019 215* <200 MG/DL Final  
 Triglyceride 04/26/2019 168* <150 MG/DL Final  
 Comment: The drugs N-acetylcysteine (NAC) and 
Metamiszole have been found to cause falsely 
low results in this chemical assay. Please 
be sure to submit blood samples obtained BEFORE administration of either of these 
drugs to assure correct results.  HDL Cholesterol 04/26/2019 48  40 - 60 MG/DL Final  
 LDL, calculated 04/26/2019 133.4* 0 - 100 MG/DL Final  
 VLDL, calculated 04/26/2019 33.6  MG/DL Final  
 CHOL/HDL Ratio 04/26/2019 4.5  0 - 5.0   Final  
 Hemoglobin A1c 04/26/2019 6.8* 4.2 - 5.6 % Final  
 Comment: (NOTE) HbA1C Interpretive Ranges <5.7              Normal 
5.7 - 6.4         Consider Prediabetes >6.5              Consider Diabetes  Est. average glucose 04/26/2019 148  mg/dL Final  
 Comment: (NOTE) The eAG should be interpreted with patient characteristics in mind  
since ethnicity, interindividual differences, red cell lifespan,  
variation in rates of glycation, etc. may affect the validity of the  
calculation.  Microalbumin,urine random 04/26/2019 10.20* 0 - 3.0 MG/DL Final  
 Creatinine, urine 04/26/2019 84.00  30 - 125 mg/dL Final  
 Microalbumin/Creat ratio (mg/g cre* 04/26/2019 121* 0 - 30 mg/g Final  
 Prothrombin time 04/26/2019 24.8* 11.5 - 15.2 sec Final  
 INR 04/26/2019 2.2* 0.8 - 1.2   Final  
 Comment:            INR Therapeutic Ranges (on stable oral anticoagulant): INDICATION                INR 
DVT/PE/Atrial Fib          2.0-3.0 MI/Mechanical Heart Valve  2.5-3.5 Clinical Support on 03/28/2019 Component Date Value Ref Range Status  VALID INTERNAL CONTROL POC 03/28/2019 Yes   Final  
 INR POC 03/28/2019 1.7   Final  
Anti-Coag visit on 02/22/2019 Component Date Value Ref Range Status  VALID INTERNAL CONTROL POC 02/22/2019 Yes   Final  
 INR POC 02/22/2019 2.0   Final  
 
 
. Results for orders placed or performed during the hospital encounter of 04/26/19 CBC WITH AUTOMATED DIFF Result Value Ref Range WBC 7.7 4.6 - 13.2 K/uL  
 RBC 4.29 (L) 4.70 - 5.50 M/uL  
 HGB 13.3 13.0 - 16.0 g/dL HCT 40.6 36.0 - 48.0 % MCV 94.6 74.0 - 97.0 FL  
 MCH 31.0 24.0 - 34.0 PG  
 MCHC 32.8 31.0 - 37.0 g/dL  
 RDW 14.6 (H) 11.6 - 14.5 % PLATELET 427 258 - 327 K/uL MPV 11.1 9.2 - 11.8 FL  
 NEUTROPHILS 65 40 - 73 % LYMPHOCYTES 23 21 - 52 % MONOCYTES 6 3 - 10 % EOSINOPHILS 5 0 - 5 % BASOPHILS 1 0 - 2 %  
 ABS. NEUTROPHILS 5.0 1.8 - 8.0 K/UL  
 ABS. LYMPHOCYTES 1.8 0.9 - 3.6 K/UL  
 ABS. MONOCYTES 0.4 0.05 - 1.2 K/UL  
 ABS. EOSINOPHILS 0.4 0.0 - 0.4 K/UL  
 ABS. BASOPHILS 0.0 0.0 - 0.1 K/UL  
 DF AUTOMATED METABOLIC PANEL, COMPREHENSIVE Result Value Ref Range Sodium 135 (L) 136 - 145 mmol/L Potassium 4.7 3.5 - 5.5 mmol/L Chloride 105 100 - 108 mmol/L  
 CO2 24 21 - 32 mmol/L Anion gap 6 3.0 - 18 mmol/L Glucose 111 (H) 74 - 99 mg/dL BUN 27 (H) 7.0 - 18 MG/DL Creatinine 1.50 (H) 0.6 - 1.3 MG/DL  
 BUN/Creatinine ratio 18 12 - 20 GFR est AA 56 (L) >60 ml/min/1.73m2 GFR est non-AA 46 (L) >60 ml/min/1.73m2 Calcium 8.8 8.5 - 10.1 MG/DL Bilirubin, total 0.5 0.2 - 1.0 MG/DL  
 ALT (SGPT) 20 16 - 61 U/L  
 AST (SGOT) 19 15 - 37 U/L Alk. phosphatase 58 45 - 117 U/L Protein, total 7.5 6.4 - 8.2 g/dL Albumin 4.0 3.4 - 5.0 g/dL Globulin 3.5 2.0 - 4.0 g/dL A-G Ratio 1.1 0.8 - 1.7 LIPID PANEL Result Value Ref Range LIPID PROFILE Cholesterol, total 215 (H) <200 MG/DL Triglyceride 168 (H) <150 MG/DL  
 HDL Cholesterol 48 40 - 60 MG/DL  
 LDL, calculated 133.4 (H) 0 - 100 MG/DL VLDL, calculated 33.6 MG/DL  
 CHOL/HDL Ratio 4.5 0 - 5.0 HEMOGLOBIN A1C WITH EAG Result Value Ref Range Hemoglobin A1c 6.8 (H) 4.2 - 5.6 % Est. average glucose 148 mg/dL MICROALBUMIN, UR, RAND W/ MICROALB/CREAT RATIO Result Value Ref Range Microalbumin,urine random 10.20 (H) 0 - 3.0 MG/DL Creatinine, urine 84.00 30 - 125 mg/dL Microalbumin/Creat ratio (mg/g creat) 121 (H) 0 - 30 mg/g PROTHROMBIN TIME + INR Result Value Ref Range Prothrombin time 24.8 (H) 11.5 - 15.2 sec INR 2.2 (H) 0.8 - 1.2 Assessment / Plan ICD-10-CM ICD-9-CM 1. Right leg pain M79.604 729.5 2. Phlebitis and thrombophlebitis of other deep vessels of unspecified lower extremity (HCC) I80.299 451.19 PROTHROMBIN TIME + INR 3. Type 2 diabetes with nephropathy (HCC) E11.21 250.40 CBC WITH AUTOMATED DIFF  
  011.62 METABOLIC PANEL, COMPREHENSIVE  
   HEMOGLOBIN A1C WITH EAG  
   MICROALBUMIN, UR, RAND W/ MICROALB/CREAT RATIO 4. Primary osteoarthritis of right knee M17.11 715.16 CBC WITH AUTOMATED DIFF  
   METABOLIC PANEL, COMPREHENSIVE 5. Mixed hyperlipidemia E78.2 272.2 CBC WITH AUTOMATED DIFF  
   METABOLIC PANEL, COMPREHENSIVE  
   LIPID PANEL 6. Chest pain, unspecified type R07.9 786.50 NUCLEAR CARDIAC STRESS TEST Stress Test 
Check Protime and adjust Coumadin Labs to further assess DM and associated problems 
he was advised to continue his maintenance medications Follow-up and Dispositions · Return in about 3 months (around 7/26/2019). I asked Aziza Barger. Raymond Christopher if he has any questions and I answered the questions. Gregoria Christopher states that he understands the treatment plan and agrees with the treatment plan THIS NOTE WAS CREATED WITH A VOICE ACTIVATED DICTATION SYSTEM.   IT MAY CONTAIN TRANSCRIPTION ERRORS

## 2019-04-27 LAB
CREAT UR-MCNC: 84 MG/DL (ref 30–125)
MICROALBUMIN UR-MCNC: 10.2 MG/DL (ref 0–3)
MICROALBUMIN/CREAT UR-RTO: 121 MG/G (ref 0–30)

## 2019-05-20 ENCOUNTER — CLINICAL SUPPORT (OUTPATIENT)
Dept: FAMILY MEDICINE CLINIC | Facility: CLINIC | Age: 74
End: 2019-05-20

## 2019-05-20 DIAGNOSIS — I80.299 PHLEBITIS AND THROMBOPHLEBITIS OF OTHER DEEP VESSELS OF UNSPECIFIED LOWER EXTREMITY (HCC): ICD-10-CM

## 2019-05-20 DIAGNOSIS — Z12.5 PROSTATE CANCER SCREENING: ICD-10-CM

## 2019-05-20 DIAGNOSIS — I10 ESSENTIAL HYPERTENSION WITH GOAL BLOOD PRESSURE LESS THAN 140/90: ICD-10-CM

## 2019-05-20 DIAGNOSIS — I80.299 PHLEBITIS AND THROMBOPHLEBITIS OF OTHER DEEP VESSELS OF UNSPECIFIED LOWER EXTREMITY (HCC): Primary | ICD-10-CM

## 2019-05-20 LAB
INR BLD: 2.6
PT POC: NORMAL SECONDS
VALID INTERNAL CONTROL?: YES

## 2019-05-20 NOTE — TELEPHONE ENCOUNTER
Last seen 04/26/19  Next aapt  09/20/19  Last filled  11/26/19    Requested Prescriptions     Pending Prescriptions Disp Refills    warfarin (COUMADIN) 2.5 mg tablet 30 Tab 5     Sig: TAKE 1 TABLET BY MOUTH ONCE DAILY    nadolol (CORGARD) 40 mg tablet 15 Tab 5     Sig: Take 0.5 Tabs by mouth daily.  metFORMIN (GLUCOPHAGE) 1,000 mg tablet 60 Tab 5     Sig: Take 1 Tab by mouth two (2) times daily (with meals).  glimepiride (AMARYL) 2 mg tablet 30 Tab 5     Sig: TAKE ONE TABLET BY MOUTH EVERY MORNING FOR DIABETES    dicyclomine (BENTYL) 10 mg capsule 120 Cap 5     Sig: Take 1 Cap by mouth four (4) times daily.  tamsulosin (FLOMAX) 0.4 mg capsule 30 Cap 5     Sig: Take 1 Cap by mouth daily.  hydroCHLOROthiazide (HYDRODIURIL) 25 mg tablet 30 Tab 5     Sig: Take 1 Tab by mouth daily.      Patient also needs Captopril

## 2019-05-20 NOTE — PROGRESS NOTES
Mr. Kesha Poole is here today for anticoagulation monitoring for the diagnosis of DVT. His INR goal is 2.0-3.0 and his current Coumadin dose is 2.5 mg daily. Today's findings include an INR of 2.6 (normal INR range 0.8-1.2) . Considering Mr. Escobedo's past history, todays findings, and per the coumadin policy/protocol, Mr. Rachele Chau was instructed to take Coumadin as follows,  Same dose. He was also instructed to schedule an appointment in 4 weeks prior to leaving for an INR check. A full discussion of the nature of anticoagulants has been carried out. A full discussion of the need for frequent and regular monitoring, precise dosage adjustment and compliance was stressed. Side effects of potential bleeding were discussed and Mr. Rachele Chau was instructed to call [phone] if there are any signs of abnormal bleeding. Mr. Rachele Chau was instructed to avoid any OTC items containing aspirin or ibuprofen and prior to starting any new OTC products to consult with his physician or pharmacist to ensure no drug interactions are present. Mr. Rachele Chau was instructed to avoid any major changes in his general diet and to avoid alcohol consumption. . 
 
Mr. Escobedo verbalized his understanding of all instructions and will call the office with any questions, concerns, or signs of abnormal bleeding or blood clot.

## 2019-05-21 RX ORDER — GLIMEPIRIDE 2 MG/1
TABLET ORAL
Qty: 30 TAB | Refills: 5 | Status: SHIPPED | OUTPATIENT
Start: 2019-05-21 | End: 2019-10-31 | Stop reason: SDUPTHER

## 2019-05-21 RX ORDER — WARFARIN 2.5 MG/1
TABLET ORAL
Qty: 30 TAB | Refills: 5 | Status: SHIPPED | OUTPATIENT
Start: 2019-05-21 | End: 2019-10-31 | Stop reason: SDUPTHER

## 2019-05-21 RX ORDER — TAMSULOSIN HYDROCHLORIDE 0.4 MG/1
0.4 CAPSULE ORAL DAILY
Qty: 30 CAP | Refills: 5 | Status: SHIPPED | OUTPATIENT
Start: 2019-05-21 | End: 2019-10-31 | Stop reason: SDUPTHER

## 2019-05-21 RX ORDER — NADOLOL 40 MG/1
20 TABLET ORAL DAILY
Qty: 15 TAB | Refills: 5 | Status: SHIPPED | OUTPATIENT
Start: 2019-05-21 | End: 2019-10-31 | Stop reason: SDUPTHER

## 2019-05-21 RX ORDER — DICYCLOMINE HYDROCHLORIDE 10 MG/1
10 CAPSULE ORAL 4 TIMES DAILY
Qty: 120 CAP | Refills: 5 | Status: SHIPPED | OUTPATIENT
Start: 2019-05-21 | End: 2019-10-31 | Stop reason: SDUPTHER

## 2019-05-21 RX ORDER — HYDROCHLOROTHIAZIDE 25 MG/1
25 TABLET ORAL DAILY
Qty: 30 TAB | Refills: 5 | Status: SHIPPED | OUTPATIENT
Start: 2019-05-21 | End: 2019-10-31 | Stop reason: SDUPTHER

## 2019-05-21 RX ORDER — METFORMIN HYDROCHLORIDE 1000 MG/1
1000 TABLET ORAL 2 TIMES DAILY WITH MEALS
Qty: 60 TAB | Refills: 5 | Status: SHIPPED | OUTPATIENT
Start: 2019-05-21 | End: 2019-10-31 | Stop reason: SDUPTHER

## 2019-05-23 RX ORDER — CAPTOPRIL 25 MG/1
TABLET ORAL
Qty: 60 TAB | Refills: 4 | Status: SHIPPED | OUTPATIENT
Start: 2019-05-23 | End: 2019-10-07 | Stop reason: SDUPTHER

## 2019-08-13 ENCOUNTER — HOSPITAL ENCOUNTER (OUTPATIENT)
Dept: LAB | Age: 74
Discharge: HOME | End: 2019-08-13
Payer: MEDICARE

## 2019-08-13 DIAGNOSIS — I80.299 PHLEBITIS AND THROMBOPHLEBITIS OF OTHER DEEP VESSELS OF UNSPECIFIED LOWER EXTREMITY (HCC): ICD-10-CM

## 2019-08-13 DIAGNOSIS — E11.21 TYPE 2 DIABETES WITH NEPHROPATHY (HCC): ICD-10-CM

## 2019-08-13 LAB
ANION GAP SERPL CALC-SCNC: 8 MMOL/L (ref 3–18)
BASOPHILS # BLD: 0 K/UL (ref 0–0.1)
BASOPHILS NFR BLD: 0 % (ref 0–2)
BUN SERPL-MCNC: 25 MG/DL (ref 7–18)
BUN/CREAT SERPL: 16 (ref 12–20)
CALCIUM SERPL-MCNC: 7.9 MG/DL (ref 8.5–10.1)
CHLORIDE SERPL-SCNC: 110 MMOL/L (ref 100–111)
CO2 SERPL-SCNC: 23 MMOL/L (ref 21–32)
CREAT SERPL-MCNC: 1.52 MG/DL (ref 0.6–1.3)
DIFFERENTIAL METHOD BLD: ABNORMAL
EOSINOPHIL # BLD: 0.3 K/UL (ref 0–0.4)
EOSINOPHIL NFR BLD: 4 % (ref 0–5)
ERYTHROCYTE [DISTWIDTH] IN BLOOD BY AUTOMATED COUNT: 14.2 % (ref 11.6–14.5)
GLUCOSE SERPL-MCNC: 178 MG/DL (ref 74–99)
HCT VFR BLD AUTO: 34.7 % (ref 36–48)
HGB BLD-MCNC: 10.9 G/DL (ref 13–16)
INR PPP: 1.7 (ref 0.8–1.2)
LYMPHOCYTES # BLD: 1.3 K/UL (ref 0.9–3.6)
LYMPHOCYTES NFR BLD: 19 % (ref 21–52)
MCH RBC QN AUTO: 31.1 PG (ref 24–34)
MCHC RBC AUTO-ENTMCNC: 31.4 G/DL (ref 31–37)
MCV RBC AUTO: 98.9 FL (ref 74–97)
MONOCYTES # BLD: 0.5 K/UL (ref 0.05–1.2)
MONOCYTES NFR BLD: 6 % (ref 3–10)
NEUTS SEG # BLD: 5.1 K/UL (ref 1.8–8)
NEUTS SEG NFR BLD: 71 % (ref 40–73)
PLATELET # BLD AUTO: 135 K/UL (ref 135–420)
PMV BLD AUTO: 11.5 FL (ref 9.2–11.8)
POTASSIUM SERPL-SCNC: 4.9 MMOL/L (ref 3.5–5.5)
PROTHROMBIN TIME: 19.9 SEC (ref 11.5–15.2)
RBC # BLD AUTO: 3.51 M/UL (ref 4.7–5.5)
SODIUM SERPL-SCNC: 141 MMOL/L (ref 136–145)
WBC # BLD AUTO: 7.2 K/UL (ref 4.6–13.2)

## 2019-08-13 PROCEDURE — 85610 PROTHROMBIN TIME: CPT

## 2019-08-13 PROCEDURE — 36415 COLL VENOUS BLD VENIPUNCTURE: CPT

## 2019-08-13 PROCEDURE — 80048 BASIC METABOLIC PNL TOTAL CA: CPT

## 2019-08-13 PROCEDURE — 85025 COMPLETE CBC W/AUTO DIFF WBC: CPT

## 2019-09-20 ENCOUNTER — OFFICE VISIT (OUTPATIENT)
Dept: FAMILY MEDICINE CLINIC | Facility: CLINIC | Age: 74
End: 2019-09-20

## 2019-09-20 VITALS
DIASTOLIC BLOOD PRESSURE: 76 MMHG | HEART RATE: 51 BPM | WEIGHT: 213.8 LBS | SYSTOLIC BLOOD PRESSURE: 126 MMHG | OXYGEN SATURATION: 95 % | TEMPERATURE: 97 F | BODY MASS INDEX: 35.58 KG/M2

## 2019-09-20 DIAGNOSIS — I80.02: ICD-10-CM

## 2019-09-20 DIAGNOSIS — E11.9 CONTROLLED TYPE 2 DIABETES MELLITUS WITHOUT COMPLICATION, WITHOUT LONG-TERM CURRENT USE OF INSULIN (HCC): Primary | ICD-10-CM

## 2019-09-20 DIAGNOSIS — I10 ESSENTIAL HYPERTENSION WITH GOAL BLOOD PRESSURE LESS THAN 140/90: ICD-10-CM

## 2019-09-20 RX ORDER — CLARITHROMYCIN 500 MG/1
TABLET, FILM COATED ORAL
Qty: 14 TAB | Refills: 0 | Status: SHIPPED | OUTPATIENT
Start: 2019-09-20

## 2019-09-20 NOTE — PROGRESS NOTES
Chief Complaint   Patient presents with    Follow-up    Sinus Pain     patient states he feels he may have a sinus infection after having a tooth removed about 4 weeks ago     1. Have you been to the ER, urgent care clinic since your last visit? Hospitalized since your last visit? No    2. Have you seen or consulted any other health care providers outside of the [address] [place] [address] since your last visit? Include any pap smears or colon screening.  No

## 2019-09-25 ENCOUNTER — CLINICAL SUPPORT (OUTPATIENT)
Dept: FAMILY MEDICINE CLINIC | Facility: CLINIC | Age: 74
End: 2019-09-25

## 2019-09-25 DIAGNOSIS — I80.299 PHLEBITIS AND THROMBOPHLEBITIS OF OTHER DEEP VESSELS OF UNSPECIFIED LOWER EXTREMITY (HCC): Primary | ICD-10-CM

## 2019-09-25 LAB
INR BLD: 1.9
PT POC: NORMAL
VALID INTERNAL CONTROL?: YES

## 2019-09-25 NOTE — PROGRESS NOTES
Azucena Berger is a 69 y.o. male who presents today for Anticoagulation monitoring. Indication: DVT  INR Goal: 2.0-3.0. Current dose:  Coumadin 2.5 mg daily. Missed Coumadin Doses:  None  Medication Changes:  no  Dietary Changes:  no    Symptoms: taking coumadin appropriately without any bleeding. Latest INRs:  Lab Results   Component Value Date/Time    INR 1.7 (H) 08/13/2019 10:55 AM    INR 2.2 (H) 04/26/2019 10:07 AM    INR 1.7 (H) 10/26/2018 08:45 AM    INR POC 1.9 09/25/2019 12:40 PM    INR POC 1.3 08/22/2019 08:32 AM    INR POC 2.4 07/25/2019 04:06 PM    Prothrombin time 19.9 (H) 08/13/2019 10:55 AM    Prothrombin time 24.8 (H) 04/26/2019 10:07 AM    Prothrombin time 19.7 (H) 10/26/2018 08:45 AM        New Coumadin dose:.current treatment plan is effective, no change in therapy. Next check to be scheduled for  3 weeks.

## 2019-09-27 NOTE — PROGRESS NOTES
The patient presents to the office today with the chief complaint of Type 2 diabetes mellitus    HPI    The patient remains on metformin and Amaryl for type 2 diabetes mellitus. Hemoglobin A1c in April of this year was 6.8. Blood sugar done August 13 (random blood sugar) was 178. The patient remains on captopril. And hydrochlorothiazide for hypertension. The blood pressure is doing well. The patient has chronic phlebitis of his legs. Patient remains on warfarin. The patient's pro times remain good. ROS  The patient has chronic lower extremity edema  Patient denies chest pain or shortness of breath    Allergies   Allergen Reactions    Ampicillin Hives     Became allergic 30 years ago.  Keflex [Cephalexin] Itching    Lonox [Diphenoxylate-Atropine] Shortness of Breath    Penicillin V Potassium Hives       Current Outpatient Medications   Medication Sig Dispense Refill    clarithromycin (BIAXIN) 500 mg tablet 1 tab twice per day with food (Skip Coumadin dose on Monday) 14 Tab 0    captopril (CAPOTEN) 25 mg tablet TAKE 1 TABLET BY MOUTH TWO TIMES A DAY 60 Tab 4    warfarin (COUMADIN) 2.5 mg tablet TAKE 1 TABLET BY MOUTH ONCE DAILY 30 Tab 5    nadolol (CORGARD) 40 mg tablet Take 0.5 Tabs by mouth daily. 15 Tab 5    metFORMIN (GLUCOPHAGE) 1,000 mg tablet Take 1 Tab by mouth two (2) times daily (with meals). 60 Tab 5    glimepiride (AMARYL) 2 mg tablet TAKE ONE TABLET BY MOUTH EVERY MORNING FOR DIABETES 30 Tab 5    dicyclomine (BENTYL) 10 mg capsule Take 1 Cap by mouth four (4) times daily. 120 Cap 5    tamsulosin (FLOMAX) 0.4 mg capsule Take 1 Cap by mouth daily. 30 Cap 5    hydroCHLOROthiazide (HYDRODIURIL) 25 mg tablet Take 1 Tab by mouth daily. 30 Tab 5    aspirin delayed-release 81 mg tablet Take  by mouth daily.          Past Medical History:   Diagnosis Date    Allergic rhinitis     Benign paroxysmal positional vertigo     Dermatophytosis     Hypertrophy (benign) of prostate     Phlebitis and thrombophlebitis of other deep vessels of unspecified lower extremity (HCC)     Type [facility] ([facility]        Past Surgical History:   Procedure Laterality Date    HX COLONOSCOPY      polyps       Social History     Socioeconomic History    Marital status: UNKNOWN     Spouse name: Not on file    Number of children: Not on file    Years of education: Not on file    Highest education level: Not on file   Occupational History    Not on file   Social Needs    Financial resource strain: Not on file    Food insecurity:     Worry: Not on file     Inability: Not on file    Transportation needs:     Medical: Not on file     Non-medical: Not on file   Tobacco Use    Smoking status: Former Smoker    Smokeless tobacco: Never Used   Substance and Sexual Activity    Alcohol use: No    Drug use: No    Sexual activity: Never   Lifestyle    Physical activity:     Days per week: Not on file     Minutes per session: Not on file    Stress: Not on file   Relationships    Social connections:     Talks on phone: Not on file     Gets together: Not on file     Attends Shinto service: Not on file     Active member of club or organization: Not on file     Attends meetings of clubs or organizations: Not on file     Relationship status: Not on file    Intimate partner violence:     Fear of current or ex partner: Not on file     Emotionally abused: Not on file     Physically abused: Not on file     Forced sexual activity: Not on file   Other Topics Concern    Not on file   Social History Narrative    Not on file       Patient does not have an advanced directive on file    Visit Vitals  /76   Pulse (!) 51   Temp 97 °F (36.1 °C)   Wt 213 lb 12.8 oz (97 kg)   SpO2 95%   BMI 35.58 kg/m²       Physical Exam  No Cervical Lymphadenopathy  No Supraclavicular Lymphadenopathy  Thyroid is Normal  Lungs are normal to percussion.   Clear to auscultation   Heart:  S1 S2 are normal, No gallops, No murmurs  No Carotid Bruits  Abdomen:  Normal Bowel Sounds. No tenderness. No masses. No Hepatomegaly or Splenomegaly  LE:  Strong Pedal Pulses. No Edema      BMI: The BMI is high. Patient was advised to limit calories to 2000 suze daily and carbohydrates to 80 g daily    Clinical Support on 08/22/2019   Component Date Value Ref Range Status    VALID INTERNAL CONTROL POC 08/22/2019 Yes   Final    INR POC 08/22/2019 1.3   Final   Hospital Outpatient Visit on 08/13/2019   Component Date Value Ref Range Status    WBC 08/13/2019 7.2  4.6 - 13.2 K/uL Final    RBC 08/13/2019 3.51* 4.70 - 5.50 M/uL Final    HGB 08/13/2019 10.9* 13.0 - 16.0 g/dL Final    HCT 08/13/2019 34.7* 36.0 - 48.0 % Final    MCV 08/13/2019 98.9* 74.0 - 97.0 FL Final    MCH 08/13/2019 31.1  24.0 - 34.0 PG Final    MCHC 08/13/2019 31.4  31.0 - 37.0 g/dL Final    RDW 08/13/2019 14.2  11.6 - 14.5 % Final    PLATELET 40/54/0487 159  135 - 420 K/uL Final    MPV 08/13/2019 11.5  9.2 - 11.8 FL Final    NEUTROPHILS 08/13/2019 71  40 - 73 % Final    LYMPHOCYTES 08/13/2019 19* 21 - 52 % Final    MONOCYTES 08/13/2019 6  3 - 10 % Final    EOSINOPHILS 08/13/2019 4  0 - 5 % Final    BASOPHILS 08/13/2019 0  0 - 2 % Final    ABS. NEUTROPHILS 08/13/2019 5.1  1.8 - 8.0 K/UL Final    ABS. LYMPHOCYTES 08/13/2019 1.3  0.9 - 3.6 K/UL Final    ABS. MONOCYTES 08/13/2019 0.5  0.05 - 1.2 K/UL Final    ABS. EOSINOPHILS 08/13/2019 0.3  0.0 - 0.4 K/UL Final    ABS.  BASOPHILS 08/13/2019 0.0  0.0 - 0.1 K/UL Final    DF 08/13/2019 AUTOMATED    Final    Sodium 08/13/2019 141  136 - 145 mmol/L Final    Potassium 08/13/2019 4.9  3.5 - 5.5 mmol/L Final    Chloride 08/13/2019 110  100 - 111 mmol/L Final    PLEASE NOTE NEW REFERENCE RANGE    CO2 08/13/2019 23  21 - 32 mmol/L Final    Anion gap 08/13/2019 8  3.0 - 18 mmol/L Final    Glucose 08/13/2019 178* 74 - 99 mg/dL Final    BUN 08/13/2019 25* 7.0 - 18 MG/DL Final    Creatinine 08/13/2019 1.52* 0.6 - 1.3 MG/DL Final  BUN/Creatinine ratio 08/13/2019 16  12 - 20   Final    GFR est AA 08/13/2019 55* >60 ml/min/1.73m2 Final    GFR est non-AA 08/13/2019 45* >60 ml/min/1.73m2 Final    Comment: (NOTE)  Estimated GFR is calculated using the Modification of Diet in Renal   Disease (MDRD) Study equation, reported for both  Americans   (GFRAA) and non- Americans (GFRNA), and normalized to 1.73m2   body surface area. The physician must decide which value applies to   the patient. The MDRD study equation should only be used in   individuals age 25 or older. It has not been validated for the   following: pregnant women, patients with serious comorbid conditions,   or on certain medications, or persons with extremes of body size,   muscle mass, or nutritional status.  Calcium 08/13/2019 7.9* 8.5 - 10.1 MG/DL Final    Prothrombin time 08/13/2019 19.9* 11.5 - 15.2 sec Final    INR 08/13/2019 1.7* 0.8 - 1.2   Final    Comment:            INR Therapeutic Ranges         (on stable oral anticoagulant):     INDICATION                INR  DVT/PE/Atrial Fib          2.0-3.0  MI/Mechanical Heart Valve  2.5-3.5     Clinical Support on 07/25/2019   Component Date Value Ref Range Status    VALID INTERNAL CONTROL POC 07/25/2019 No   Final    INR POC 07/25/2019 2.4   Final       .No results found for any visits on 09/20/19. Assessment / Plan      ICD-10-CM ICD-9-CM    1. Controlled type 2 diabetes mellitus without complication, without long-term current use of insulin ([facility]) E11.9 250.00    2. Essential hypertension with goal blood pressure less than 140/90 I10 401.9    3. Chronic phlebitis of superficial vein of left lower extremity I80.02 451.0        he was advised to continue his maintenance medications      Follow-up and Dispositions    · Return in about 3 months (around 12/20/2019). I asked Kanchan Portillo. Magda Singleton. if he has any questions and I answered the questions. Marcella Singleton. states that he understands the treatment plan and agrees with the treatment plan          THIS DOCUMENT WAS CREATED WITH A VOICE ACTIVATED DICTATION SYSTEM.   IT MAY CONTAIN TRANSCRIPTION ERRORS

## 2019-10-07 RX ORDER — CAPTOPRIL 25 MG/1
TABLET ORAL
Qty: 60 TAB | Refills: 3 | Status: SHIPPED | OUTPATIENT
Start: 2019-10-07 | End: 2020-01-27 | Stop reason: SDUPTHER

## 2019-10-31 ENCOUNTER — CLINICAL SUPPORT (OUTPATIENT)
Dept: FAMILY MEDICINE CLINIC | Facility: CLINIC | Age: 74
End: 2019-10-31

## 2019-10-31 DIAGNOSIS — I80.299 PHLEBITIS AND THROMBOPHLEBITIS OF OTHER DEEP VESSELS OF UNSPECIFIED LOWER EXTREMITY (HCC): ICD-10-CM

## 2019-10-31 DIAGNOSIS — Z12.5 PROSTATE CANCER SCREENING: ICD-10-CM

## 2019-10-31 DIAGNOSIS — I10 ESSENTIAL HYPERTENSION WITH GOAL BLOOD PRESSURE LESS THAN 140/90: ICD-10-CM

## 2019-10-31 DIAGNOSIS — I80.299 PHLEBITIS OF DORSALIS PEDIS VEIN, UNSPECIFIED LATERALITY (HCC): Primary | ICD-10-CM

## 2019-10-31 LAB
INR BLD: 2.3
PT POC: NORMAL
VALID INTERNAL CONTROL?: YES

## 2019-10-31 RX ORDER — METFORMIN HYDROCHLORIDE 1000 MG/1
1000 TABLET ORAL 2 TIMES DAILY WITH MEALS
Qty: 60 TAB | Refills: 5 | Status: SHIPPED | OUTPATIENT
Start: 2019-10-31

## 2019-10-31 RX ORDER — DICYCLOMINE HYDROCHLORIDE 10 MG/1
10 CAPSULE ORAL 4 TIMES DAILY
Qty: 120 CAP | Refills: 5 | Status: SHIPPED | OUTPATIENT
Start: 2019-10-31

## 2019-10-31 RX ORDER — TAMSULOSIN HYDROCHLORIDE 0.4 MG/1
0.4 CAPSULE ORAL DAILY
Qty: 30 CAP | Refills: 5 | Status: SHIPPED | OUTPATIENT
Start: 2019-10-31

## 2019-10-31 RX ORDER — GLIMEPIRIDE 2 MG/1
TABLET ORAL
Qty: 30 TAB | Refills: 5 | Status: SHIPPED | OUTPATIENT
Start: 2019-10-31

## 2019-10-31 RX ORDER — NADOLOL 40 MG/1
20 TABLET ORAL DAILY
Qty: 15 TAB | Refills: 5 | Status: SHIPPED | OUTPATIENT
Start: 2019-10-31

## 2019-10-31 RX ORDER — HYDROCHLOROTHIAZIDE 25 MG/1
25 TABLET ORAL DAILY
Qty: 30 TAB | Refills: 5 | Status: SHIPPED | OUTPATIENT
Start: 2019-10-31

## 2019-10-31 RX ORDER — WARFARIN 2.5 MG/1
TABLET ORAL
Qty: 30 TAB | Refills: 5 | Status: SHIPPED | OUTPATIENT
Start: 2019-10-31

## 2019-10-31 NOTE — PROGRESS NOTES
Chief Complaint   Patient presents with    Anticoagulation       Jeevan Green. presents today for Anticoagulation monitoring. Indication: DVT and PE  INR Goal: 2.0-3.0. Current dosing:  Coumadin 2.5mg daily. Since last INR check, has the patient. .. Missed Coumadin doses?:  None  Had any medication changes (like antibiotics, PRN meds, etc)? :  no  Made dietary changes:  no    Symptoms: taking coumadin appropriately without any bleeding. Latest INRs:  Lab Results   Component Value Date/Time    INR 1.7 (H) 08/13/2019 10:55 AM    INR 2.2 (H) 04/26/2019 10:07 AM    INR 1.7 (H) 10/26/2018 08:45 AM    INR POC 2.3 10/31/2019 02:06 PM    INR POC 1.9 09/25/2019 12:40 PM    INR POC 1.3 08/22/2019 08:32 AM    Prothrombin time 19.9 (H) 08/13/2019 10:55 AM    Prothrombin time 24.8 (H) 04/26/2019 10:07 AM    Prothrombin time 19.7 (H) 10/26/2018 08:45 AM         Discussed with Robe Wakefield  as the provider in today's visit. New Coumadin dose:.current treatment plan is effective, no change in therapy. Next check to be scheduled for  3 weeks. *Plan of care reviewed with patient. Patient in agreement with plan and expresses understanding. All questions answered and patient encouraged to call or RTO if further questions or concerns.

## 2022-03-19 PROBLEM — E78.2 MIXED HYPERLIPIDEMIA: Status: ACTIVE | Noted: 2018-04-20

## 2022-03-19 PROBLEM — E66.01 SEVERE OBESITY (HCC): Status: ACTIVE | Noted: 2018-10-26

## 2022-03-19 PROBLEM — E11.21 TYPE 2 DIABETES WITH NEPHROPATHY (HCC): Status: ACTIVE | Noted: 2018-10-26

## 2023-06-11 NOTE — TELEPHONE ENCOUNTER
Last seen 09/20/19  Next appt  None    Requested Prescriptions     Pending Prescriptions Disp Refills    warfarin (COUMADIN) 2.5 mg tablet 30 Tab 5     Sig: TAKE 1 TABLET BY MOUTH ONCE DAILY    hydroCHLOROthiazide (HYDRODIURIL) 25 mg tablet 30 Tab 5     Sig: Take 1 Tab by mouth daily.  tamsulosin (FLOMAX) 0.4 mg capsule 30 Cap 5     Sig: Take 1 Cap by mouth daily.  nadolol (CORGARD) 40 mg tablet 15 Tab 5     Sig: Take 0.5 Tabs by mouth daily.  dicyclomine (BENTYL) 10 mg capsule 120 Cap 5     Sig: Take 1 Cap by mouth four (4) times daily.  metFORMIN (GLUCOPHAGE) 1,000 mg tablet 60 Tab 5     Sig: Take 1 Tab by mouth two (2) times daily (with meals).     glimepiride (AMARYL) 2 mg tablet 30 Tab 5     Sig: TAKE ONE TABLET BY MOUTH EVERY MORNING FOR DIABETES
Male

## 2023-08-24 ENCOUNTER — TRANSCRIBE ORDERS (OUTPATIENT)
Facility: HOSPITAL | Age: 78
End: 2023-08-24

## 2023-08-24 DIAGNOSIS — N18.6 END STAGE CHRONIC KIDNEY DISEASE (HCC): Primary | ICD-10-CM

## 2023-09-01 ENCOUNTER — HOSPITAL ENCOUNTER (OUTPATIENT)
Facility: HOSPITAL | Age: 78
Discharge: HOME | End: 2023-09-01
Payer: MEDICARE

## 2023-09-01 DIAGNOSIS — N18.6 END STAGE CHRONIC KIDNEY DISEASE (HCC): ICD-10-CM

## 2023-09-01 PROCEDURE — 76770 US EXAM ABDO BACK WALL COMP: CPT

## 2023-12-27 ENCOUNTER — APPOINTMENT (OUTPATIENT)
Facility: HOSPITAL | Age: 78
DRG: 638 | End: 2023-12-27
Attending: STUDENT IN AN ORGANIZED HEALTH CARE EDUCATION/TRAINING PROGRAM
Payer: MEDICARE

## 2023-12-27 ENCOUNTER — APPOINTMENT (OUTPATIENT)
Facility: HOSPITAL | Age: 78
DRG: 638 | End: 2023-12-27
Payer: MEDICARE

## 2023-12-27 ENCOUNTER — HOSPITAL ENCOUNTER (INPATIENT)
Facility: HOSPITAL | Age: 78
LOS: 7 days | Discharge: HOME HEALTH | DRG: 638 | End: 2024-01-03
Attending: STUDENT IN AN ORGANIZED HEALTH CARE EDUCATION/TRAINING PROGRAM | Admitting: STUDENT IN AN ORGANIZED HEALTH CARE EDUCATION/TRAINING PROGRAM
Payer: MEDICARE

## 2023-12-27 DIAGNOSIS — L03.115 CELLULITIS OF RIGHT LOWER EXTREMITY: Primary | ICD-10-CM

## 2023-12-27 DIAGNOSIS — R33.9 URINARY RETENTION: ICD-10-CM

## 2023-12-27 DIAGNOSIS — I82.4Y1 DEEP VEIN THROMBOSIS (DVT) OF PROXIMAL VEIN OF RIGHT LOWER EXTREMITY, UNSPECIFIED CHRONICITY (HCC): ICD-10-CM

## 2023-12-27 DIAGNOSIS — N17.9 AKI (ACUTE KIDNEY INJURY) (HCC): ICD-10-CM

## 2023-12-27 DIAGNOSIS — R79.1 SUPRATHERAPEUTIC INR: ICD-10-CM

## 2023-12-27 DIAGNOSIS — E11.42 DIABETIC POLYNEUROPATHY ASSOCIATED WITH TYPE 2 DIABETES MELLITUS (HCC): ICD-10-CM

## 2023-12-27 DIAGNOSIS — I50.9 ACUTE ON CHRONIC CONGESTIVE HEART FAILURE, UNSPECIFIED HEART FAILURE TYPE (HCC): ICD-10-CM

## 2023-12-27 PROBLEM — L03.90 CELLULITIS: Status: ACTIVE | Noted: 2023-12-27

## 2023-12-27 LAB
ALBUMIN SERPL-MCNC: 3.3 G/DL (ref 3.4–5)
ALBUMIN/GLOB SERPL: 0.8 (ref 0.8–1.7)
ALP SERPL-CCNC: 125 U/L (ref 45–117)
ALT SERPL-CCNC: 126 U/L (ref 16–61)
ANION GAP SERPL CALC-SCNC: 8 MMOL/L (ref 3–18)
APPEARANCE UR: CLEAR
AST SERPL-CCNC: 69 U/L (ref 10–38)
BACTERIA URNS QL MICRO: NEGATIVE /HPF
BASOPHILS # BLD: 0 K/UL (ref 0–0.1)
BASOPHILS NFR BLD: 0 % (ref 0–2)
BILIRUB SERPL-MCNC: 0.8 MG/DL (ref 0.2–1)
BILIRUB UR QL: NEGATIVE
BUN SERPL-MCNC: 71 MG/DL (ref 7–18)
BUN/CREAT SERPL: 26 (ref 12–20)
CALCIUM SERPL-MCNC: 8.7 MG/DL (ref 8.5–10.1)
CHLORIDE SERPL-SCNC: 103 MMOL/L (ref 100–111)
CO2 SERPL-SCNC: 26 MMOL/L (ref 21–32)
COLOR UR: YELLOW
CREAT SERPL-MCNC: 2.73 MG/DL (ref 0.6–1.3)
D DIMER PPP FEU-MCNC: 0.39 UG/ML(FEU)
DIFFERENTIAL METHOD BLD: ABNORMAL
EKG ATRIAL RATE: 64 BPM
EKG DIAGNOSIS: NORMAL
EKG P AXIS: 71 DEGREES
EKG P-R INTERVAL: 152 MS
EKG Q-T INTERVAL: 468 MS
EKG QRS DURATION: 150 MS
EKG QTC CALCULATION (BAZETT): 482 MS
EKG R AXIS: -66 DEGREES
EKG T AXIS: 45 DEGREES
EKG VENTRICULAR RATE: 64 BPM
EOSINOPHIL # BLD: 0 K/UL (ref 0–0.4)
EOSINOPHIL NFR BLD: 0 % (ref 0–5)
EPITH CASTS URNS QL MICRO: NORMAL /LPF (ref 0–5)
ERYTHROCYTE [DISTWIDTH] IN BLOOD BY AUTOMATED COUNT: 13.2 % (ref 11.6–14.5)
GLOBULIN SER CALC-MCNC: 4.3 G/DL (ref 2–4)
GLUCOSE SERPL-MCNC: 198 MG/DL (ref 74–99)
GLUCOSE UR STRIP.AUTO-MCNC: NEGATIVE MG/DL
HCT VFR BLD AUTO: 34.9 % (ref 36–48)
HGB BLD-MCNC: 11.7 G/DL (ref 13–16)
HGB UR QL STRIP: ABNORMAL
IMM GRANULOCYTES # BLD AUTO: 0.1 K/UL (ref 0–0.04)
IMM GRANULOCYTES NFR BLD AUTO: 1 % (ref 0–0.5)
INR PPP: >9.2 (ref 0.9–1.1)
KETONES UR QL STRIP.AUTO: NEGATIVE MG/DL
LACTATE BLD-SCNC: 0.79 MMOL/L (ref 0.4–2)
LEUKOCYTE ESTERASE UR QL STRIP.AUTO: NEGATIVE
LIPASE SERPL-CCNC: 51 U/L (ref 13–75)
LYMPHOCYTES # BLD: 1.3 K/UL (ref 0.9–3.6)
LYMPHOCYTES NFR BLD: 14 % (ref 21–52)
MAGNESIUM SERPL-MCNC: 1.6 MG/DL (ref 1.6–2.6)
MCH RBC QN AUTO: 32.2 PG (ref 24–34)
MCHC RBC AUTO-ENTMCNC: 33.5 G/DL (ref 31–37)
MCV RBC AUTO: 96.1 FL (ref 78–100)
MONOCYTES # BLD: 0.6 K/UL (ref 0.05–1.2)
MONOCYTES NFR BLD: 7 % (ref 3–10)
NEUTS SEG # BLD: 7.3 K/UL (ref 1.8–8)
NEUTS SEG NFR BLD: 78 % (ref 40–73)
NITRITE UR QL STRIP.AUTO: NEGATIVE
NRBC # BLD: 0 K/UL (ref 0–0.01)
NRBC BLD-RTO: 0 PER 100 WBC
NT PRO BNP: 6407 PG/ML (ref 0–1800)
PH UR STRIP: 5 (ref 5–8)
PLATELET # BLD AUTO: 247 K/UL (ref 135–420)
PMV BLD AUTO: 11.3 FL (ref 9.2–11.8)
POTASSIUM SERPL-SCNC: 3.6 MMOL/L (ref 3.5–5.5)
PROT SERPL-MCNC: 7.6 G/DL (ref 6.4–8.2)
PROT UR STRIP-MCNC: ABNORMAL MG/DL
PROTHROMBIN TIME: >75 SEC (ref 11.9–14.7)
RBC # BLD AUTO: 3.63 M/UL (ref 4.35–5.65)
RBC #/AREA URNS HPF: NORMAL /HPF (ref 0–5)
SODIUM SERPL-SCNC: 137 MMOL/L (ref 136–145)
SP GR UR REFRACTOMETRY: 1.01 (ref 1–1.03)
TROPONIN I SERPL HS-MCNC: 19 NG/L (ref 0–78)
UROBILINOGEN UR QL STRIP.AUTO: 1 EU/DL (ref 0.2–1)
WBC # BLD AUTO: 9.4 K/UL (ref 4.6–13.2)
WBC URNS QL MICRO: NEGATIVE /HPF (ref 0–4)

## 2023-12-27 PROCEDURE — 71045 X-RAY EXAM CHEST 1 VIEW: CPT

## 2023-12-27 PROCEDURE — 1100000000 HC RM PRIVATE

## 2023-12-27 PROCEDURE — 83605 ASSAY OF LACTIC ACID: CPT

## 2023-12-27 PROCEDURE — 73700 CT LOWER EXTREMITY W/O DYE: CPT

## 2023-12-27 PROCEDURE — 85379 FIBRIN DEGRADATION QUANT: CPT

## 2023-12-27 PROCEDURE — 93005 ELECTROCARDIOGRAM TRACING: CPT | Performed by: STUDENT IN AN ORGANIZED HEALTH CARE EDUCATION/TRAINING PROGRAM

## 2023-12-27 PROCEDURE — P9041 ALBUMIN (HUMAN),5%, 50ML: HCPCS | Performed by: STUDENT IN AN ORGANIZED HEALTH CARE EDUCATION/TRAINING PROGRAM

## 2023-12-27 PROCEDURE — 6360000002 HC RX W HCPCS: Performed by: STUDENT IN AN ORGANIZED HEALTH CARE EDUCATION/TRAINING PROGRAM

## 2023-12-27 PROCEDURE — 83880 ASSAY OF NATRIURETIC PEPTIDE: CPT

## 2023-12-27 PROCEDURE — 6370000000 HC RX 637 (ALT 250 FOR IP): Performed by: STUDENT IN AN ORGANIZED HEALTH CARE EDUCATION/TRAINING PROGRAM

## 2023-12-27 PROCEDURE — 85025 COMPLETE CBC W/AUTO DIFF WBC: CPT

## 2023-12-27 PROCEDURE — 93971 EXTREMITY STUDY: CPT

## 2023-12-27 PROCEDURE — 96375 TX/PRO/DX INJ NEW DRUG ADDON: CPT

## 2023-12-27 PROCEDURE — 83735 ASSAY OF MAGNESIUM: CPT

## 2023-12-27 PROCEDURE — 2580000003 HC RX 258: Performed by: STUDENT IN AN ORGANIZED HEALTH CARE EDUCATION/TRAINING PROGRAM

## 2023-12-27 PROCEDURE — 99285 EMERGENCY DEPT VISIT HI MDM: CPT

## 2023-12-27 PROCEDURE — 80053 COMPREHEN METABOLIC PANEL: CPT

## 2023-12-27 PROCEDURE — 74176 CT ABD & PELVIS W/O CONTRAST: CPT

## 2023-12-27 PROCEDURE — 96374 THER/PROPH/DIAG INJ IV PUSH: CPT

## 2023-12-27 PROCEDURE — 85610 PROTHROMBIN TIME: CPT

## 2023-12-27 PROCEDURE — 93010 ELECTROCARDIOGRAM REPORT: CPT | Performed by: INTERNAL MEDICINE

## 2023-12-27 PROCEDURE — 81001 URINALYSIS AUTO W/SCOPE: CPT

## 2023-12-27 PROCEDURE — 87040 BLOOD CULTURE FOR BACTERIA: CPT

## 2023-12-27 PROCEDURE — 84484 ASSAY OF TROPONIN QUANT: CPT

## 2023-12-27 PROCEDURE — 83690 ASSAY OF LIPASE: CPT

## 2023-12-27 RX ORDER — ACETAMINOPHEN 500 MG
1000 TABLET ORAL
Status: COMPLETED | OUTPATIENT
Start: 2023-12-28 | End: 2023-12-28

## 2023-12-27 RX ORDER — CAPTOPRIL 12.5 MG/1
25 TABLET ORAL 2 TIMES DAILY
Status: DISCONTINUED | OUTPATIENT
Start: 2023-12-28 | End: 2023-12-28

## 2023-12-27 RX ORDER — HYDROCHLOROTHIAZIDE 25 MG/1
25 TABLET ORAL DAILY
Status: DISCONTINUED | OUTPATIENT
Start: 2023-12-28 | End: 2023-12-28

## 2023-12-27 RX ORDER — FUROSEMIDE 10 MG/ML
20 INJECTION INTRAMUSCULAR; INTRAVENOUS
Status: COMPLETED | OUTPATIENT
Start: 2023-12-27 | End: 2023-12-27

## 2023-12-27 RX ORDER — TAMSULOSIN HYDROCHLORIDE 0.4 MG/1
0.4 CAPSULE ORAL DAILY
Status: DISCONTINUED | OUTPATIENT
Start: 2023-12-28 | End: 2024-01-03 | Stop reason: HOSPADM

## 2023-12-27 RX ORDER — NADOLOL 20 MG/1
20 TABLET ORAL DAILY
Status: DISCONTINUED | OUTPATIENT
Start: 2023-12-28 | End: 2024-01-03 | Stop reason: HOSPADM

## 2023-12-27 RX ORDER — ALBUMIN, HUMAN INJ 5% 5 %
25 SOLUTION INTRAVENOUS ONCE
Status: COMPLETED | OUTPATIENT
Start: 2023-12-27 | End: 2023-12-27

## 2023-12-27 RX ADMIN — ALBUMIN (HUMAN) 25 G: 12.5 INJECTION, SOLUTION INTRAVENOUS at 19:11

## 2023-12-27 RX ADMIN — VANCOMYCIN HYDROCHLORIDE 750 MG: 750 INJECTION, POWDER, LYOPHILIZED, FOR SOLUTION INTRAVENOUS at 21:46

## 2023-12-27 RX ADMIN — PHYTONADIONE 10 MG: 10 INJECTION, EMULSION INTRAMUSCULAR; INTRAVENOUS; SUBCUTANEOUS at 20:24

## 2023-12-27 RX ADMIN — FUROSEMIDE 20 MG: 10 INJECTION, SOLUTION INTRAMUSCULAR; INTRAVENOUS at 20:21

## 2023-12-27 RX ADMIN — VANCOMYCIN HYDROCHLORIDE 750 MG: 750 INJECTION, POWDER, LYOPHILIZED, FOR SOLUTION INTRAVENOUS at 19:10

## 2023-12-27 NOTE — ED TRIAGE NOTES
Pt ambulatory to room c/o R leg swelling that \"burst\" and now has purulent drainage and \"slight\" hematuria x 1 day. Pt reports hx of diabetes and blood clots in R leg

## 2023-12-28 LAB
CHP ED QC CHECK: NORMAL
ECHO BSA: 2.04 M2
EKG ATRIAL RATE: 234 BPM
EKG DIAGNOSIS: NORMAL
EKG P AXIS: 102 DEGREES
EKG Q-T INTERVAL: 468 MS
EKG QRS DURATION: 146 MS
EKG QTC CALCULATION (BAZETT): 482 MS
EKG R AXIS: -64 DEGREES
EKG T AXIS: 44 DEGREES
EKG VENTRICULAR RATE: 64 BPM
EST. AVERAGE GLUCOSE BLD GHB EST-MCNC: 134 MG/DL
GLUCOSE BLD STRIP.AUTO-MCNC: 134 MG/DL (ref 70–110)
GLUCOSE BLD STRIP.AUTO-MCNC: 182 MG/DL (ref 70–110)
GLUCOSE BLD STRIP.AUTO-MCNC: 199 MG/DL (ref 70–110)
GLUCOSE BLD STRIP.AUTO-MCNC: 234 MG/DL (ref 70–110)
GLUCOSE BLD-MCNC: 134 MG/DL
HBA1C MFR BLD: 6.3 % (ref 4.2–5.6)

## 2023-12-28 PROCEDURE — 1100000000 HC RM PRIVATE

## 2023-12-28 PROCEDURE — 83036 HEMOGLOBIN GLYCOSYLATED A1C: CPT

## 2023-12-28 PROCEDURE — 6370000000 HC RX 637 (ALT 250 FOR IP): Performed by: HOSPITALIST

## 2023-12-28 PROCEDURE — 6360000002 HC RX W HCPCS: Performed by: HOSPITALIST

## 2023-12-28 PROCEDURE — 36415 COLL VENOUS BLD VENIPUNCTURE: CPT

## 2023-12-28 PROCEDURE — 94761 N-INVAS EAR/PLS OXIMETRY MLT: CPT

## 2023-12-28 PROCEDURE — 93971 EXTREMITY STUDY: CPT | Performed by: SURGERY

## 2023-12-28 PROCEDURE — 82962 GLUCOSE BLOOD TEST: CPT

## 2023-12-28 PROCEDURE — 87070 CULTURE OTHR SPECIMN AEROBIC: CPT

## 2023-12-28 PROCEDURE — 87186 SC STD MICRODIL/AGAR DIL: CPT

## 2023-12-28 PROCEDURE — 87205 SMEAR GRAM STAIN: CPT

## 2023-12-28 PROCEDURE — 87077 CULTURE AEROBIC IDENTIFY: CPT

## 2023-12-28 PROCEDURE — 99223 1ST HOSP IP/OBS HIGH 75: CPT | Performed by: HOSPITALIST

## 2023-12-28 PROCEDURE — 6370000000 HC RX 637 (ALT 250 FOR IP): Performed by: EMERGENCY MEDICINE

## 2023-12-28 PROCEDURE — 93010 ELECTROCARDIOGRAM REPORT: CPT | Performed by: INTERNAL MEDICINE

## 2023-12-28 RX ORDER — INSULIN LISPRO 100 [IU]/ML
0-4 INJECTION, SOLUTION INTRAVENOUS; SUBCUTANEOUS NIGHTLY
Status: DISCONTINUED | OUTPATIENT
Start: 2023-12-28 | End: 2024-01-03 | Stop reason: HOSPADM

## 2023-12-28 RX ORDER — INSULIN LISPRO 100 [IU]/ML
0-4 INJECTION, SOLUTION INTRAVENOUS; SUBCUTANEOUS
Status: DISCONTINUED | OUTPATIENT
Start: 2023-12-28 | End: 2024-01-03 | Stop reason: HOSPADM

## 2023-12-28 RX ORDER — POLYETHYLENE GLYCOL 3350 17 G/17G
17 POWDER, FOR SOLUTION ORAL DAILY PRN
Status: DISCONTINUED | OUTPATIENT
Start: 2023-12-28 | End: 2024-01-03 | Stop reason: HOSPADM

## 2023-12-28 RX ORDER — LEVOFLOXACIN 5 MG/ML
750 INJECTION, SOLUTION INTRAVENOUS
Status: DISCONTINUED | OUTPATIENT
Start: 2023-12-28 | End: 2023-12-29

## 2023-12-28 RX ORDER — ONDANSETRON 4 MG/1
4 TABLET, ORALLY DISINTEGRATING ORAL EVERY 8 HOURS PRN
Status: DISCONTINUED | OUTPATIENT
Start: 2023-12-28 | End: 2024-01-03 | Stop reason: HOSPADM

## 2023-12-28 RX ORDER — ACETAMINOPHEN 325 MG/1
650 TABLET ORAL EVERY 6 HOURS PRN
Status: DISCONTINUED | OUTPATIENT
Start: 2023-12-28 | End: 2024-01-03 | Stop reason: HOSPADM

## 2023-12-28 RX ORDER — ACETAMINOPHEN 650 MG/1
650 SUPPOSITORY RECTAL EVERY 6 HOURS PRN
Status: DISCONTINUED | OUTPATIENT
Start: 2023-12-28 | End: 2024-01-03 | Stop reason: HOSPADM

## 2023-12-28 RX ORDER — ONDANSETRON 2 MG/ML
4 INJECTION INTRAMUSCULAR; INTRAVENOUS EVERY 6 HOURS PRN
Status: DISCONTINUED | OUTPATIENT
Start: 2023-12-28 | End: 2024-01-03 | Stop reason: HOSPADM

## 2023-12-28 RX ORDER — DEXTROSE MONOHYDRATE 100 MG/ML
INJECTION, SOLUTION INTRAVENOUS CONTINUOUS PRN
Status: DISCONTINUED | OUTPATIENT
Start: 2023-12-28 | End: 2024-01-03 | Stop reason: HOSPADM

## 2023-12-28 RX ADMIN — INSULIN LISPRO 1 UNITS: 100 INJECTION, SOLUTION INTRAVENOUS; SUBCUTANEOUS at 17:19

## 2023-12-28 RX ADMIN — LEVOFLOXACIN 750 MG: 5 INJECTION, SOLUTION INTRAVENOUS at 06:47

## 2023-12-28 RX ADMIN — ACETAMINOPHEN 325MG 650 MG: 325 TABLET ORAL at 23:28

## 2023-12-28 RX ADMIN — ACETAMINOPHEN 1000 MG: 500 TABLET ORAL at 00:04

## 2023-12-28 ASSESSMENT — PAIN SCALES - GENERAL
PAINLEVEL_OUTOF10: 0
PAINLEVEL_OUTOF10: 6
PAINLEVEL_OUTOF10: 8
PAINLEVEL_OUTOF10: 0
PAINLEVEL_OUTOF10: 6
PAINLEVEL_OUTOF10: 0

## 2023-12-28 ASSESSMENT — PAIN DESCRIPTION - LOCATION: LOCATION: LEG

## 2023-12-28 ASSESSMENT — PAIN DESCRIPTION - DESCRIPTORS: DESCRIPTORS: ACHING

## 2023-12-28 ASSESSMENT — LIFESTYLE VARIABLES
HOW MANY STANDARD DRINKS CONTAINING ALCOHOL DO YOU HAVE ON A TYPICAL DAY: PATIENT DOES NOT DRINK
HOW OFTEN DO YOU HAVE A DRINK CONTAINING ALCOHOL: NEVER

## 2023-12-28 ASSESSMENT — PAIN DESCRIPTION - ORIENTATION: ORIENTATION: POSTERIOR

## 2023-12-28 NOTE — ACP (ADVANCE CARE PLANNING)
Advance Care Planning     Advance Care Planning Inpatient Note  [facility] Department    Today's Date: 12/28/2023  Unit: [facility]    Received request from .  Upon review of chart and communication with care team, patient's decision making abilities are not in question.. Patient was/were present in the room during visit.    Goals of ACP Conversation:  Discuss advance care planning documents    Health Care Decision Makers:     No healthcare decision makers have been documented.  Click here to complete HealthCare Decision Makers including selection of the Healthcare Decision Maker Relationship (ie \"Primary\")  Summary:  No Decision Maker named by patient at this time    Advance Care Planning Documents (Patient Wishes):  None     Assessment:  Patient seen in bed 14 of the emergency room where he will be admitted to the hospital  from due to acute cellulitis. Patient was asked about his having an advance directive and he said no.    Interventions:  Patient DECLINED ACP conversation    Care Preferences Communicated:   No    Outcomes/Plan:      Electronically signed by Cristopher Avendaño Jr., [facility] on 12/28/2023 at 10:50 AM

## 2023-12-28 NOTE — ED PROVIDER NOTES
[facility] EMERGENCY DEPT  EMERGENCY DEPARTMENT ENCOUNTER      Pt Name: Sonido Capellan  MRN: [id number]  Birthdate 1945  Date of evaluation: 12/27/2023  Provider: Bobbi Goldberg MD    CHIEF COMPLAINT       Chief Complaint   Patient presents with    Leg Swelling    leg drainage         HISTORY OF PRESENT ILLNESS   (Location/Symptom, Timing/Onset, Context/Setting, Quality, Duration, Modifying Factors, Severity)  Note limiting factors.   Sonido Capellan is a 78 y.o. male who presents to the emergency department for treatment of his swollen leg.  He states that it was twice the size a couple days ago but since he was unable to make it into the hospital due to having to care for his sick dog he was treating his outpatient with his doctor who started him on antibiotics.  He states that he feels like it is improving and the swelling has gone down.  He has been taking his warfarin.  He states that the leg does not really hurt is just quite swollen and has changed color since last week.  He is also noticed some drainage from it he states his leg is constantly wet.  He also notes that today he noticed some blood in his urine but denies any dysuria, hematuria increased any frequency.  Denies abdominal pain.  Denies any nausea or vomiting.  He has been having intermittent chest pain that comes and goes.  Not sure makes it better or worse.     Nursing Notes were reviewed.    REVIEW OF SYSTEMS    (2-9 systems for level 4, 10 or more for level 5)     Constitutional: No fever  HENT: No ear pain  Eyes: No change in vision  Respiratory: No SOB  Cardio: Positive for chest pain  GI: No blood in stool  : Positive for hematuria  MSK: No back pain  Skin: Positive for skin changes  Neuro: No headache    Except as noted above the remainder of the review of systems was reviewed and negative.       PAST MEDICAL HISTORY     Past Medical History:   Diagnosis Date    Allergic rhinitis     Benign paroxysmal positional vertigo     Dermatophytosis        URINALYSIS, MICRO   LIPASE   PROTIME-INR   POCT LACTIC ACID (LACTATE)       All other labs were within normal range or not returned as of this dictation.    EMERGENCY DEPARTMENT COURSE and DIFFERENTIAL DIAGNOSIS/MDM:   Vitals:    Vitals:    12/27/23 1521 12/27/23 1945   BP: (!) 149/52    Pulse: 62 72   Resp: 17 17   Temp: 98.4 °F (36.9 °C)    TempSrc: Oral    SpO2: 96% 100%   Weight: 90.7 kg (200 lb)    Height: 1.651 m (5' 5\")        Medical Decision Making  Amount and/or Complexity of Data Reviewed  Labs: ordered.  Radiology: ordered.  ECG/medicine tests: ordered.    Risk  Prescription drug management.  Decision regarding hospitalization.      Patient is a 78-year-old male presenting with changes to his right lower extremity.  Overall his leg looks terrible and is obviously notably swollen with significant skin changes compared to the other side.  It is warm and it is draining fluid.  Overall highly concerning for infection.  He states outpatient antibiotics have been helping but is obviously here.  Will send off cultures and labs.  However with his history of DVT I would like to obtain a duplex of his lower extremity.  He also complains of hematuria and I would like to check his INR levels well as per signs of infection in his urine.  Will also look for signs of heart failure as he has edema noted to both lower extremities.    INR is noted to be markedly elevated at greater than 9.2.  Patient be given vitamin K as he does not have any active bleeding at this time.  CBC shows a normocytic anemia.  CMP shows an elevated BUN and creatinine consistent with acute kidney injury and elevated LFTs of unclear significance.  Patient currently asymptomatic from the elevated LFTs.  BNP is markedly elevated at 6407.  Patient be given albumin and Lasix to help treat this.  Lactic acid within normal limits.  Troponin within normal limits.    Urine shows small amount of blood but no significant infection.    CT of the patient's

## 2023-12-28 NOTE — PROGRESS NOTES
[facility] Renal Dosing Services      Previous Regimen Levofloxacin 500 mg IV Q24hr x 7 days   Serum Creatinine No results found for: \"MEL\", \"CREA\", \"CREAPOC\"   Creatinine Clearance Estimated Creatinine Clearance: 24 mL/min (A) (based on SCr of 2.73 mg/dL (H)).   BUN Lab Results   Component Value Date/Time    BUN 71 12/27/2023 03:55 PM           The following medication: Levofloxacin was automatically dose-adjusted per [facility] P&T Committee Protocol, with respect to renal function.      Dosage changed to:  750 mg IV Q48hr for Tx of SSTI x 7 days    Additional notes:    Pharmacy to continue to monitor patient daily.   Will make dosage adjustments based upon changing renal function.  Signed YAYA CLIFFORD RPH.

## 2023-12-28 NOTE — PROGRESS NOTES
[facility] Hospitalist Group  Progress Note    Patient: Sonido Capellan Age: 78 y.o. : 1945 MR#: [id number] SSN: [id number]      Subjective/24-hour events:     Patient admitted early this AM by night coverage for RLE cellulitis.  Also w/DEV on underlying CKD.    Assessment:   RLE cellulitis  DEV on CKD 4  DM 2  Coagulopathy secondary to coumadin  Elevated LFTs  Class 1 obesity    Plan:   Continue ABX as initaited in ED.    ID evaluation today.  Nephrology recs noted/appreciated.  Consider vascular eval.  Will follow up again formally in AM.      Objective:   VS: BP (!) 124/54   Pulse 65   Temp 98.6 °F (37 °C) (Oral)   Resp 20   Ht 1.651 m (5' 5\")   Wt 90.7 kg (200 lb)   SpO2 94%   BMI 33.28 kg/m²      Tmax/24hrs: Temp (24hrs), Av.3 °F (36.8 °C), Min:98 °F (36.7 °C), Max:98.6 °F (37 °C)    Intake/Output Summary (Last 24 hours) at 2023 1005  Last data filed at 2023 0939  Gross per 24 hour   Intake --   Output 4000 ml   Net -4000 ml       Current Facility-Administered Medications   Medication Dose Route Frequency    ondansetron (ZOFRAN-ODT) disintegrating tablet 4 mg  4 mg Oral Q8H PRN    Or    ondansetron (ZOFRAN) injection 4 mg  4 mg IntraVENous Q6H PRN    polyethylene glycol (GLYCOLAX) packet 17 g  17 g Oral Daily PRN    acetaminophen (TYLENOL) tablet 650 mg  650 mg Oral Q6H PRN    Or    acetaminophen (TYLENOL) suppository 650 mg  650 mg Rectal Q6H PRN    levoFLOXacin (LEVAQUIN) 750 MG/150ML infusion 750 mg  750 mg IntraVENous Q48H    glucose chewable tablet 16 g  4 tablet Oral PRN    dextrose bolus 10% 125 mL  125 mL IntraVENous PRN    Or    dextrose bolus 10% 250 mL  250 mL IntraVENous PRN    glucagon injection 1 mg  1 mg SubCUTAneous PRN    dextrose 10 % infusion   IntraVENous Continuous PRN    insulin lispro (HUMALOG) injection vial 0-4 Units  0-4 Units SubCUTAneous TID WC    insulin lispro (HUMALOG) injection vial 0-4 Units  0-4 Units SubCUTAneous Nightly     Calculation (Bazett) 482 ms    P Axis 71 degrees    R Axis -66 degrees    T Axis 45 degrees    Diagnosis       Normal sinus rhythm  Right bundle branch block  Left anterior fascicular block  Bifascicular block  Left ventricular hypertrophy with repolarization abnormality  Abnormal ECG  No previous ECGs available  Confirmed by MD Yimi, Simone ([id number]) on 12/27/2023 4:41:47 PM     EKG 12 Lead    Collection Time: 12/27/23  4:33 PM   Result Value Ref Range    Ventricular Rate 64 BPM    Atrial Rate 234 BPM    QRS Duration 146 ms    Q-T Interval 468 ms    QTc Calculation (Bazett) 482 ms    P Axis 102 degrees    R Axis -64 degrees    T Axis 44 degrees    Diagnosis       Atrial flutter with variable AV block with premature ventricular or   aberrantly conducted complexes  Right bundle branch block  Left anterior fascicular block  Bifascicular block  Moderate voltage criteria for LVH, may be normal variant  Abnormal ECG  When compared with ECG of 27-DEC-2023 16:32,  Atrial flutter has replaced Sinus rhythm     Vascular duplex lower extremity venous right    Collection Time: 12/27/23  5:50 PM   Result Value Ref Range    Body Surface Area 2.04 m2   Blood Culture 2    Collection Time: 12/27/23  7:15 PM    Specimen: Blood   Result Value Ref Range    Special Requests NO SPECIAL REQUESTS      Culture NO GROWTH AFTER 1 HOUR     POCT Glucose    Collection Time: 12/28/23  8:10 AM   Result Value Ref Range    POC Glucose 134 (H) 70 - 110 mg/dL   POCT glucose    Collection Time: 12/28/23  8:11 AM   Result Value Ref Range    Glucose 134 mg/dL    QC OK? ok          Signed By: Julien Wen MD

## 2023-12-28 NOTE — ED NOTES
Per Dr. Zaman, send this patient to [facility] ED. Called transport, 60-90 minutes

## 2023-12-28 NOTE — PROGRESS NOTES
completed the initial Spiritual Assessment of the patient, and offered Pastoral Care support to the patient in bed 14 of the emergency room where he is having breakfast.  Patient will be admitted to the hospital with acute cellulitis. There is no advance directive. Patient does not have any Bahai/cultural needs that will affect patient’s preferences in health care. Chaplains will continue to follow and will provide pastoral care on an as needed/requested basis.    diamante Avendaño  Board Certified   Spiritual Care Department  [phone]

## 2023-12-28 NOTE — CONSULTS
(placeholder)   Other RX Placeholder    nadolol (CORGARD) tablet 20 mg  20 mg Oral Daily    tamsulosin (FLOMAX) capsule 0.4 mg  0.4 mg Oral Daily       Allergies: Ampicillin, Diphenoxylate-atropine, Cephalexin, and Penicillin v potassium    Family History   Problem Relation Age of Onset    Cancer Mother     Heart Disease Father      Social History     Socioeconomic History    Marital status: Single     Spouse name: Not on file    Number of children: Not on file    Years of education: Not on file    Highest education level: Not on file   Occupational History    Not on file   Tobacco Use    Smoking status: Former    Smokeless tobacco: Never   Substance and Sexual Activity    Alcohol use: No    Drug use: No    Sexual activity: Not on file   Other Topics Concern    Not on file   Social History Narrative    Not on file     Social Determinants of Health     Financial Resource Strain: Not on file   Food Insecurity: No Food Insecurity (2023)    Hunger Vital Sign     Worried About Running Out of Food in the Last Year: Never true     Ran Out of Food in the Last Year: Never true   Transportation Needs: No Transportation Needs (2023)    PRAPARE - Transportation     Lack of Transportation (Medical): No     Lack of Transportation (Non-Medical): No   Physical Activity: Not on file   Stress: Not on file   Social Connections: Not on file   Intimate Partner Violence: Not on file   Housing Stability: Low Risk  (2023)    Housing Stability Vital Sign     Unable to Pay for Housing in the Last Year: No     Number of Places Lived in the Last Year: 1     Unstable Housing in the Last Year: No     Social History     Tobacco Use   Smoking Status Former   Smokeless Tobacco Never        Temp (24hrs), Av.3 °F (36.8 °C), Min:98 °F (36.7 °C), Max:98.6 °F (37 °C)    BP (!) 124/54   Pulse 65   Temp 98.6 °F (37 °C) (Oral)   Resp 20   Ht 1.651 m (5' 5\")   Wt 90.7 kg (200 lb)   SpO2 94%   BMI 33.28 kg/m²     ROS: 12 point  ROS obtained in details. Pertinent positives as mentioned in HPI,   otherwise negative    Physical Exam:    Gen: In general, this is a well nourished male in no acute distress  HEENT: Sclerae nonicteric. Oral mucous membranes moist. Dentition normal  Neck: Supple with midline trachea.   CV: RRR without murmur or rub appreciated.   Resp:Respirations are unlabored without use of accessory muscles. Lung fields B/L without wheezes or rhonchi.   Abd: Soft, nontender, nondistended.  Extrem: Darkish erythema right leg with superficial purulent blisters-no significant overlying warmth/tenderness, + palpable pedal pulse  Skin: Warm, no visible rashes.  Neuro: Patient is alert, oriented, and cooperative. No obvious focal defects. Moves all 4 extremities        Labs: Results:   Chemistry Recent Labs     12/27/23 1555 12/28/23 0811   GLUCOSE 198* 134     --    K 3.6  --      --    CO2 26  --    BUN 71*  --    CREATININE 2.73*  --    GLOB 4.3*  --    *  --    AST 69*  --       CBC w/Diff Recent Labs     12/27/23 1555   WBC 9.4   RBC 3.63*   HGB 11.7*   HCT 34.9*         Microbiology Results       Procedure Component Value Units Date/Time    Blood Culture 2 [[id number]] Collected: 12/27/23 1915    Order Status: Completed Specimen: Blood Updated: 12/28/23 0733     Special Requests NO SPECIAL REQUESTS        Culture NO GROWTH AFTER 1 HOUR       Blood Culture 1 [[id number]] Collected: 12/27/23 1555    Order Status: Completed Specimen: Blood Updated: 12/28/23 0733     Special Requests NO SPECIAL REQUESTS        Culture NO GROWTH AFTER 8 HOURS                   RADIOLOGY:    All available imaging studies/reports in [facility] for this admission were reviewed      Disclaimer: Sections of this note are dictated utilizing voice recognition software, which may have resulted in some phonetic based errors in grammar and contents. Even though attempts were made to correct all the mistakes, some may have been

## 2023-12-28 NOTE — ED NOTES
Assumed care of patient. Introduced myself to patient. Pt safety measures established. Pt. Connected to cardiac monitor, bed locked in lowest position, call bell within reach

## 2023-12-28 NOTE — ED NOTES
Assumed care of patient from KAROL Kwon and KAROL Oscar. Patient laying in bed in position of comfort.

## 2023-12-28 NOTE — CONSULTS
Consult Note      Consult requested by: Julien Wen MD    ADMIT DATE: 12/27/2023    CONSULT DATE: December 28, 2023                 Admission diagnosis: Cellulitis   Reason for Nephrology Consultation: DEV      Assessment and plan:    #1 acute kidney injury on CKD 4, versus progression of underlying CKD.  Baseline difficult to determine as his last creatinine in epic was 1.5 in 2019, now presents with a creatinine of 2.7.  He has longstanding history of diabetes with complication with history of microalbuminuria and his etiology is likely diabetic nephropathy.  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 intake unless n.p.o. for the procedure in which case may need gentle fluids  #4 avoid hypotension, allow systolic blood pressures of 130s to 150s in acute setting  #5 management of hypercoagulopathy per primary team  #6 renally dose medications for EGFR of less than 30  #7 monitor electrolytes and renal functions daily    Discussed plan with primary team    Please call with questions    Amador Clay MD [facility]  Cell [phone]  Pager: [phone]  Fax   [phone]      HPI: Sonido Capellan is a 78 y.o. 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   HEENT: Head is normocephalic and atraumatic.   Neck: no cervical lymphadenopathy or thyromegaly.   Lungs: good air entry, clear to auscultation bilaterally.   Cardiovascular system: S1, S2, regular rate and rhythm.  Abdomen: soft, non tender, non distended.   Extremities: rt LE edema , open wound with drainage , Left LE no edema   Integumentary: skin is grossly intact.   Neurologic: Alert, oriented time three.     Data Review:    Labs: Results:       Chemistry Recent Labs     12/27/23  1555      K 3.6      CO2 26   BUN 71*   GLOB 4.3*         CBC w/Diff Recent Labs     12/27/23  1555   WBC 9.4   RBC 3.63*   HGB 11.7*   HCT 34.9*            Iron/Ferritin No results for input(s): \"IRON\" in the last 72 hours.    Invalid input(s): \"TIBCCALC\"   PTH/VIT D No results for input(s): \"PTH\" in the last 72 hours.    Invalid input(s): \"VITD\"           Amador Clay MD  12/28/2023  5:14 PM      December 28, 2023

## 2023-12-28 NOTE — PROGRESS NOTES
[facility]   Pharmacy Pharmacokinetic Monitoring Service - Vancomycin    Indication: SSTI  Goal level: 10-20 mg/L  Day of Therapy: 2  Additional Antimicrobials: LVX    Pertinent Laboratory Values:   Wt Readings from Last 1 Encounters:   12/27/23 90.7 kg (200 lb)     Temp Readings from Last 1 Encounters:   12/28/23 98 °F (36.7 °C) (Oral)     Estimated Creatinine Clearance: 24 mL/min (A) (based on SCr of 2.73 mg/dL (H)).    Recent Labs     12/27/23  1555   CREATININE 2.73*   BUN 71*   WBC 9.4     Pertinent Cultures:  Date Source Results   12/27 blood pending   MRSA Nasal Swab: NA    Assessment:  Date Current Dose Level (mg/L) Timing of Level (h)   12/27 750 mg x2 - -   12/28 - - -     Plan:  Dose by level  No dose today  Ordered a level for 12/29 with AM labs  Pharmacy will continue to monitor patient and adjust therapy as indicated    Thank you for the consult,  Job Cantrell RPH  12/28/2023

## 2023-12-28 NOTE — H&P
HISTORY & PHYSICAL      Patient: Sonido Capellan MRN: [id number]  CSN: [id number]    YOB: 1945  Age: 78 y.o.  Sex: male    DOA: 12/27/2023 LOS:  LOS: 1 day        DOA: 12/27/2023        Assessment/Plan     Principal Problem:    Cellulitis  Resolved Problems:    * No resolved hospital problems. *      Patient Active Problem List   Diagnosis    Dermatophytosis    Diabetes mellitus type 2, controlled (HCC)    Severe obesity (HCC)    Mixed hyperlipidemia    Benign paroxysmal positional vertigo    Type 2 diabetes with nephropathy (HCC)    Phlebitis and thrombophlebitis of other deep vessels of unspecified lower extremity (HCC)    Allergic rhinitis    Cellulitis         Plan:  Cellulitis right leg-continue broad-spectrum IV antibiotics, ID consulted, local wound care.  Ultrasound right leg shows age-indeterminate nonocclusive mobile deep vein thrombosis of the right popliteal vein as well as right posterior tibial vein.  H/o hypertension-continue nadolol, hold HCTZ, continue to monitor blood pressure.  DEV on CKD 4-patient received IV Lasix at the emergency room, will hold off IV fluids for now, nephrology consultation.  History of type 2 diabetes-insulin sliding scale, monitor blood sugars  Coagulopathy with INR 9.2 secondary to warfarin-patient received vitamin K, hold further anticoagulation.  Elevated BNP, patient is not complaining of shortness of breath.  Elevated LFTs-will continue to monitor and will need further evaluation as outpatient  DVT prophylaxis-coagulopathy  Full code          History of Presenting Illness:    78-year-old male with a known history of hypertension, type 2 diabetes, CKD 3 presents to the emergency room secondary to right leg cellulitis.  Patient mentions that this has been ongoing for the past few weeks and he saw his PCP who put him on 2 antibiotics but his right leg has not significantly improved.  He was advised to come to the emergency room however did not make it because    Result Value Ref Range    Lipase 51 13 - 75 U/L   POCT lactic acid (lactate)    Collection Time: 12/27/23  3:56 PM   Result Value Ref Range    POC Lactic Acid 0.79 0.40 - 2.00 mmol/L   Urinalysis    Collection Time: 12/27/23  4:25 PM   Result Value Ref Range    Color, UA YELLOW      Appearance CLEAR      Specific Gravity, UA 1.013 1.005 - 1.030      pH, Urine 5.0 5.0 - 8.0      Protein, UA TRACE (A) NEG mg/dL    Glucose, UA Negative NEG mg/dL    Ketones, Urine Negative NEG mg/dL    Bilirubin Urine Negative NEG      Blood, Urine SMALL (A) NEG      Urobilinogen, Urine 1.0 0.2 - 1.0 EU/dL    Nitrite, Urine Negative NEG      Leukocyte Esterase, Urine Negative NEG     Urinalysis, Micro    Collection Time: 12/27/23  4:25 PM   Result Value Ref Range    WBC, UA Negative 0 - 4 /hpf    RBC, UA 0 to 3 0 - 5 /hpf    Epithelial Cells UA FEW 0 - 5 /lpf    BACTERIA, URINE Negative NEG /hpf   EKG 12 Lead    Collection Time: 12/27/23  4:32 PM   Result Value Ref Range    Ventricular Rate 64 BPM    Atrial Rate 64 BPM    P-R Interval 152 ms    QRS Duration 150 ms    Q-T Interval 468 ms    QTc Calculation (Bazett) 482 ms    P Axis 71 degrees    R Axis -66 degrees    T Axis 45 degrees    Diagnosis       Normal sinus rhythm  Right bundle branch block  Left anterior fascicular block  Bifascicular block  Left ventricular hypertrophy with repolarization abnormality  Abnormal ECG  No previous ECGs available  Confirmed by MD Yimi, Simone ([id number]) on 12/27/2023 4:41:47 PM     Vascular duplex lower extremity venous right    Collection Time: 12/27/23  5:50 PM   Result Value Ref Range    Body Surface Area 2.04 m2       Imaging Reviewed:  CT ABDOMEN PELVIS WO CONTRAST Additional Contrast? None    Result Date: 12/27/2023  1. No CT evidence of acute intra-abdominal or intrapelvic process. No nephrolithiasis or obstructive uropathy. 2. Cholelithiasis. 3. 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 4. There is a single punctate calcification

## 2023-12-28 NOTE — PLAN OF CARE
Problem: Chronic Conditions and Co-morbidities  Goal: Patient's chronic conditions and co-morbidity symptoms are monitored and maintained or improved  Outcome: Progressing  Flowsheets (Taken 12/28/2023 1602)  Care Plan - Patient's Chronic Conditions and Co-Morbidity Symptoms are Monitored and Maintained or Improved: Monitor and assess patient's chronic conditions and comorbid symptoms for stability, deterioration, or improvement     Problem: Safety - Adult  Goal: Free from fall injury  Outcome: Progressing  Flowsheets (Taken 12/28/2023 1602)  Free From Fall Injury: Based on caregiver fall risk screen, instruct family/caregiver to ask for assistance with transferring infant if caregiver noted to have fall risk factors     Problem: ABCDS Injury Assessment  Goal: Absence of physical injury  Outcome: Progressing  Flowsheets (Taken 12/28/2023 1602)  Absence of Physical Injury: Implement safety measures based on patient assessment     Problem: Skin/Tissue Integrity  Goal: Absence of new skin breakdown  Description: 1.  Monitor for areas of redness and/or skin breakdown  2.  Assess vascular access sites hourly  3.  Every 4-6 hours minimum:  Change oxygen saturation probe site  4.  Every 4-6 hours:  If on nasal continuous positive airway pressure, respiratory therapy assess nares and determine need for appliance change or resting period.  Outcome: Progressing     Problem: Pain  Goal: Verbalizes/displays adequate comfort level or baseline comfort level  Outcome: Progressing  Flowsheets (Taken 12/28/2023 1602)  Verbalizes/displays adequate comfort level or baseline comfort level:   Encourage patient to monitor pain and request assistance   Assess pain using appropriate pain scale

## 2023-12-28 NOTE — CONSULTS
Room 460: pt not seen at this time.  Pt has wound photo appreciated on chart.  Pt to be seen by ID as well.  Wound care orders right lower leg: Unit nursing staff to apply:   Wound spray,   Adaptic (Oil Emulsion Dressing), ABD pads, kerlix wrap,   Change every day & prn soilage.  Perfect served Dr. Wen re: follow up for edema of leg as outpatient.   Will turn over care to unit nursing staff at this time & sign off.   Grisel TOMLINSONN, RN, CWOCN, CFCN

## 2023-12-28 NOTE — H&P
Consultation noted , seeing shortly    Please call with questions    Amador Clay MD [facility]  Cell [phone]  Pager: [phone]  Fax   [phone]

## 2023-12-28 NOTE — ED NOTES
Assume care of pt. Pt lower right leg is red with drainage, swollen, and painful. Pt reports \"lightening shocks that travel down the leg\".  Pulses present but weak.  Pt lower left leg has some swelling with pulses present also weak. Patient alert and awake.

## 2023-12-29 LAB
ANION GAP SERPL CALC-SCNC: 6 MMOL/L (ref 3–18)
BASOPHILS # BLD: 0 K/UL (ref 0–0.1)
BASOPHILS NFR BLD: 0 % (ref 0–2)
BUN SERPL-MCNC: 64 MG/DL (ref 7–18)
BUN/CREAT SERPL: 29 (ref 12–20)
CALCIUM SERPL-MCNC: 8.1 MG/DL (ref 8.5–10.1)
CHLORIDE SERPL-SCNC: 108 MMOL/L (ref 100–111)
CO2 SERPL-SCNC: 25 MMOL/L (ref 21–32)
CREAT SERPL-MCNC: 2.22 MG/DL (ref 0.6–1.3)
DIFFERENTIAL METHOD BLD: ABNORMAL
EOSINOPHIL # BLD: 0 K/UL (ref 0–0.4)
EOSINOPHIL NFR BLD: 0 % (ref 0–5)
ERYTHROCYTE [DISTWIDTH] IN BLOOD BY AUTOMATED COUNT: 13.2 % (ref 11.6–14.5)
GLUCOSE BLD STRIP.AUTO-MCNC: 119 MG/DL (ref 70–110)
GLUCOSE BLD STRIP.AUTO-MCNC: 198 MG/DL (ref 70–110)
GLUCOSE BLD STRIP.AUTO-MCNC: 268 MG/DL (ref 70–110)
GLUCOSE BLD STRIP.AUTO-MCNC: 271 MG/DL (ref 70–110)
GLUCOSE SERPL-MCNC: 149 MG/DL (ref 74–99)
HCT VFR BLD AUTO: 29.4 % (ref 36–48)
HGB BLD-MCNC: 10 G/DL (ref 13–16)
IMM GRANULOCYTES # BLD AUTO: 0.1 K/UL (ref 0–0.04)
IMM GRANULOCYTES NFR BLD AUTO: 1 % (ref 0–0.5)
INR PPP: 1.6 (ref 0.9–1.1)
LYMPHOCYTES # BLD: 1.3 K/UL (ref 0.9–3.6)
LYMPHOCYTES NFR BLD: 13 % (ref 21–52)
MAGNESIUM SERPL-MCNC: 1.5 MG/DL (ref 1.6–2.6)
MCH RBC QN AUTO: 32.8 PG (ref 24–34)
MCHC RBC AUTO-ENTMCNC: 34 G/DL (ref 31–37)
MCV RBC AUTO: 96.4 FL (ref 78–100)
MONOCYTES # BLD: 0.9 K/UL (ref 0.05–1.2)
MONOCYTES NFR BLD: 9 % (ref 3–10)
NEUTS SEG # BLD: 7.6 K/UL (ref 1.8–8)
NEUTS SEG NFR BLD: 77 % (ref 40–73)
NRBC # BLD: 0 K/UL (ref 0–0.01)
NRBC BLD-RTO: 0 PER 100 WBC
PLATELET # BLD AUTO: 211 K/UL (ref 135–420)
PMV BLD AUTO: 11.4 FL (ref 9.2–11.8)
POTASSIUM SERPL-SCNC: 3.4 MMOL/L (ref 3.5–5.5)
PROTHROMBIN TIME: 18.7 SEC (ref 11.9–14.7)
RBC # BLD AUTO: 3.05 M/UL (ref 4.35–5.65)
SODIUM SERPL-SCNC: 139 MMOL/L (ref 136–145)
VANCOMYCIN SERPL-MCNC: 10 UG/ML (ref 5–40)
WBC # BLD AUTO: 10 K/UL (ref 4.6–13.2)

## 2023-12-29 PROCEDURE — 80202 ASSAY OF VANCOMYCIN: CPT

## 2023-12-29 PROCEDURE — 82962 GLUCOSE BLOOD TEST: CPT

## 2023-12-29 PROCEDURE — 6360000002 HC RX W HCPCS: Performed by: INTERNAL MEDICINE

## 2023-12-29 PROCEDURE — 83735 ASSAY OF MAGNESIUM: CPT

## 2023-12-29 PROCEDURE — 85025 COMPLETE CBC W/AUTO DIFF WBC: CPT

## 2023-12-29 PROCEDURE — 36415 COLL VENOUS BLD VENIPUNCTURE: CPT

## 2023-12-29 PROCEDURE — 6370000000 HC RX 637 (ALT 250 FOR IP): Performed by: HOSPITALIST

## 2023-12-29 PROCEDURE — 6370000000 HC RX 637 (ALT 250 FOR IP): Performed by: FAMILY MEDICINE

## 2023-12-29 PROCEDURE — 80048 BASIC METABOLIC PNL TOTAL CA: CPT

## 2023-12-29 PROCEDURE — 85610 PROTHROMBIN TIME: CPT

## 2023-12-29 PROCEDURE — 1100000000 HC RM PRIVATE

## 2023-12-29 PROCEDURE — 94761 N-INVAS EAR/PLS OXIMETRY MLT: CPT

## 2023-12-29 PROCEDURE — 2580000003 HC RX 258: Performed by: INTERNAL MEDICINE

## 2023-12-29 PROCEDURE — 99232 SBSQ HOSP IP/OBS MODERATE 35: CPT | Performed by: FAMILY MEDICINE

## 2023-12-29 RX ORDER — OXYCODONE HYDROCHLORIDE AND ACETAMINOPHEN 5; 325 MG/1; MG/1
1 TABLET ORAL EVERY 6 HOURS PRN
Status: COMPLETED | OUTPATIENT
Start: 2023-12-29 | End: 2023-12-29

## 2023-12-29 RX ORDER — DIPHENHYDRAMINE HYDROCHLORIDE 50 MG/ML
25 INJECTION INTRAMUSCULAR; INTRAVENOUS EVERY 4 HOURS PRN
Status: DISCONTINUED | OUTPATIENT
Start: 2023-12-29 | End: 2024-01-03 | Stop reason: HOSPADM

## 2023-12-29 RX ORDER — GABAPENTIN 100 MG/1
100 CAPSULE ORAL 3 TIMES DAILY
Status: DISCONTINUED | OUTPATIENT
Start: 2023-12-29 | End: 2024-01-03 | Stop reason: HOSPADM

## 2023-12-29 RX ORDER — LANOLIN ALCOHOL/MO/W.PET/CERES
400 CREAM (GRAM) TOPICAL ONCE
Status: COMPLETED | OUTPATIENT
Start: 2023-12-29 | End: 2023-12-29

## 2023-12-29 RX ADMIN — CEFEPIME 1000 MG: 1 INJECTION, POWDER, FOR SOLUTION INTRAMUSCULAR; INTRAVENOUS at 12:42

## 2023-12-29 RX ADMIN — OXYCODONE AND ACETAMINOPHEN 1 TABLET: 5; 325 TABLET ORAL at 04:37

## 2023-12-29 RX ADMIN — INSULIN LISPRO 2 UNITS: 100 INJECTION, SOLUTION INTRAVENOUS; SUBCUTANEOUS at 12:43

## 2023-12-29 RX ADMIN — Medication 400 MG: at 13:11

## 2023-12-29 RX ADMIN — GABAPENTIN 100 MG: 100 CAPSULE ORAL at 13:11

## 2023-12-29 RX ADMIN — CEFEPIME 1000 MG: 1 INJECTION, POWDER, FOR SOLUTION INTRAMUSCULAR; INTRAVENOUS at 22:00

## 2023-12-29 RX ADMIN — TAMSULOSIN HYDROCHLORIDE 0.4 MG: 0.4 CAPSULE ORAL at 08:27

## 2023-12-29 RX ADMIN — GABAPENTIN 100 MG: 100 CAPSULE ORAL at 22:00

## 2023-12-29 RX ADMIN — ACETAMINOPHEN 325MG 650 MG: 325 TABLET ORAL at 22:00

## 2023-12-29 RX ADMIN — NADOLOL 20 MG: 20 TABLET ORAL at 08:27

## 2023-12-29 RX ADMIN — POTASSIUM BICARBONATE 50 MEQ: 978 TABLET, EFFERVESCENT ORAL at 13:11

## 2023-12-29 RX ADMIN — OXYCODONE AND ACETAMINOPHEN 1 TABLET: 5; 325 TABLET ORAL at 11:06

## 2023-12-29 ASSESSMENT — PAIN DESCRIPTION - DESCRIPTORS
DESCRIPTORS: ACHING;THROBBING;TINGLING
DESCRIPTORS: ACHING

## 2023-12-29 ASSESSMENT — PAIN SCALES - GENERAL
PAINLEVEL_OUTOF10: 3
PAINLEVEL_OUTOF10: 2
PAINLEVEL_OUTOF10: 8
PAINLEVEL_OUTOF10: 6
PAINLEVEL_OUTOF10: 4
PAINLEVEL_OUTOF10: 6

## 2023-12-29 ASSESSMENT — PAIN DESCRIPTION - ORIENTATION
ORIENTATION: RIGHT
ORIENTATION: RIGHT;LEFT

## 2023-12-29 ASSESSMENT — PAIN DESCRIPTION - LOCATION
LOCATION: BACK
LOCATION: LEG
LOCATION: LEG;FOOT

## 2023-12-29 NOTE — PROGRESS NOTES
In Patient Progress note      Admit Date: 12/27/2023    Impression:     #1 Acute kidney injury on CKD 4,  Baseline difficult to determine as his last creatinine in epic was 1.5 in 2019, ,longstanding history of diabetes with complication with history of microalbuminuria and his etiology of CKD is likely underlying diabetic nephropathy. 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 intake   #4 allow systolic blood pressures of 130s to 150s in acute setting  #5 management of elevated INR /hematuria  per primary team  #6 renally dose medications for EGFR of less than 30  #7 monitor electrolytes and renal functions daily  #8 replace k      Discussed plan with primary team     Please call with questions     Amador Clay MD [facility]  Cell [phone]  Pager: [phone]  Fax   [phone]       Subjective:     - No acute over night events.  - respiratory - stable  - hemodynamics - stable, no pressrs  - UOP-good  - Nutrition -ok    Objective:     BP (!) 121/53   Pulse 56   Temp 98 °F (36.7 °C) (Oral)   Resp 20   Ht 1.651 m (5' 5\")   Wt 90.7 kg (200 lb)   SpO2 95%   BMI 33.28 kg/m²       Intake/Output Summary (Last 24 hours) at 12/29/2023 1234  Last data filed at 12/29/2023 1155  Gross per 24 hour   Intake 960 ml   Output 2400 ml   Net -1440 ml       Physical Exam:     Patient is in no apparent distress.   HEENT: Head is normocephalic and atraumatic.   Neck: no cervical lymphadenopathy or thyromegaly.

## 2023-12-29 NOTE — PROGRESS NOTES
[facility]   Pharmacy Pharmacokinetic Monitoring Service - Vancomycin    Indication: Skin and Soft Tissue Infection  Target Concentration:  10-20 mg/L  Day of Therapy: 2  Additional Antimicrobials: Levaquin    Pertinent Laboratory Values:   Temp: 98.3 °F (36.8 °C), Weight - Scale: 90.7 kg (200 lb)  Recent Labs     12/27/23  1555 12/29/23  0109   CREATININE 2.73* 2.22*   BUN 71* 64*   WBC 9.4 10.0       Estimated Creatinine Clearance: 29 mL/min (A) (based on SCr of 2.22 mg/dL (H)).    Pertinent Cultures:  Culture Date Source Results   12/27 Blood NGTD   12/28 Wound 3+ GNR   MRSA Nasal Swab: N/A. Non-respiratory infection    Assessment:  Date/Time Current Dose Concentration Timing of Concentration (h) AUC   12/27 750 mg x 2 - - -   12/28 -      12/29 1500 mg  10      Note: Serum concentrations collected for AUC dosing may appear elevated if collected in close proximity to the dose administered, this is not necessarily an indication of toxicity    Plan:  Concentration-guided dosing due to renal impairment  1500 mg x 1 today  Renal labs as indicated   Vancomycin concentration ordered for AM labs tomorrow  Pharmacy will continue to monitor patient and adjust therapy as indicated    Thank you for the consult,  YAYA CLIFFORD [facility]  12/29/2023

## 2023-12-29 NOTE — PROGRESS NOTES
Comprehensive Nutrition Assessment    Type and Reason for Visit:  Initial, Positive Nutrition Screen    Nutrition Recommendations/Plan:   Add supplement: Glucerna Shake BID (220 kcal, 10 gm protein each)  Modify diet; add 4 CHO choice/ meal given pt hx of DM and having hyperglycemia  Replace Mg with 400 mg Mag-ox x 1 dose - discussed with MD  Randell K - MD addressing  Continue all other nutrition interventions. Encourage/ monitor po intake of meals and supplements.      Malnutrition Assessment:  Malnutrition Status:  At risk for malnutrition (Comment) (pt reported having decreased appetite and unplanned wt loss PTA.) (12/29/23 1233)    Context:  Acute Illness       Nutrition History and Allergies:   Past medical hx:  DM type 2, CKD stage 3, HTN.   Per chart hx, pt weighing 213 lb on 9/20/19; currently weighing 200 lb upon admission.  Wt loss of 13 lb, 6.1% x 4 years PTA, not significant.  Per nutrition screen, pt reported experiencing decreased po intake and unplanned wt loss PTA.  No known food allergies     Nutrition History and Allergies:   Past medical hx:  DM type 2, CKD stage 3, HTN.   Per chart hx, pt weighing 213 lb on 9/20/19; currently weighing 200 lb upon admission.  Wt loss of 13 lb, 6.1% x 4 years PTA, not significant.  Per nutrition screen, pt reported experiencing decreased po intake and unplanned wt loss PTA.  No known food allergies     Nutrition Assessment:    Pt unavailable at time of visit; admitted with cellulitis of right leg, Wound Care RN following; has hx of HTN; has DEV on CKD 4. Nephrology following. Pt on po diet; noted fair intake x1 meal. Per nutrition screen, pt reported experiencing decreased po intake and unplanned wt loss PTA. Plan to add nutrition supplement. Pt with low Mg level; reached out to MD regarding replacement. K low, but not pt with allergy to penicillin V potassium; not sure if will have interaction with potassium supplement; discussed with MD regarding pt to need  and Nutrient Intake, Supplement Intake, Vitamin/Mineral Intake  Physical Signs/Symptoms Outcomes: Biochemical Data, GI Status, Fluid Status or Edema, Meal Time Behavior, Weight, Skin    Discharge Planning:    Too soon to determine     Maureen Licona RD  Contact: [phone]

## 2023-12-29 NOTE — PLAN OF CARE
Problem: Chronic Conditions and Co-morbidities  Goal: Patient's chronic conditions and co-morbidity symptoms are monitored and maintained or improved  12/29/2023 0543 by Estelle Adame RN  Outcome: Progressing  Flowsheets (Taken 12/28/2023 2043)  Care Plan - Patient's Chronic Conditions and Co-Morbidity Symptoms are Monitored and Maintained or Improved: Monitor and assess patient's chronic conditions and comorbid symptoms for stability, deterioration, or improvement  12/28/2023 1602 by Juan Manuel Gilbert RN  Outcome: Progressing  Flowsheets  Taken 12/28/2023 1602  Care Plan - Patient's Chronic Conditions and Co-Morbidity Symptoms are Monitored and Maintained or Improved: Monitor and assess patient's chronic conditions and comorbid symptoms for stability, deterioration, or improvement  Taken 12/28/2023 0901  Care Plan - Patient's Chronic Conditions and Co-Morbidity Symptoms are Monitored and Maintained or Improved:   Monitor and assess patient's chronic conditions and comorbid symptoms for stability, deterioration, or improvement   Collaborate with multidisciplinary team to address chronic and comorbid conditions and prevent exacerbation or deterioration     Problem: Safety - Adult  Goal: Free from fall injury  12/29/2023 0543 by Estelle Adame RN  Outcome: Progressing  12/28/2023 1602 by Juan Manuel Gilbert RN  Outcome: Progressing  Flowsheets (Taken 12/28/2023 1602)  Free From Fall Injury: Based on caregiver fall risk screen, instruct family/caregiver to ask for assistance with transferring infant if caregiver noted to have fall risk factors     Problem: ABCDS Injury Assessment  Goal: Absence of physical injury  12/29/2023 0543 by Estelle Adame RN  Outcome: Progressing  12/28/2023 1602 by Juan Manuel Gilbert RN  Outcome: Progressing  Flowsheets (Taken 12/28/2023 1602)  Absence of Physical Injury: Implement safety measures based on patient assessment     Problem: Skin/Tissue Integrity  Goal: Absence of new skin

## 2023-12-29 NOTE — PROGRESS NOTES
[facility] Hospitalist Group  Progress Note    Patient: Sonido Capellan Age: 78 y.o. : 1945 MR#: [id number] SSN: [id number]      Subjective/24-hour events:     Major complaint is that of neuropathic pain in feet.  Reports feeling a bit better overall, however.    Assessment:   RLE cellulitis  DEV on CKD 4  DM 2  Coagulopathy secondary to coumadin, improved  Electrolyte abnormalities: hypomagnesemia, hypokalemia  Gross hematuria  Elevated LFTs  Class 1 obesity    Plan:   Continue vancomycin and Levaquin as ordered per ID, follow cultures.  Strict I's/O's.  Volume status/renal issues per nephrology.  Not on fluids or diuretics currently  SSI.  Would likely benefit from low-dose of long-acting insulin.  Monitor for  Trial of Neurontin.  Monitor response.  Continue to hold Coumadin, .daily INR.  Ordered.  PT/OT evals.  Ordered.  Supportive care otherwise.  Follow.    Objective:   VS: BP (!) 136/56   Pulse 68   Temp 98.1 °F (36.7 °C) (Oral)   Resp 20   Ht 1.651 m (5' 5\")   Wt 90.7 kg (200 lb)   SpO2 96%   BMI 33.28 kg/m²      Tmax/24hrs: Temp (24hrs), Av.4 °F (36.9 °C), Min:98.1 °F (36.7 °C), Max:98.8 °F (37.1 °C)    Intake/Output Summary (Last 24 hours) at 2023 0916  Last data filed at 2023 0824  Gross per 24 hour   Intake 1140 ml   Output 2750 ml   Net -1610 ml     Gen:  In NAD.  Lungs: Clear, no wheezes  Effort nonlabored.  CV: RRR.  Abdomen: Soft, NTTP.  Extremities: Warm, no pitting edema or ischemia.  Neuro:  Awake and alert, moves extremities spontaneously.      Current Facility-Administered Medications   Medication Dose Route Frequency    oxyCODONE-acetaminophen (PERCOCET) 5-325 MG per tablet 1 tablet  1 tablet Oral Q6H PRN    vancomycin (VANCOCIN) 1500 mg in sodium chloride 0.9% 500 mL IVPB  1,500 mg IntraVENous Once    cefepime (MAXIPIME) 1,000 mg in sodium chloride 0.9 % 50 mL IVPB (mini-bag)  1,000 mg IntraVENous Q12H    diphenhydrAMINE (BENADRYL) injection

## 2023-12-29 NOTE — PROGRESS NOTES
Infectious Disease progress Note        Reason: Right leg cellulitis, penicillin allergy    Current abx Prior abx   Levofloxacin, vancomycin since 12/28      Lines:       Assessment :  78-year-old male with a known history of hypertension, type 2 diabetes, CKD 3 presented to the emergency room on 12/27/2023 secondary to blood in urine,  right leg redness/swelling.     Clinical presentation consistent with partially treated right leg cellulitis, superficial right leg abscesses, hematuria ( secondary to anticoagulation)    Wound cx RLE 12/28- gnr    Acute on chronic kidney injury-some improvement in creatinine noted today.  Nephrology follow-up appreciated    Antibiotic management complicated due to history of allergy to penicillin, cephalexin.  Anaphylaxis to penicillin in childhood, allergic reaction to amoxicillin in the past, itching/rash with cephalexin.  No life-threatening allergy to cephalexin    Recommendations:    D/c  levofloxacin, start cefepime -monitor for allergic reaction.  D/c vancomycin since no gpc on gram stain, risk of worsening DEV  Follow-up wound culture right leg , modify antibiotics accordingly  Will finalize antibiotic regimen based on results of wound culture, clinical course  Remove Lucas.  Give voiding trial when ok with nephrologist      Above plan was discussed in details with patient, primary team. Please call me if any further questions or concerns. Will continue to participate in the care of this patient.      HPI:      Patient denies any fever, chills throughout this time.  He thinks that his right leg is doing much better.      Past Medical History:   Diagnosis Date    Allergic rhinitis     Benign paroxysmal positional vertigo     Dermatophytosis     Hypertrophy (benign) of prostate     Phlebitis and thrombophlebitis of other deep vessels of unspecified lower extremity (HCC)     Type II diabetes mellitus (HCC)        Past Surgical History:   Procedure Laterality Date    COLONOSCOPY  may have been missed, and remained in the body of the document. If questions arise, please contact our department.    Dr. Agatha Dawson, Infectious Disease Specialist  [phone]  December 29, 2023  9:00 AM

## 2023-12-30 LAB
ANION GAP SERPL CALC-SCNC: 0 MMOL/L (ref 3–18)
ANION GAP SERPL CALC-SCNC: 7 MMOL/L (ref 3–18)
BASOPHILS # BLD: 0.1 K/UL (ref 0–0.1)
BASOPHILS NFR BLD: 1 % (ref 0–2)
BUN SERPL-MCNC: 52 MG/DL (ref 7–18)
BUN SERPL-MCNC: 53 MG/DL (ref 7–18)
BUN/CREAT SERPL: 26 (ref 12–20)
BUN/CREAT SERPL: 28 (ref 12–20)
CALCIUM SERPL-MCNC: 7.7 MG/DL (ref 8.5–10.1)
CALCIUM SERPL-MCNC: 8.1 MG/DL (ref 8.5–10.1)
CHLORIDE SERPL-SCNC: 103 MMOL/L (ref 100–111)
CHLORIDE SERPL-SCNC: 106 MMOL/L (ref 100–111)
CO2 SERPL-SCNC: 25 MMOL/L (ref 21–32)
CO2 SERPL-SCNC: 29 MMOL/L (ref 21–32)
CREAT SERPL-MCNC: 1.92 MG/DL (ref 0.6–1.3)
CREAT SERPL-MCNC: 2.02 MG/DL (ref 0.6–1.3)
DIFFERENTIAL METHOD BLD: ABNORMAL
EOSINOPHIL # BLD: 0 K/UL (ref 0–0.4)
EOSINOPHIL NFR BLD: 0 % (ref 0–5)
ERYTHROCYTE [DISTWIDTH] IN BLOOD BY AUTOMATED COUNT: 13.3 % (ref 11.6–14.5)
GLUCOSE BLD STRIP.AUTO-MCNC: 191 MG/DL (ref 70–110)
GLUCOSE BLD STRIP.AUTO-MCNC: 213 MG/DL (ref 70–110)
GLUCOSE BLD STRIP.AUTO-MCNC: 219 MG/DL (ref 70–110)
GLUCOSE BLD STRIP.AUTO-MCNC: 260 MG/DL (ref 70–110)
GLUCOSE SERPL-MCNC: 154 MG/DL (ref 74–99)
GLUCOSE SERPL-MCNC: 163 MG/DL (ref 74–99)
HCT VFR BLD AUTO: 30.2 % (ref 36–48)
HGB BLD-MCNC: 10 G/DL (ref 13–16)
IMM GRANULOCYTES # BLD AUTO: 0.1 K/UL (ref 0–0.04)
IMM GRANULOCYTES NFR BLD AUTO: 1 % (ref 0–0.5)
INR PPP: 1.4 (ref 0.9–1.1)
LYMPHOCYTES # BLD: 1.2 K/UL (ref 0.9–3.6)
LYMPHOCYTES NFR BLD: 13 % (ref 21–52)
MAGNESIUM SERPL-MCNC: 1.4 MG/DL (ref 1.6–2.6)
MCH RBC QN AUTO: 32.7 PG (ref 24–34)
MCHC RBC AUTO-ENTMCNC: 33.1 G/DL (ref 31–37)
MCV RBC AUTO: 98.7 FL (ref 78–100)
MONOCYTES # BLD: 0.8 K/UL (ref 0.05–1.2)
MONOCYTES NFR BLD: 8 % (ref 3–10)
NEUTS SEG # BLD: 7.3 K/UL (ref 1.8–8)
NEUTS SEG NFR BLD: 78 % (ref 40–73)
NRBC # BLD: 0 K/UL (ref 0–0.01)
NRBC BLD-RTO: 0 PER 100 WBC
PLATELET # BLD AUTO: 205 K/UL (ref 135–420)
PMV BLD AUTO: 11.2 FL (ref 9.2–11.8)
POTASSIUM SERPL-SCNC: 3.9 MMOL/L (ref 3.5–5.5)
POTASSIUM SERPL-SCNC: 4 MMOL/L (ref 3.5–5.5)
PROTHROMBIN TIME: 17.1 SEC (ref 11.9–14.7)
RBC # BLD AUTO: 3.06 M/UL (ref 4.35–5.65)
SODIUM SERPL-SCNC: 135 MMOL/L (ref 136–145)
SODIUM SERPL-SCNC: 135 MMOL/L (ref 136–145)
TROPONIN I SERPL HS-MCNC: 15 NG/L (ref 0–78)
WBC # BLD AUTO: 9.3 K/UL (ref 4.6–13.2)

## 2023-12-30 PROCEDURE — 93005 ELECTROCARDIOGRAM TRACING: CPT | Performed by: PHYSICIAN ASSISTANT

## 2023-12-30 PROCEDURE — 6370000000 HC RX 637 (ALT 250 FOR IP): Performed by: HOSPITALIST

## 2023-12-30 PROCEDURE — 6360000002 HC RX W HCPCS: Performed by: INTERNAL MEDICINE

## 2023-12-30 PROCEDURE — 85025 COMPLETE CBC W/AUTO DIFF WBC: CPT

## 2023-12-30 PROCEDURE — 85610 PROTHROMBIN TIME: CPT

## 2023-12-30 PROCEDURE — 97162 PT EVAL MOD COMPLEX 30 MIN: CPT

## 2023-12-30 PROCEDURE — 84484 ASSAY OF TROPONIN QUANT: CPT

## 2023-12-30 PROCEDURE — 82962 GLUCOSE BLOOD TEST: CPT

## 2023-12-30 PROCEDURE — 97530 THERAPEUTIC ACTIVITIES: CPT

## 2023-12-30 PROCEDURE — 1100000000 HC RM PRIVATE

## 2023-12-30 PROCEDURE — 99232 SBSQ HOSP IP/OBS MODERATE 35: CPT | Performed by: FAMILY MEDICINE

## 2023-12-30 PROCEDURE — 2580000003 HC RX 258: Performed by: INTERNAL MEDICINE

## 2023-12-30 PROCEDURE — 6370000000 HC RX 637 (ALT 250 FOR IP): Performed by: FAMILY MEDICINE

## 2023-12-30 PROCEDURE — 83735 ASSAY OF MAGNESIUM: CPT

## 2023-12-30 PROCEDURE — 97165 OT EVAL LOW COMPLEX 30 MIN: CPT

## 2023-12-30 PROCEDURE — 94761 N-INVAS EAR/PLS OXIMETRY MLT: CPT

## 2023-12-30 PROCEDURE — 36415 COLL VENOUS BLD VENIPUNCTURE: CPT

## 2023-12-30 PROCEDURE — 80048 BASIC METABOLIC PNL TOTAL CA: CPT

## 2023-12-30 RX ORDER — INSULIN GLARGINE 100 [IU]/ML
5 INJECTION, SOLUTION SUBCUTANEOUS NIGHTLY
Status: DISCONTINUED | OUTPATIENT
Start: 2023-12-30 | End: 2024-01-03 | Stop reason: HOSPADM

## 2023-12-30 RX ADMIN — GABAPENTIN 100 MG: 100 CAPSULE ORAL at 08:19

## 2023-12-30 RX ADMIN — INSULIN LISPRO 2 UNITS: 100 INJECTION, SOLUTION INTRAVENOUS; SUBCUTANEOUS at 12:04

## 2023-12-30 RX ADMIN — CEFEPIME 1000 MG: 1 INJECTION, POWDER, FOR SOLUTION INTRAMUSCULAR; INTRAVENOUS at 22:29

## 2023-12-30 RX ADMIN — NADOLOL 20 MG: 20 TABLET ORAL at 08:19

## 2023-12-30 RX ADMIN — GABAPENTIN 100 MG: 100 CAPSULE ORAL at 14:18

## 2023-12-30 RX ADMIN — INSULIN GLARGINE 5 UNITS: 100 INJECTION, SOLUTION SUBCUTANEOUS at 22:03

## 2023-12-30 RX ADMIN — TAMSULOSIN HYDROCHLORIDE 0.4 MG: 0.4 CAPSULE ORAL at 08:19

## 2023-12-30 RX ADMIN — INSULIN LISPRO 1 UNITS: 100 INJECTION, SOLUTION INTRAVENOUS; SUBCUTANEOUS at 16:37

## 2023-12-30 RX ADMIN — CEFEPIME 1000 MG: 1 INJECTION, POWDER, FOR SOLUTION INTRAMUSCULAR; INTRAVENOUS at 08:19

## 2023-12-30 RX ADMIN — GABAPENTIN 100 MG: 100 CAPSULE ORAL at 22:03

## 2023-12-30 ASSESSMENT — PAIN SCALES - GENERAL
PAINLEVEL_OUTOF10: 0
PAINLEVEL_OUTOF10: 0
PAINLEVEL_OUTOF10: 4
PAINLEVEL_OUTOF10: 0
PAINLEVEL_OUTOF10: 0

## 2023-12-30 NOTE — PROGRESS NOTES
RLE wound dressed per order. Wound cleansed with wound , adaptic applied, ABD pads, and wrapped in Kerlex. Pt tolerated well.

## 2023-12-30 NOTE — PROGRESS NOTES
In Patient Progress note      Admit Date: 12/27/2023    Impression:     #1 Acute kidney injury on CKD 3b  Baseline difficult to determine as his last creatinine in epic was 1.5 in 2019, ,longstanding history of diabetes with complication with history of microalbuminuria and his etiology of CKD is likely underlying diabetic nephropathy. 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 intake   #4 allow systolic blood pressures of 130s to 150s in acute setting  #5 management of elevated INR /hematuria  per primary team  #6 renally dose medications for EGFR of less than 30  #7 monitor electrolytes and renal functions daily    Will need nephro f/u after discharge in 2-3 weeks   Labs with PCP in 1 week      Discussed plan with primary team     Please call with questions     Amador Clay MD FASN  Cell [phone]  Pager: [phone]  Fax   [phone]       Subjective:     - No acute over night events.  - respiratory - stable  - hemodynamics - stable, no pressrs  - UOP-good  - Nutrition -ok    Objective:     BP (!) 148/83   Pulse 91   Temp 98 °F (36.7 °C) (Oral)   Resp 18   Ht 1.651 m (5' 5\")   Wt 90.7 kg (200 lb)   SpO2 99%   BMI 33.28 kg/m²       Intake/Output Summary (Last 24 hours) at 12/30/2023 1328  Last data filed at 12/30/2023 0925  Gross per 24 hour   Intake 240 ml   Output 1600 ml   Net -1360 ml         Physical Exam:     Patient is in no apparent distress.   HEENT:  Head is normocephalic and atraumatic.   Neck: no cervical lymphadenopathy or thyromegaly.   Lungs: good air entry, clear to auscultation bilaterally.   Cardiovascular system: S1, S2, regular rate and rhythm.  Abdomen: soft, non tender, non distended.   Extremities: rt LE edema , open wound with drainage , Left LE no edema   Integumentary: skin is grossly intact.   Neurologic: Alert, oriented time three.     Data Review:    Recent Labs     12/30/23  0216   WBC 9.3   RBC 3.06*   HCT 30.2*   MCV 98.7   MCH 32.7   MCHC 33.1   RDW 13.3   MPV 11.2       Recent Labs     12/27/23  1555 12/29/23  0109 12/30/23  0216   BUN 71* 64* 53*   K 3.6 3.4* 3.9    139 135*    108 106   CO2 26 25 29         Amador Clay MD

## 2023-12-30 NOTE — PROGRESS NOTES
[facility] Hospitalist Group  Progress Note    Patient: Sonido Capellan Age: 78 y.o. : 1945 MR#: [id number] SSN: [id number]      Subjective/24-hour events:     No new complaints.    Assessment:   RLE cellulitis  DEV on CKD 4  DM 2  Coagulopathy secondary to coumadin, improved  Electrolyte abnormalities: hypomagnesemia, hypokalemia  Gross hematuria  Elevated LFTs  Class 1 obesity    Plan:   Antibiotics per ID.  Remains on vancomycin and Levaquin.  Continue to monitor intake and output.  Volume management per nephrology.  Add low-dose Lantus today continue SSI.  Continue to adjust insulin as necessary.  Neurontin trial initiated yesterday.  Monitor response.  Coumadin remains on hold.  PT/OT, mobilize as able/tolerated.  Disposition to be determined.  Continue supportive care otherwise.  Follow.      Objective:   VS: BP (!) 126/53   Pulse 57   Temp 97.9 °F (36.6 °C) (Oral)   Resp 20   Ht 1.651 m (5' 5\")   Wt 90.7 kg (200 lb)   SpO2 95%   BMI 33.28 kg/m²      Tmax/24hrs: Temp (24hrs), Av.5 °F (36.9 °C), Min:97.9 °F (36.6 °C), Max:99.3 °F (37.4 °C)    Intake/Output Summary (Last 24 hours) at 2023 0908  Last data filed at 2023 0534  Gross per 24 hour   Intake 120 ml   Output 2000 ml   Net -1880 ml     Gen:  In NAD.  Lungs: Clear, no wheezes  Effort nonlabored.  CV: RRR.  Abdomen: Soft, NTTP.  Extremities: Warm, no pitting edema or ischemia.  Neuro:  Awake and alert, moves extremities spontaneously.      Current Facility-Administered Medications   Medication Dose Route Frequency    cefepime (MAXIPIME) 1,000 mg in sodium chloride 0.9 % 50 mL IVPB (mini-bag)  1,000 mg IntraVENous Q12H    diphenhydrAMINE (BENADRYL) injection 25 mg  25 mg IntraVENous Q4H PRN    gabapentin (NEURONTIN) capsule 100 mg  100 mg Oral TID    ondansetron (ZOFRAN-ODT) disintegrating tablet 4 mg  4 mg Oral Q8H PRN    Or    ondansetron (ZOFRAN) injection 4 mg  4 mg IntraVENous Q6H PRN    polyethylene

## 2023-12-30 NOTE — PLAN OF CARE
Problem: Physical Therapy - Adult  Goal: By Discharge: Performs mobility at highest level of function for planned discharge setting.  See evaluation for individualized goals.  Description: 1.  Patient will move from supine to sit and sit to supine  in bed with modified independence.    2.  Patient will transfer from bed to chair and chair to bed with supervision/set-up using the least restrictive device.  3.  Patient will perform sit to stand with supervision/set-up.  4.  Patient will ambulate with supervision/set-up for 50 feet with the least restrictive device.   5.  Patient will ascend/descend 3 stairs with 1-2 handrail(s) with supervision/set-up      PLOF, retired, recent SC use, I ADLS and activities, lives alone, drives  Outcome: Progressing   PHYSICAL THERAPY EVALUATION    Patient: Sonido Capellan (78 y.o. male)  Date: 12/30/2023  Primary Diagnosis: Urinary retention [R33.9]  Cellulitis [L03.90]  DEV (acute kidney injury) (HCC) [N17.9]  Supratherapeutic INR [R79.1]  Cellulitis of right lower extremity [L03.115]  Deep vein thrombosis (DVT) of proximal vein of right lower extremity, unspecified chronicity (HCC) [I82.4Y1]  Acute on chronic congestive heart failure, unspecified heart failure type (HCC) [I50.9]       Precautions:  Fall Risk,  ,  ,  ,  ,  ,  ,      ASSESSMENT :patient cleared by nursing for consult. Patient alert and supine in bed and agreeable for consult. Note C/O nerve pain B LE and that rubbing/massaging his right leg does help with some of the pain. He was CGA for supine to sit on EOB and sit to supine SBA, sit to stand CGA and CGA with RW for side steps to head of bed. He appears most limited due to his nerve pain complaints. He was able to use bed rails to pull self up in the bed. He was left supine in bed with breakfast tray and call bell in reach.       DEFICITS/IMPAIRMENTS:    , Body Structures, Functions, Activity Limitations Requiring Skilled Therapeutic Intervention: Decreased  Impaired  Standing - Static: Occasional;Good  Standing - Dynamic: Occasional;Good  Wheelchair Mobility:        Ambulation/Gait Training:  Ambulation  Surface: Level tile  Device: Rolling Walker  Assistance: Contact guard assistance  Gait Deviations: Decreased step length;Decreased step height  Distance: side steps to head of bed     Device: Rolling Walker     Assistance: Contact guard assistance  Gait Deviations: Decreased step length;Decreased step height  Distance: side steps to head of bed  Gait  Overall Level of Assistance: Contact-guard assistance  Distance (ft): 3 Feet  Assistive Device: Walker, rolling  Interventions: Safety awareness training;Verbal cues;Manual cues  Base of Support: Shift to left;Widened  Speed/Khalida: Slow  Step Length: Left shortened;Right shortened  Stance: Left increased;Right decreased  Gait Abnormalities: Antalgic;Decreased step clearance        Therapeutic Exercises/Neuromuscular Re-education:     Pain:  Pain level pre-treatment: 1-2/10   Pain level post-treatment: 1-2/10   Pain Intervention(s): Medication (see MAR); Rest, Ice, Repositioning   Response to intervention: Nurse notified      Activity Tolerance:   Activity Tolerance: Patient tolerated evaluation without incident;Patient limited by pain  Please refer to the flowsheet for vital signs taken during this treatment.    After treatment:   []         Patient left in no apparent distress sitting up in chair  [x]         Patient left in no apparent distress in bed  [x]         Call bell left within reach  []         Nursing notified  []         Caregiver present  []         Bed alarm activated  []         SCDs applied    COMMUNICATION/EDUCATION:   Patient Education  Education Given To: Patient  Education Provided: Role of Therapy;Plan of Care  Education Method: Demonstration;Verbal;Teach Back  Barriers to Learning: None  Education Outcome: Demonstrated understanding    Thank you for this referral.  Amaya Brown PT  Minutes:

## 2023-12-30 NOTE — PLAN OF CARE
:  Recommendations and Planned Interventions:   [x]               Self Care Training                  [x]      Therapeutic Activities  [x]               Functional Mobility Training   []      Cognitive Retraining  [x]               Therapeutic Exercises           [x]      Endurance Activities  [x]               Balance Training                    []      Neuromuscular Re-Education  []               Visual/Perceptual Training     [x]      Home Safety Training  [x]               Patient Education                   [x]      Family Training/Education  []               Other (comment):    Frequency/Duration: Patient will be followed by occupational therapy to address goals, 1-2 times per day/3-5 days per week to address goals.    Further Equipment Recommendations for Discharge: none at this time; he may choose to acquire a seat for his shower    AMPA: AM-PAC Inpatient Daily Activity Raw Score: 21    Current research shows that an AM-PAC score of 18 or greater is associated with a discharge to the patient's home setting.  Based on an AM-PAC score and their current ADL deficits; it is recommended that the patient have 2-3 sessions per week of Occupational Therapy at d/c to increase the patient's independence.        This AMPA score should be considered in conjunction with interdisciplinary team recommendations to determine the most appropriate discharge setting. Patient's social support, diagnosis, medical stability, and prior level of function should also be taken into consideration.     SUBJECTIVE:   Patient stated, “I want to get home and be with my dogs.”    OBJECTIVE DATA SUMMARY:     Past Medical History:   Diagnosis Date    Allergic rhinitis     Benign paroxysmal positional vertigo     Dermatophytosis     Hypertrophy (benign) of prostate     Phlebitis and thrombophlebitis of other deep vessels of unspecified lower extremity (HCC)     Type II diabetes mellitus (HCC)      Past Surgical History:   Procedure  assist for efficiency   Pain:  Pain level pre-treatment: 0/10   Pain level post-treatment: 0/10     Activity Tolerance:    No SOB noted; generalized fatigue  Please refer to the flowsheet for vital signs taken during this treatment.    After treatment:   [] Patient left in no apparent distress sitting up in chair  [x] Patient left in no apparent distress in bed  [x] Call bell left within reach  [] Nursing notified  [] Caregiver present  [] Bed alarm activated    COMMUNICATION/EDUCATION:   Patient Education  Education Given To: Patient  Education Provided: Role of Therapy  Education Method: Verbal  Barriers to Learning: None  Education Outcome: Verbalized understanding    Thank you for this referral.  Elaine Garcia OTR/L  Minutes: 11    Eval Complexity: Decision Making: Low Complexity

## 2023-12-31 LAB
ANION GAP SERPL CALC-SCNC: 5 MMOL/L (ref 3–18)
BASOPHILS # BLD: 0 K/UL (ref 0–0.1)
BASOPHILS NFR BLD: 0 % (ref 0–2)
BUN SERPL-MCNC: 50 MG/DL (ref 7–18)
BUN/CREAT SERPL: 29 (ref 12–20)
CALCIUM SERPL-MCNC: 7.9 MG/DL (ref 8.5–10.1)
CHLORIDE SERPL-SCNC: 103 MMOL/L (ref 100–111)
CO2 SERPL-SCNC: 27 MMOL/L (ref 21–32)
CREAT SERPL-MCNC: 1.74 MG/DL (ref 0.6–1.3)
DIFFERENTIAL METHOD BLD: ABNORMAL
EKG ATRIAL RATE: 55 BPM
EKG DIAGNOSIS: NORMAL
EKG P AXIS: 43 DEGREES
EKG P-R INTERVAL: 156 MS
EKG Q-T INTERVAL: 476 MS
EKG QRS DURATION: 140 MS
EKG QTC CALCULATION (BAZETT): 455 MS
EKG R AXIS: -66 DEGREES
EKG T AXIS: 13 DEGREES
EKG VENTRICULAR RATE: 55 BPM
EOSINOPHIL # BLD: 0 K/UL (ref 0–0.4)
EOSINOPHIL NFR BLD: 0 % (ref 0–5)
ERYTHROCYTE [DISTWIDTH] IN BLOOD BY AUTOMATED COUNT: 13.1 % (ref 11.6–14.5)
GLUCOSE BLD STRIP.AUTO-MCNC: 157 MG/DL (ref 70–110)
GLUCOSE BLD STRIP.AUTO-MCNC: 224 MG/DL (ref 70–110)
GLUCOSE BLD STRIP.AUTO-MCNC: 232 MG/DL (ref 70–110)
GLUCOSE BLD STRIP.AUTO-MCNC: 232 MG/DL (ref 70–110)
GLUCOSE SERPL-MCNC: 136 MG/DL (ref 74–99)
HCT VFR BLD AUTO: 28.1 % (ref 36–48)
HGB BLD-MCNC: 9.1 G/DL (ref 13–16)
IMM GRANULOCYTES # BLD AUTO: 0 K/UL (ref 0–0.04)
IMM GRANULOCYTES NFR BLD AUTO: 0 % (ref 0–0.5)
INR PPP: 1.3 (ref 0.9–1.1)
LYMPHOCYTES # BLD: 1.6 K/UL (ref 0.9–3.6)
LYMPHOCYTES NFR BLD: 14 % (ref 21–52)
MCH RBC QN AUTO: 31.8 PG (ref 24–34)
MCHC RBC AUTO-ENTMCNC: 32.4 G/DL (ref 31–37)
MCV RBC AUTO: 98.3 FL (ref 78–100)
MONOCYTES # BLD: 0.9 K/UL (ref 0.05–1.2)
MONOCYTES NFR BLD: 8 % (ref 3–10)
NEUTS SEG # BLD: 8.9 K/UL (ref 1.8–8)
NEUTS SEG NFR BLD: 77 % (ref 40–73)
NRBC # BLD: 0 K/UL (ref 0–0.01)
NRBC BLD-RTO: 0 PER 100 WBC
PLATELET # BLD AUTO: 196 K/UL (ref 135–420)
PMV BLD AUTO: 11.4 FL (ref 9.2–11.8)
POTASSIUM SERPL-SCNC: 3.9 MMOL/L (ref 3.5–5.5)
PROTHROMBIN TIME: 16.3 SEC (ref 11.9–14.7)
RBC # BLD AUTO: 2.86 M/UL (ref 4.35–5.65)
SODIUM SERPL-SCNC: 135 MMOL/L (ref 136–145)
WBC # BLD AUTO: 11.5 K/UL (ref 4.6–13.2)

## 2023-12-31 PROCEDURE — 99232 SBSQ HOSP IP/OBS MODERATE 35: CPT | Performed by: FAMILY MEDICINE

## 2023-12-31 PROCEDURE — 6370000000 HC RX 637 (ALT 250 FOR IP): Performed by: FAMILY MEDICINE

## 2023-12-31 PROCEDURE — 6370000000 HC RX 637 (ALT 250 FOR IP): Performed by: HOSPITALIST

## 2023-12-31 PROCEDURE — 1100000000 HC RM PRIVATE

## 2023-12-31 PROCEDURE — 2580000003 HC RX 258: Performed by: INTERNAL MEDICINE

## 2023-12-31 PROCEDURE — 36415 COLL VENOUS BLD VENIPUNCTURE: CPT

## 2023-12-31 PROCEDURE — 82962 GLUCOSE BLOOD TEST: CPT

## 2023-12-31 PROCEDURE — 85025 COMPLETE CBC W/AUTO DIFF WBC: CPT

## 2023-12-31 PROCEDURE — 6360000002 HC RX W HCPCS: Performed by: PHYSICIAN ASSISTANT

## 2023-12-31 PROCEDURE — 93010 ELECTROCARDIOGRAM REPORT: CPT | Performed by: INTERNAL MEDICINE

## 2023-12-31 PROCEDURE — 85610 PROTHROMBIN TIME: CPT

## 2023-12-31 PROCEDURE — 80048 BASIC METABOLIC PNL TOTAL CA: CPT

## 2023-12-31 PROCEDURE — 6360000002 HC RX W HCPCS: Performed by: INTERNAL MEDICINE

## 2023-12-31 RX ORDER — MAGNESIUM SULFATE IN WATER 40 MG/ML
2000 INJECTION, SOLUTION INTRAVENOUS ONCE
Status: COMPLETED | OUTPATIENT
Start: 2023-12-31 | End: 2023-12-31

## 2023-12-31 RX ADMIN — GABAPENTIN 100 MG: 100 CAPSULE ORAL at 21:08

## 2023-12-31 RX ADMIN — CEFEPIME 1000 MG: 1 INJECTION, POWDER, FOR SOLUTION INTRAMUSCULAR; INTRAVENOUS at 09:53

## 2023-12-31 RX ADMIN — GABAPENTIN 100 MG: 100 CAPSULE ORAL at 14:30

## 2023-12-31 RX ADMIN — TAMSULOSIN HYDROCHLORIDE 0.4 MG: 0.4 CAPSULE ORAL at 09:52

## 2023-12-31 RX ADMIN — INSULIN LISPRO 1 UNITS: 100 INJECTION, SOLUTION INTRAVENOUS; SUBCUTANEOUS at 18:21

## 2023-12-31 RX ADMIN — INSULIN GLARGINE 5 UNITS: 100 INJECTION, SOLUTION SUBCUTANEOUS at 21:07

## 2023-12-31 RX ADMIN — ACETAMINOPHEN 325MG 650 MG: 325 TABLET ORAL at 10:04

## 2023-12-31 RX ADMIN — GABAPENTIN 100 MG: 100 CAPSULE ORAL at 09:52

## 2023-12-31 RX ADMIN — MAGNESIUM SULFATE HEPTAHYDRATE 2000 MG: 40 INJECTION, SOLUTION INTRAVENOUS at 02:53

## 2023-12-31 RX ADMIN — INSULIN LISPRO 1 UNITS: 100 INJECTION, SOLUTION INTRAVENOUS; SUBCUTANEOUS at 12:25

## 2023-12-31 RX ADMIN — ACETAMINOPHEN 325MG 650 MG: 325 TABLET ORAL at 03:05

## 2023-12-31 ASSESSMENT — PAIN SCALES - GENERAL
PAINLEVEL_OUTOF10: 0
PAINLEVEL_OUTOF10: 0
PAINLEVEL_OUTOF10: 3
PAINLEVEL_OUTOF10: 4
PAINLEVEL_OUTOF10: 0
PAINLEVEL_OUTOF10: 0

## 2023-12-31 ASSESSMENT — PAIN DESCRIPTION - LOCATION: LOCATION: LEG

## 2023-12-31 ASSESSMENT — PAIN DESCRIPTION - ORIENTATION: ORIENTATION: LEFT;RIGHT

## 2023-12-31 ASSESSMENT — PAIN DESCRIPTION - DESCRIPTORS: DESCRIPTORS: ACHING

## 2023-12-31 NOTE — PROGRESS NOTES
[facility] Hospitalist Group  Progress Note    Patient: Sonido Capellan Age: 78 y.o. : 1945 MR#: [id number] SSN: [id number]      Subjective/24-hour events:     Had some chest pain overnight, which is a usual occurrence for him.  Nothing acute on EKG, troponin normal.    HR has been a bit low at times.  No acute changes in overall clinical condition reported.    Assessment:   RLE cellulitis  DEV on CKD 4  DM 2  Coagulopathy secondary to coumadin, improved  Electrolyte abnormalities: hypomagnesemia, hypokalemia  Gross hematuria  Elevated LFTs  Class 1 obesity    Plan:   Continue ABX, as ordered. Antibiotics per ID.  Cultures growing Pseudomonas and Strep.  Creatinine continues to improve.  Nephrology continues to follow for volume management.  Continue low dose lantus added yesterday, SSI.  Parameters placed on nadolol.  Monitor HR.  Continue neurontin.  PT/OT.  Disposition TBD.  Supportive care o/w.      Objective:   VS: BP (!) 149/65   Pulse 53   Temp 98.3 °F (36.8 °C) (Oral)   Resp 20   Ht 1.651 m (5' 5\")   Wt 90.7 kg (200 lb)   SpO2 96%   BMI 33.28 kg/m²      Tmax/24hrs: Temp (24hrs), Av.9 °F (37.2 °C), Min:98 °F (36.7 °C), Max:99.8 °F (37.7 °C)    Intake/Output Summary (Last 24 hours) at 2023 0817  Last data filed at 2023 0810  Gross per 24 hour   Intake 360 ml   Output 2300 ml   Net -1940 ml     Gen:  In NAD.  Lungs: Clear, no wheezes  Effort nonlabored.  CV: RRR.  Abdomen: Soft, NTTP.  Extremities: Warm, no pitting edema or ischemia.  Neuro:  Awake and alert, moves extremities spontaneously.      Current Facility-Administered Medications   Medication Dose Route Frequency    insulin glargine (LANTUS) injection vial 5 Units  5 Units SubCUTAneous Nightly    cefepime (MAXIPIME) 1,000 mg in sodium chloride 0.9 % 50 mL IVPB (mini-bag)  1,000 mg IntraVENous Q12H    diphenhydrAMINE (BENADRYL) injection 25 mg  25 mg IntraVENous Q4H PRN    gabapentin (NEURONTIN)  Basophils Absolute 0.0 0.0 - 0.1 K/UL    Absolute Immature Granulocyte 0.0 0.00 - 0.04 K/UL    Differential Type AUTOMATED     Protime-INR    Collection Time: 12/31/23  1:32 AM   Result Value Ref Range    Protime 16.3 (H) 11.9 - 14.7 sec    INR 1.3 (H) 0.9 - 1.1     POCT Glucose    Collection Time: 12/31/23  8:13 AM   Result Value Ref Range    POC Glucose 157 (H) 70 - 110 mg/dL         Signed By: Julien Wen MD

## 2023-12-31 NOTE — PROGRESS NOTES
In Patient Progress note      Admit Date: 12/27/2023    Impression:     #1 Acute kidney injury on CKD 3b  Baseline difficult to determine as his last creatinine in epic was 1.5 in 2019, ,longstanding history of diabetes with complication with history of microalbuminuria and his etiology of CKD is likely underlying diabetic nephropathy. 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 intake   #4 allow systolic blood pressures of 130s to 150s in acute setting  #5 renally dose medications for EGFR of less than 30  #6 monitor electrolytes and renal functions daily       Discussed plan with primary team     Please call with questions     Amador Clay MD FASN  Cell [phone]  Pager: [phone]  Fax   [phone]       Subjective:     - No acute over night events.  - respiratory - stable  - hemodynamics - stable, no pressrs  - UOP-good  - Nutrition -ok    Objective:     BP (!) 131/47   Pulse 52   Temp 99 °F (37.2 °C) (Oral)   Resp 16   Ht 1.651 m (5' 5\")   Wt 90.7 kg (200 lb)   SpO2 96%   BMI 33.28 kg/m²       Intake/Output Summary (Last 24 hours) at 12/31/2023 1337  Last data filed at 12/31/2023 0810  Gross per 24 hour   Intake 120 ml   Output 1900 ml   Net -1780 ml         Physical Exam:     Patient is in no apparent distress.   HEENT: Head is normocephalic and atraumatic.   Neck: no cervical lymphadenopathy or thyromegaly.   Lungs: good air entry,

## 2023-12-31 NOTE — PROGRESS NOTES
Марина held for lowe heartrate (40s-mid 50s) Patient asymptomatic at this time. Dr Wen messaged via perfect serve to advise on parameters. Dr Wen states he will assess patient.

## 2023-12-31 NOTE — PROGRESS NOTES
Notified by RN that patient had 5 beats of Vtach per telemetry and just started complaining of left sided chest pain which he states is a common occurrence for him, and it's \"due to asbestos exposure\"    Plan:  Stat EKG, troponin, BMP, Mg

## 2023-12-31 NOTE — PROGRESS NOTES
Pharmacy Note     Cefepime 1000mg q12h ordered for treatment of SSTI. Per [facility] Policy, cefepime will be changed to 2000 mg q24h.     Estimated Creatinine Clearance: Estimated Creatinine Clearance: 37 mL/min (A) (based on SCr of 1.74 mg/dL (H)).  Dialysis Status, DEV, CKD: -  BMI:  Body mass index is 33.28 kg/m².    Rationale for Adjustment:  [facility] renal dosing policy.    Pharmacy will continue to monitor and adjust dose as necessary.      Please call with any questions.    Thank you,  SAMUEL C[place], [facility]

## 2023-12-31 NOTE — PROGRESS NOTES
Per telemetry: pt had 5 beats V-tach. Pt c/o chest pain on left side with SOB. Pt stated this happened earlier this  morning and has happened before with the chest pain eventually easing. Pt reports he has COPD and asbestos in lungs. Pt now states that pain is easing. MD was informed and EKG and labs were ordered.

## 2024-01-01 ENCOUNTER — APPOINTMENT (OUTPATIENT)
Facility: HOSPITAL | Age: 79
DRG: 638 | End: 2024-01-01
Payer: MEDICARE

## 2024-01-01 LAB
BACTERIA SPEC CULT: ABNORMAL
GLUCOSE BLD STRIP.AUTO-MCNC: 142 MG/DL (ref 70–110)
GLUCOSE BLD STRIP.AUTO-MCNC: 163 MG/DL (ref 70–110)
GLUCOSE BLD STRIP.AUTO-MCNC: 199 MG/DL (ref 70–110)
GLUCOSE BLD STRIP.AUTO-MCNC: 332 MG/DL (ref 70–110)
GRAM STN SPEC: ABNORMAL
SERVICE CMNT-IMP: ABNORMAL

## 2024-01-01 PROCEDURE — 36569 INSJ PICC 5 YR+ W/O IMAGING: CPT

## 2024-01-01 PROCEDURE — 94761 N-INVAS EAR/PLS OXIMETRY MLT: CPT

## 2024-01-01 PROCEDURE — 6370000000 HC RX 637 (ALT 250 FOR IP): Performed by: FAMILY MEDICINE

## 2024-01-01 PROCEDURE — 05HY33Z INSERTION OF INFUSION DEVICE INTO UPPER VEIN, PERCUTANEOUS APPROACH: ICD-10-PCS | Performed by: INTERNAL MEDICINE

## 2024-01-01 PROCEDURE — 6370000000 HC RX 637 (ALT 250 FOR IP): Performed by: HOSPITALIST

## 2024-01-01 PROCEDURE — 6360000002 HC RX W HCPCS: Performed by: INTERNAL MEDICINE

## 2024-01-01 PROCEDURE — 2580000003 HC RX 258: Performed by: INTERNAL MEDICINE

## 2024-01-01 PROCEDURE — 99232 SBSQ HOSP IP/OBS MODERATE 35: CPT | Performed by: FAMILY MEDICINE

## 2024-01-01 PROCEDURE — 82962 GLUCOSE BLOOD TEST: CPT

## 2024-01-01 PROCEDURE — 73030 X-RAY EXAM OF SHOULDER: CPT

## 2024-01-01 PROCEDURE — 3E03329 INTRODUCTION OF OTHER ANTI-INFECTIVE INTO PERIPHERAL VEIN, PERCUTANEOUS APPROACH: ICD-10-PCS | Performed by: INTERNAL MEDICINE

## 2024-01-01 PROCEDURE — 1100000000 HC RM PRIVATE

## 2024-01-01 RX ORDER — TRAMADOL HYDROCHLORIDE 50 MG/1
50 TABLET ORAL EVERY 6 HOURS PRN
Status: DISCONTINUED | OUTPATIENT
Start: 2024-01-01 | End: 2024-01-03 | Stop reason: HOSPADM

## 2024-01-01 RX ADMIN — CEFEPIME 2000 MG: 2 INJECTION, POWDER, FOR SOLUTION INTRAVENOUS at 20:46

## 2024-01-01 RX ADMIN — NADOLOL 20 MG: 20 TABLET ORAL at 09:08

## 2024-01-01 RX ADMIN — INSULIN LISPRO 4 UNITS: 100 INJECTION, SOLUTION INTRAVENOUS; SUBCUTANEOUS at 22:38

## 2024-01-01 RX ADMIN — INSULIN GLARGINE 5 UNITS: 100 INJECTION, SOLUTION SUBCUTANEOUS at 20:46

## 2024-01-01 RX ADMIN — ACETAMINOPHEN 325MG 650 MG: 325 TABLET ORAL at 20:46

## 2024-01-01 RX ADMIN — GABAPENTIN 100 MG: 100 CAPSULE ORAL at 09:08

## 2024-01-01 RX ADMIN — GABAPENTIN 100 MG: 100 CAPSULE ORAL at 14:25

## 2024-01-01 RX ADMIN — GABAPENTIN 100 MG: 100 CAPSULE ORAL at 20:46

## 2024-01-01 RX ADMIN — TAMSULOSIN HYDROCHLORIDE 0.4 MG: 0.4 CAPSULE ORAL at 09:07

## 2024-01-01 ASSESSMENT — PAIN SCALES - GENERAL
PAINLEVEL_OUTOF10: 0

## 2024-01-01 NOTE — PLAN OF CARE
Problem: Chronic Conditions and Co-morbidities  Goal: Patient's chronic conditions and co-morbidity symptoms are monitored and maintained or improved  Outcome: Progressing  Flowsheets (Taken 12/31/2023 1955)  Care Plan - Patient's Chronic Conditions and Co-Morbidity Symptoms are Monitored and Maintained or Improved: Monitor and assess patient's chronic conditions and comorbid symptoms for stability, deterioration, or improvement     Problem: Safety - Adult  Goal: Free from fall injury  Outcome: Progressing     Problem: ABCDS Injury Assessment  Goal: Absence of physical injury  Outcome: Progressing     Problem: Skin/Tissue Integrity  Goal: Absence of new skin breakdown  Description: 1.  Monitor for areas of redness and/or skin breakdown  2.  Assess vascular access sites hourly  3.  Every 4-6 hours minimum:  Change oxygen saturation probe site  4.  Every 4-6 hours:  If on nasal continuous positive airway pressure, respiratory therapy assess nares and determine need for appliance change or resting period.  Outcome: Progressing     Problem: Pain  Goal: Verbalizes/displays adequate comfort level or baseline comfort level  Outcome: Progressing     Problem: Nutrition Deficit:  Goal: Optimize nutritional status  Outcome: Progressing

## 2024-01-01 NOTE — CONSULTS
Vascular access assessment completed. PICC ordered due to extended outpatient IV antibiotics. Patient does have CKD4. Nephrology has reviewed case and provided clearance for patient to have PICC line. 4 Fr. PICC line placed in right brachial vein and confirmed with 3CG. External length is 0 cm, internal length is 35 cm and arm circumference is 33 cm. Lot # is [id number], REF# is [id number] and expiration is 06-. Local lidocaine utilized during procedure and patient did not present with any negative reactions to injection. Patient tolerated procedure well and CHG dressing applied to site. Initial dressing should be changed no later than 08 Jan. 2024.

## 2024-01-01 NOTE — PROGRESS NOTES
Met DTV - has voided twice as per flow sheet - does c/o some burning with urination - will notify MD

## 2024-01-01 NOTE — PROGRESS NOTES
Per Dr. Wen - talked to Nephrology and ok to remove salas - per MD DTV 8h.  Salas removed - patient tolerated well.  Urinal provided and will monitor.  Dr. Wen will let Nephrology know we need a note placed for PICC line insertion d/t CKD.

## 2024-01-01 NOTE — PROGRESS NOTES
Infectious Disease progress Note        Reason: Right leg cellulitis, penicillin allergy    Current abx Prior abx   Levofloxacin, vancomycin since 12/28-12/29  Cefepime since 12/29      Lines:       Assessment :  78-year-old male with a known history of hypertension, type 2 diabetes, CKD 3 presented to the emergency room on 12/27/2023 secondary to blood in urine,  right leg redness/swelling.     Clinical presentation consistent with partially treated right leg cellulitis, superficial right leg abscesses, hematuria ( secondary to anticoagulation)    Wound cx RLE 12/28- pseudomonas, yeast, skin kemi.   Yeast and wound culture likely colonizer    Acute on chronic kidney injury-some improvement in creatinine noted today.  Nephrology follow-up appreciated    Antibiotic management complicated due to history of allergy to penicillin, cephalexin.  Anaphylaxis to penicillin in childhood, allergic reaction to amoxicillin in the past, itching/rash with cephalexin.  No life-threatening allergy to cephalexin    Recommendations:    continue cefepime till 1/11/24- no oral antibiotic options available to treat pseudomonas RLE wound  Place midline for outpatient IV antibiotics  Continue local wound care right lower extremity  Follow-up nephrology recommendations  Await final disposition plan Home versus SNF.  Will need to make arrangements for home health/home IV antibiotics if plans to discharge patient home      Above plan was discussed in details with patient, dr Wen. Please call me if any further questions or concerns. Will continue to participate in the care of this patient.      HPI:      Patient denies any fever, chills throughout this time.  Feels better.  Improved pain right leg  Past Medical History:   Diagnosis Date    Allergic rhinitis     Benign paroxysmal positional vertigo     Dermatophytosis     Hypertrophy (benign) of prostate     Phlebitis and thrombophlebitis of other deep vessels of unspecified lower extremity    Substance and Sexual Activity    Alcohol use: No    Drug use: No    Sexual activity: Not on file   Other Topics Concern    Not on file   Social History Narrative    Not on file     Social Determinants of Health     Financial Resource Strain: Not on file   Food Insecurity: No Food Insecurity (2023)    Hunger Vital Sign     Worried About Running Out of Food in the Last Year: Never true     Ran Out of Food in the Last Year: Never true   Transportation Needs: No Transportation Needs (2023)    PRAPARE - Transportation     Lack of Transportation (Medical): No     Lack of Transportation (Non-Medical): No   Physical Activity: Not on file   Stress: Not on file   Social Connections: Not on file   Intimate Partner Violence: Not on file   Housing Stability: Low Risk  (2023)    Housing Stability Vital Sign     Unable to Pay for Housing in the Last Year: No     Number of Places Lived in the Last Year: 1     Unstable Housing in the Last Year: No     Social History     Tobacco Use   Smoking Status Former   Smokeless Tobacco Never        Temp (24hrs), Av.7 °F (37.1 °C), Min:98.3 °F (36.8 °C), Max:99.4 °F (37.4 °C)    BP (!) 129/55   Pulse 54   Temp 98.4 °F (36.9 °C) (Oral)   Resp 20   Ht 1.651 m (5' 5\")   Wt 90.7 kg (200 lb)   SpO2 96%   BMI 33.28 kg/m²     ROS: 12 point ROS obtained in details. Pertinent positives as mentioned in HPI,   otherwise negative    Physical Exam:    Gen: In general, this is a well nourished male in no acute distress  HEENT: Sclerae nonicteric. Oral mucous membranes moist. Dentition normal  Neck: Supple with midline trachea.   CV: RRR without murmur or rub appreciated.   Resp:Respirations are unlabored without use of accessory muscles. Lung fields B/L without wheezes or rhonchi.   Abd: Soft, nontender, nondistended.  Extrem: Darkish erythema right leg with superficial purulent blisters-no significant overlying warmth/tenderness, + palpable pedal pulse  Skin: Warm, no visible

## 2024-01-01 NOTE — PROGRESS NOTES
[facility]  Progress Note    Patient: Sonido Capellan Age: 78 y.o. : 1945 MR#: [id number] SSN: [id number]      Subjective/24-hour events:     No new complaints.  Afebrile overnight.    Assessment:   RLE cellulitis  DEV on CKD 4  DM 2  Coagulopathy secondary to coumadin, improved  Electrolyte abnormalities: hypomagnesemia, hypokalemia  Gross hematuria  Elevated LFTs  Class 1 obesity    Plan:   Per d/w ID today, patient will need IV ABX to complete course of therapy.  PICC line to be placed.  ? Need for continued coumadin tx - will see if this needs to be resume or not prior to discharge.  PT/OT re-evaluation.  Final disposition TBD.  Continue current medical management in interim.  Follow.    Objective:   VS: BP (!) 129/55   Pulse 54   Temp 98.4 °F (36.9 °C) (Oral)   Resp 20   Ht 1.651 m (5' 5\")   Wt 90.7 kg (200 lb)   SpO2 96%   BMI 33.28 kg/m²      Tmax/24hrs: Temp (24hrs), Av.7 °F (37.1 °C), Min:98.3 °F (36.8 °C), Max:99.4 °F (37.4 °C)    Intake/Output Summary (Last 24 hours) at 2024 0839  Last data filed at 2024 0625  Gross per 24 hour   Intake 920 ml   Output 1600 ml   Net -680 ml     Gen:  In NAD.  Lungs: Clear, no wheezes  Effort nonlabored.  CV: RRR.  Abdomen: Soft, NTTP.  Neuro:  Awake and alert, moves extremities spontaneously.      Current Facility-Administered Medications   Medication Dose Route Frequency    ceFEPIme (MAXIPIME) 2,000 mg in sodium chloride 0.9 % 100 mL IVPB (mini-bag)  2,000 mg IntraVENous Q24H    insulin glargine (LANTUS) injection vial 5 Units  5 Units SubCUTAneous Nightly    diphenhydrAMINE (BENADRYL) injection 25 mg  25 mg IntraVENous Q4H PRN    gabapentin (NEURONTIN) capsule 100 mg  100 mg Oral TID    ondansetron (ZOFRAN-ODT) disintegrating tablet 4 mg  4 mg Oral Q8H PRN    Or    ondansetron (ZOFRAN) injection 4 mg  4 mg IntraVENous Q6H PRN    polyethylene glycol (GLYCOLAX) packet 17 g  17 g Oral Daily PRN

## 2024-01-01 NOTE — PROGRESS NOTES
In Patient Progress note      Admit Date: 12/27/2023    Impression:     #1 Acute kidney injury on CKD 3b  Baseline difficult to determine as his last creatinine in epic was 1.5 in 2019, ,longstanding history of diabetes with complication with history of microalbuminuria and his etiology of CKD is likely underlying diabetic nephropathy. 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 intake   #4 allow systolic blood pressures of 130s to 150s in acute setting  #5 renally dose medications for EGFR of less than 30  #6 monitor electrolytes and renal functions daily    Discussed plan with primary team     Please call with questions     Amador Clay MD FASN  Cell [phone]  Pager: [phone]  Fax   [phone]       Subjective:     - No acute over night events.  - respiratory - stable  - hemodynamics - stable, no pressrs  - UOP-good  - Nutrition -ok    Objective:     BP (!) 129/55   Pulse 54   Temp 98.9 °F (37.2 °C) (Oral)   Resp 20   Ht 1.651 m (5' 5\")   Wt 90.7 kg (200 lb)   SpO2 95%   BMI 33.28 kg/m²       Intake/Output Summary (Last 24 hours) at 1/1/2024 1341  Last data filed at 1/1/2024 0625  Gross per 24 hour   Intake 620 ml   Output 1600 ml   Net -980 ml         Physical Exam:     Patient is in no apparent distress.   HEENT: Head is normocephalic and atraumatic.   Neck: no cervical lymphadenopathy or thyromegaly.   Lungs:

## 2024-01-02 LAB
BACTERIA SPEC CULT: NORMAL
GLUCOSE BLD STRIP.AUTO-MCNC: 134 MG/DL (ref 70–110)
GLUCOSE BLD STRIP.AUTO-MCNC: 179 MG/DL (ref 70–110)
GLUCOSE BLD STRIP.AUTO-MCNC: 214 MG/DL (ref 70–110)
GLUCOSE BLD STRIP.AUTO-MCNC: 235 MG/DL (ref 70–110)
SERVICE CMNT-IMP: NORMAL

## 2024-01-02 PROCEDURE — 97530 THERAPEUTIC ACTIVITIES: CPT

## 2024-01-02 PROCEDURE — 97116 GAIT TRAINING THERAPY: CPT

## 2024-01-02 PROCEDURE — 2580000003 HC RX 258: Performed by: INTERNAL MEDICINE

## 2024-01-02 PROCEDURE — 6370000000 HC RX 637 (ALT 250 FOR IP): Performed by: HOSPITALIST

## 2024-01-02 PROCEDURE — 94761 N-INVAS EAR/PLS OXIMETRY MLT: CPT

## 2024-01-02 PROCEDURE — 99231 SBSQ HOSP IP/OBS SF/LOW 25: CPT | Performed by: FAMILY MEDICINE

## 2024-01-02 PROCEDURE — 6360000002 HC RX W HCPCS: Performed by: INTERNAL MEDICINE

## 2024-01-02 PROCEDURE — 82962 GLUCOSE BLOOD TEST: CPT

## 2024-01-02 PROCEDURE — 6370000000 HC RX 637 (ALT 250 FOR IP): Performed by: STUDENT IN AN ORGANIZED HEALTH CARE EDUCATION/TRAINING PROGRAM

## 2024-01-02 PROCEDURE — 1100000000 HC RM PRIVATE

## 2024-01-02 PROCEDURE — 6370000000 HC RX 637 (ALT 250 FOR IP): Performed by: FAMILY MEDICINE

## 2024-01-02 RX ADMIN — TRAMADOL HYDROCHLORIDE 50 MG: 50 TABLET ORAL at 21:01

## 2024-01-02 RX ADMIN — GABAPENTIN 100 MG: 100 CAPSULE ORAL at 13:30

## 2024-01-02 RX ADMIN — GABAPENTIN 100 MG: 100 CAPSULE ORAL at 21:01

## 2024-01-02 RX ADMIN — CEFEPIME 2000 MG: 2 INJECTION, POWDER, FOR SOLUTION INTRAVENOUS at 21:01

## 2024-01-02 RX ADMIN — INSULIN LISPRO 1 UNITS: 100 INJECTION, SOLUTION INTRAVENOUS; SUBCUTANEOUS at 13:30

## 2024-01-02 RX ADMIN — TRAMADOL HYDROCHLORIDE 50 MG: 50 TABLET ORAL at 08:45

## 2024-01-02 RX ADMIN — TAMSULOSIN HYDROCHLORIDE 0.4 MG: 0.4 CAPSULE ORAL at 08:41

## 2024-01-02 RX ADMIN — INSULIN LISPRO 1 UNITS: 100 INJECTION, SOLUTION INTRAVENOUS; SUBCUTANEOUS at 17:57

## 2024-01-02 RX ADMIN — GABAPENTIN 100 MG: 100 CAPSULE ORAL at 08:41

## 2024-01-02 RX ADMIN — INSULIN GLARGINE 5 UNITS: 100 INJECTION, SOLUTION SUBCUTANEOUS at 21:00

## 2024-01-02 ASSESSMENT — PAIN DESCRIPTION - ORIENTATION
ORIENTATION: RIGHT
ORIENTATION: RIGHT

## 2024-01-02 ASSESSMENT — PAIN DESCRIPTION - LOCATION
LOCATION: SHOULDER
LOCATION: SHOULDER

## 2024-01-02 ASSESSMENT — PAIN SCALES - GENERAL
PAINLEVEL_OUTOF10: 0
PAINLEVEL_OUTOF10: 8
PAINLEVEL_OUTOF10: 0
PAINLEVEL_OUTOF10: 5
PAINLEVEL_OUTOF10: 0
PAINLEVEL_OUTOF10: 8
PAINLEVEL_OUTOF10: 7
PAINLEVEL_OUTOF10: 0

## 2024-01-02 ASSESSMENT — PAIN - FUNCTIONAL ASSESSMENT: PAIN_FUNCTIONAL_ASSESSMENT: ACTIVITIES ARE NOT PREVENTED

## 2024-01-02 ASSESSMENT — PAIN DESCRIPTION - ONSET: ONSET: GRADUAL

## 2024-01-02 ASSESSMENT — PAIN DESCRIPTION - PAIN TYPE: TYPE: ACUTE PAIN

## 2024-01-02 ASSESSMENT — PAIN DESCRIPTION - FREQUENCY: FREQUENCY: INTERMITTENT

## 2024-01-02 ASSESSMENT — PAIN DESCRIPTION - DESCRIPTORS: DESCRIPTORS: ACHING;THROBBING

## 2024-01-02 NOTE — PLAN OF CARE
Problem: Chronic Conditions and Co-morbidities  Goal: Patient's chronic conditions and co-morbidity symptoms are monitored and maintained or improved  Outcome: Progressing  Flowsheets (Taken 1/1/2024 0845 by Karo Vizcaino RN)  Care Plan - Patient's Chronic Conditions and Co-Morbidity Symptoms are Monitored and Maintained or Improved:   Monitor and assess patient's chronic conditions and comorbid symptoms for stability, deterioration, or improvement   Collaborate with multidisciplinary team to address chronic and comorbid conditions and prevent exacerbation or deterioration   Update acute care plan with appropriate goals if chronic or comorbid symptoms are exacerbated and prevent overall improvement and discharge     Problem: Safety - Adult  Goal: Free from fall injury  Outcome: Progressing  Flowsheets (Taken 12/28/2023 1602)  Free From Fall Injury: Based on caregiver fall risk screen, instruct family/caregiver to ask for assistance with transferring infant if caregiver noted to have fall risk factors     Problem: ABCDS Injury Assessment  Goal: Absence of physical injury  Outcome: Progressing  Flowsheets (Taken 12/28/2023 1602)  Absence of Physical Injury: Implement safety measures based on patient assessment     Problem: Skin/Tissue Integrity  Goal: Absence of new skin breakdown  Description: 1.  Monitor for areas of redness and/or skin breakdown  2.  Assess vascular access sites hourly  3.  Every 4-6 hours minimum:  Change oxygen saturation probe site  4.  Every 4-6 hours:  If on nasal continuous positive airway pressure, respiratory therapy assess nares and determine need for appliance change or resting period.  Outcome: Progressing     Problem: Pain  Goal: Verbalizes/displays adequate comfort level or baseline comfort level  Outcome: Progressing  Flowsheets (Taken 12/28/2023 1602)  Verbalizes/displays adequate comfort level or baseline comfort level:   Encourage patient to monitor pain and request  assistance   Assess pain using appropriate pain scale     Problem: Nutrition Deficit:  Goal: Optimize nutritional status  Outcome: Progressing  Flowsheets (Taken 12/29/2023 1234 by Maureen Licona RD)  Nutrient intake appropriate for improving, restoring, or maintaining nutritional needs:   Assess nutritional status and recommend course of action   Monitor oral intake, labs, and treatment plans   Recommend appropriate diets, oral nutritional supplements, and vitamin/mineral supplements     Problem: Physical Therapy - Adult  Goal: By Discharge: Performs mobility at highest level of function for planned discharge setting.  See evaluation for individualized goals.  Description: 1.  Patient will move from supine to sit and sit to supine  in bed with modified independence.    2.  Patient will transfer from bed to chair and chair to bed with supervision/set-up using the least restrictive device.  3.  Patient will perform sit to stand with supervision/set-up.  4.  Patient will ambulate with supervision/set-up for 50 feet with the least restrictive device.   5.  Patient will ascend/descend 3 stairs with 1-2 handrail(s) with supervision/set-up      PLOF, retired, recent SC use, I ADLS and activities, lives alone, drives  1/2/2024 1625 by Loli Duran, PT  Outcome: Progressing

## 2024-01-02 NOTE — PROGRESS NOTES
[facility]  Progress Note    Patient: Sonido Capellan Age: 78 y.o. : 1945 MR#: [id number] SSN: [id number]      Subjective/24-hour events:     No new issues/complaints.    Assessment:   RLE cellulitis  DEV on CKD 4  DM 2  Coagulopathy secondary to coumadin, improved  Electrolyte abnormalities: hypomagnesemia, hypokalemia  Gross hematuria  Elevated LFTs  Class 1 obesity    Plan:   PICC placed yesterday.  Plan for IV ABX at discharge.  PT/OT re-eval.  Final disposition TBD.  Supportive care in interim. Follow.      Objective:   VS: BP (!) 144/71   Pulse 55   Temp 98.4 °F (36.9 °C) (Oral)   Resp 20   Ht 1.651 m (5' 5\")   Wt 90.7 kg (200 lb)   SpO2 98%   BMI 33.28 kg/m²      Tmax/24hrs: Temp (24hrs), Av.5 °F (36.9 °C), Min:98.1 °F (36.7 °C), Max:98.9 °F (37.2 °C)    Intake/Output Summary (Last 24 hours) at 2024 0932  Last data filed at 2024 0505  Gross per 24 hour   Intake --   Output 1925 ml   Net -1925 ml       Gen:  In NAD.  Nontoxic-appearing.  Lungs: Clear, no wheezes  Effort nonlabored.  CV: RRR.  Abdomen: Soft, NTTP.  Neuro:  Awake and alert, moves extremities spontaneously.      Current Facility-Administered Medications   Medication Dose Route Frequency    traMADol (ULTRAM) tablet 50 mg  50 mg Oral Q6H PRN    ceFEPIme (MAXIPIME) 2,000 mg in sodium chloride 0.9 % 100 mL IVPB (mini-bag)  2,000 mg IntraVENous Q24H    insulin glargine (LANTUS) injection vial 5 Units  5 Units SubCUTAneous Nightly    diphenhydrAMINE (BENADRYL) injection 25 mg  25 mg IntraVENous Q4H PRN    gabapentin (NEURONTIN) capsule 100 mg  100 mg Oral TID    ondansetron (ZOFRAN-ODT) disintegrating tablet 4 mg  4 mg Oral Q8H PRN    Or    ondansetron (ZOFRAN) injection 4 mg  4 mg IntraVENous Q6H PRN    polyethylene glycol (GLYCOLAX) packet 17 g  17 g Oral Daily PRN    acetaminophen (TYLENOL) tablet 650 mg  650 mg Oral Q6H PRN    Or    acetaminophen (TYLENOL) suppository 650 mg  650 mg

## 2024-01-02 NOTE — PROGRESS NOTES
In Patient Progress note      Admit Date: 12/27/2023    Impression:   #1 Acute kidney injury on CKD 3b Baseline difficult to determine as his last creatinine in epic was 1.5 in 2019, ,longstanding history of diabetes with complication with history of microalbuminuria and his etiology of CKD is likely underlying diabetic nephropathy. 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 intake   #4 allow systolic blood pressures of 130s to 150s in acute setting  #5 renally dose medications for EGFR of less than 30  #6 monitor electrolytes and renal functions daily    Stable from renal stand point , needs renal f/u in 2-3 weeks after discharge   Noted possible plans for rehab   Discussed plan with primary team     Please call with questions,     Amador lCay MD FASN  Cell [phone]  Pager: [phone]  Fax   [phone]       Subjective:     - No acute over night events.  - respiratory - stable  - hemodynamics - stable, no pressrs  - UOP-good  - Nutrition -ok    Objective:     BP (!) 135/58   Pulse 53   Temp 99.1 °F (37.3 °C) (Oral)   Resp 18   Ht 1.651 m (5' 5\")   Wt 90.7 kg (200 lb)   SpO2 96%   BMI 33.28 kg/m²       Intake/Output Summary (Last 24 hours) at 1/2/2024 1316  Last data filed at 1/2/2024 0845  Gross per 24 hour   Intake 360 ml   Output 1400 ml   Net -1040 ml         Physical Exam:     Patient is in no apparent distress.

## 2024-01-02 NOTE — CARE COORDINATION
Case Management Assessment  Initial Evaluation    Date/Time of Evaluation: 1/2/2024 3:55 PM  Assessment Completed by: CHARITY Kaye    If patient is discharged prior to next notation, then this note serves as note for discharge by case management.    Patient Name: Sonido Capellan                   YOB: 1945  Diagnosis: Urinary retention [R33.9]  Cellulitis [L03.90]  DEV (acute kidney injury) (HCC) [N17.9]  Supratherapeutic INR [R79.1]  Cellulitis of right lower extremity [L03.115]  Deep vein thrombosis (DVT) of proximal vein of right lower extremity, unspecified chronicity (HCC) [I82.4Y1]  Acute on chronic congestive heart failure, unspecified heart failure type (HCC) [I50.9]                   Date / Time: 12/27/2023  3:32 PM    Patient Admission Status: Inpatient   Readmission Risk (Low < 19, Mod (19-27), High > 27): Readmission Risk Score: 15.5    Current PCP: Jefferson Chery MD  PCP verified by CM? (P) Yes (Dr. Nba Birmingham)    Chart Reviewed: Yes      History Provided by: (P) Patient  Patient Orientation: (P) Alert and Oriented, Person, Place, Situation, Self    Patient Cognition: (P) Alert    Hospitalization in the last 30 days (Readmission):  No    If yes, Readmission Assessment in  Navigator will be completed.    Advance Directives:      Code Status: Full Code   Patient's Primary Decision Maker is: (P) Legal Next of Kin      Discharge Planning:    Patient lives with: (P) Alone Type of Home: (P) House (Home Health)  Primary Care Giver: (P) Self  Patient Support Systems include: (P) Family Members   Current Financial resources: (P) Medicare  Current community resources:    Current services prior to admission: (P) None            Current DME:              Type of Home Care services:  (P) None    ADLS  Prior functional level: (P) Independent in ADLs/IADLs  Current functional level: (P) Independent in ADLs/IADLs    PT AM-PAC:   /24  OT AM-PAC: 21 /24    Family can provide assistance at DC:  (P) Yes  Would you like Case Management to discuss the discharge plan with any other family members/significant others, and if so, who?    Plans to Return to Present Housing: (P) Yes  Other Identified Issues/Barriers to RETURNING to current housing: PT dispo. home with [facility]  Potential Assistance needed at discharge: (P) Outpatient PT/OT, Home Care            Potential DME:    Patient expects to discharge to: (P) Skilled nursing facility  Plan for transportation at discharge:      Financial    Payor: MEDICARE / Plan: MEDICARE PART A AND B / Product Type: *No Product type* /     Does insurance require precert for SNF: No    Potential assistance Purchasing Medications: (P) No  Meds-to-Beds request:        [facility] - [place], [place] - [address] - P [phone] - F [phone]  [address] [address]  Phone: [phone] Fax: [phone]      Notes:    Factors facilitating achievement of predicted outcomes: Family support, Motivated, Cooperative, and Pleasant    Barriers to discharge: None    Additional Case Management Notes:     The Plan for Transition of Care is related to the following treatment goals of Urinary retention [R33.9]  Cellulitis [L03.90]  DEV (acute kidney injury) (HCC) [N17.9]  Supratherapeutic INR [R79.1]  Cellulitis of right lower extremity [L03.115]  Deep vein thrombosis (DVT) of proximal vein of right lower extremity, unspecified chronicity (HCC) [I82.4Y1]  Acute on chronic congestive heart failure, unspecified heart failure type (HCC) [I50.9]    IF APPLICABLE: The Patient and/or patient representative Sonido and his family were provided with a choice of provider and agrees with the discharge plan. Freedom of choice list with basic dialogue that supports the patient's individualized plan of care/goals and shares the quality data associated with the providers was provided to:     Patient Representative Name:       The Patient and/or Patient Representative Agree

## 2024-01-02 NOTE — PROGRESS NOTES
Infectious Disease progress Note        Reason: Right leg cellulitis, penicillin allergy    Current abx Prior abx   Levofloxacin, vancomycin since 12/28-12/29  Cefepime since 12/29      Lines:   PICC line right upper extremity since 1/1/2024    Assessment :  78-year-old male with a known history of hypertension, type 2 diabetes, CKD 3 presented to the emergency room on 12/27/2023 secondary to blood in urine,  right leg redness/swelling.     Clinical presentation consistent with partially treated right leg cellulitis, superficial right leg abscesses, hematuria ( secondary to anticoagulation)    Wound cx RLE 12/28- pseudomonas, yeast, skin kemi.   Yeast and wound culture likely colonizer    Acute on chronic kidney injury-some improvement in creatinine noted today.  Nephrology follow-up appreciated    Antibiotic management complicated due to history of allergy to penicillin, cephalexin.  Anaphylaxis to penicillin in childhood, allergic reaction to amoxicillin in the past, itching/rash with cephalexin.  No life-threatening allergy to cephalexin    Recommendations:    continue cefepime till 1/11/24- no oral antibiotic options available to treat pseudomonas RLE wound  Place midline for outpatient IV antibiotics  Continue local wound care right lower extremity  Follow-up nephrology recommendations  Await final disposition plan Home versus SNF.  Will need to make arrangements for home health/home IV antibiotics if plans to discharge patient home    Home health orders on chart (click on \"chart review, other orders, IP home health)      Above plan was discussed in details with patient, dr Wen. Please call me if any further questions or concerns. Will continue to participate in the care of this patient.      HPI:      Patient denies any fever, chills throughout this time.  Feels better.  Improved pain right leg  Past Medical History:   Diagnosis Date    Allergic rhinitis     Benign paroxysmal positional vertigo      Dermatophytosis     Hypertrophy (benign) of prostate     Phlebitis and thrombophlebitis of other deep vessels of unspecified lower extremity (HCC)     Type II diabetes mellitus (HCC)        Past Surgical History:   Procedure Laterality Date    COLONOSCOPY      polyps       [unfilled]    Current Facility-Administered Medications   Medication Dose Route Frequency    traMADol (ULTRAM) tablet 50 mg  50 mg Oral Q6H PRN    ceFEPIme (MAXIPIME) 2,000 mg in sodium chloride 0.9 % 100 mL IVPB (mini-bag)  2,000 mg IntraVENous Q24H    insulin glargine (LANTUS) injection vial 5 Units  5 Units SubCUTAneous Nightly    diphenhydrAMINE (BENADRYL) injection 25 mg  25 mg IntraVENous Q4H PRN    gabapentin (NEURONTIN) capsule 100 mg  100 mg Oral TID    ondansetron (ZOFRAN-ODT) disintegrating tablet 4 mg  4 mg Oral Q8H PRN    Or    ondansetron (ZOFRAN) injection 4 mg  4 mg IntraVENous Q6H PRN    polyethylene glycol (GLYCOLAX) packet 17 g  17 g Oral Daily PRN    acetaminophen (TYLENOL) tablet 650 mg  650 mg Oral Q6H PRN    Or    acetaminophen (TYLENOL) suppository 650 mg  650 mg Rectal Q6H PRN    glucose chewable tablet 16 g  4 tablet Oral PRN    dextrose bolus 10% 125 mL  125 mL IntraVENous PRN    Or    dextrose bolus 10% 250 mL  250 mL IntraVENous PRN    glucagon injection 1 mg  1 mg SubCUTAneous PRN    dextrose 10 % infusion   IntraVENous Continuous PRN    insulin lispro (HUMALOG) injection vial 0-4 Units  0-4 Units SubCUTAneous TID WC    insulin lispro (HUMALOG) injection vial 0-4 Units  0-4 Units SubCUTAneous Nightly    nadolol (CORGARD) tablet 20 mg  20 mg Oral Daily    tamsulosin (FLOMAX) capsule 0.4 mg  0.4 mg Oral Daily       Allergies: Ampicillin, Diphenoxylate-atropine, Cephalexin, and Penicillin v potassium    Family History   Problem Relation Age of Onset    Cancer Mother     Heart Disease Father      Social History     Socioeconomic History    Marital status: Single     Spouse name: Not on file    Number of children: Not

## 2024-01-02 NOTE — HOME CARE
Just received call from  (Priscilla) regarding HH orders for IV abx; IV abx orders, demographics,H&P,ID consult notes and PICC report were all faxed to [facility] to begin the process to obtain cost of IV abx at home ; spoke to patient,Verified demographics and explained HH services to patient ; [facility] will continue to follow and await  IV abx cost from Infusion company. DEMIAN FITCH.     16:40 PM: Confirmed from Elfego at [facility] that IV abx orders ,demographics and all clinicals have been received ,she states Insurance will have to be verified in AM due to receiving IV abx orders from   late in the day ;  Spoke to patients nephew ( Gio ) regarding  caregiver needed for IV abx and teaching,he states he would be willing to help with learning how to administer IV abx for patient and  also states that his niece ( Maureen) also would be willing to help; [facility] will continue to follow up in AM once cost has been obtained and verified; DEMIAN FITCH.

## 2024-01-02 NOTE — SIGNIFICANT EVENT
Patient started complaining of right shoulder pain. He states he has a history of rotater cuff tear. He was pulling himself up in bed in the afternoon. Since then, he's unable to move the arm on its own. He describes a tingling sensation in the shoulder. Patient had tenderness in right shoulder. Pulses were palpable. Patient had a PICC line placed during the day.     -Ordered xray right shoulder  -Ordered tramadol 50mg q6h prn      Glenna Beaver DO, MBA, MS  [facility]  Hospitalist

## 2024-01-02 NOTE — PLAN OF CARE
Problem: Physical Therapy - Adult  Goal: By Discharge: Performs mobility at highest level of function for planned discharge setting.  See evaluation for individualized goals.  Description: 1.  Patient will move from supine to sit and sit to supine  in bed with modified independence.    2.  Patient will transfer from bed to chair and chair to bed with supervision/set-up using the least restrictive device.  3.  Patient will perform sit to stand with supervision/set-up.  4.  Patient will ambulate with supervision/set-up for 50 feet with the least restrictive device.   5.  Patient will ascend/descend 3 stairs with 1-2 handrail(s) with supervision/set-up      PLOF, retired, recent SC use, I ADLS and activities, lives alone, drives  Outcome: Progressing     PHYSICAL THERAPY TREATMENT    Patient: Sonido Capellan (78 y.o. male)  Date: 1/2/2024  Diagnosis: Urinary retention [R33.9]  Cellulitis [L03.90]  DEV (acute kidney injury) (HCC) [N17.9]  Supratherapeutic INR [R79.1]  Cellulitis of right lower extremity [L03.115]  Deep vein thrombosis (DVT) of proximal vein of right lower extremity, unspecified chronicity (HCC) [I82.4Y1]  Acute on chronic congestive heart failure, unspecified heart failure type (HCC) [I50.9] Cellulitis      Precautions: Fall Risk, General Precautions     ASSESSMENT:  Pt in bed and agreeable, reports pain in RLE has been improving however does continue to endorse R shoulder discomfort. Pt mobilizes at overall SBA level this date. Needed increased support with the use of RW this date compared to the cane. Tolerated approx 80 ft of gait with slow speed and decreased  step clearance. Cues to attend to task 2/2 being talkative. Will continue to follow. Left sittng EOB with good balance and all needs met.     Progression toward goals:   [x]      Improving appropriately and progressing toward goals  []      Improving slowly and progressing toward goals  []      Not making progress toward goals and plan of care  will be adjusted     PLAN:  Patient continues to benefit from skilled intervention to address the above impairments.  Continue treatment per established plan of care.    Further Equipment Recommendations for Discharge: [facility]: AM-PAC Inpatient Mobility Raw Score : 18      Current research shows that an AM-PAC score of 18 (14 without stairs) or greater is associated with a discharge to the patient's home setting. Based on an AM-PAC score and their current functional mobility deficits, it is recommended that the patient have 2-3 sessions per week of Physical Therapy at d/c to increase the patient's independence.    This AMPA score should be considered in conjunction with interdisciplinary team recommendations to determine the most appropriate discharge setting. Patient's social support, diagnosis, medical stability, and prior level of function should also be taken into consideration.     SUBJECTIVE:   Patient stated, \"I can get around.\"    OBJECTIVE DATA SUMMARY:   Critical Behavior:  Orientation  Orientation Level: Oriented to place;Oriented to person       Functional Mobility Training:  Bed Mobility:  Bed Mobility Training  Rolling: Stand-by assistance  Supine to Sit: Stand-by assistance  Sit to Supine: Stand-by assistance  Scooting: Stand-by assistance  Transfers:  Transfer Training  Transfer Training: Yes  Sit to Stand: Stand-by assistance  Stand to Sit: Stand-by assistance  Balance:  Balance  Sitting: Intact  Standing: With support  Standing - Static: Good  Standing - Dynamic: Good     Ambulation/Gait Training:     Gait  Overall Level of Assistance: Stand-by assistance  Distance (ft): 80 Feet  Assistive Device: Walker, rolling  Interventions: Safety awareness training;Verbal cues;Manual cues  Speed/Khalida: Slow  Step Length: Left shortened;Right shortened  Stance: Left increased;Right decreased  Gait Abnormalities: Decreased step clearance    Pain:  Pain level pre-treatment: 0/10  Pain level

## 2024-01-02 NOTE — PROGRESS NOTES
Physician Progress Note      PATIENT:               JOSE ENRIQUE COOPER  CSN #:                  [id number]  :                       1945  ADMIT DATE:       2023 3:32 PM  DISCH DATE:  RESPONDING  PROVIDER #:        Julien Wen MD          QUERY TEXT:    Dear Hospitalist    Pt admitted with right leg cellulitis. Pt noted to have DM type 2. If   possible, please document in progress notes and discharge summary the   relationship, if any, between cellulitis and DM.    The medical record reflects the following:  Risk Factors: type 2 diabetes  Clinical Indicators: H&P note noted as Cellulitis of right lower extremity,   History of type 2 diabetes, glu 234H,  Treatment: continue broad-spectrum IV levofloxacin, vancomycin, ID consulted,   local wound care., insulin sliding scale, monitor blood sugars    Thank  you,   Jane De La Cruz RN   CCDS  Options provided:  -- right lower extremity cellulitis associated with Diabetes  -- right lower extremity cellulitis unrelated to Diabetes  -- Other - I will add my own diagnosis  -- Disagree - Not applicable / Not valid  -- Disagree - Clinically unable to determine / Unknown  -- Refer to Clinical Documentation Reviewer    PROVIDER RESPONSE TEXT:    right lower extremity cellulitis associated with Diabetes.    Query created by: Devi De La Cruz on 2023 11:26 AM      Electronically signed by:  Julien Wen MD 2024 3:22 PM

## 2024-01-03 ENCOUNTER — HOME HEALTH ADMISSION (OUTPATIENT)
Age: 79
End: 2024-01-03
Payer: MEDICARE

## 2024-01-03 VITALS
HEART RATE: 56 BPM | RESPIRATION RATE: 20 BRPM | WEIGHT: 200 LBS | TEMPERATURE: 98.7 F | BODY MASS INDEX: 33.32 KG/M2 | OXYGEN SATURATION: 96 % | SYSTOLIC BLOOD PRESSURE: 147 MMHG | HEIGHT: 65 IN | DIASTOLIC BLOOD PRESSURE: 65 MMHG

## 2024-01-03 LAB
BACTERIA SPEC CULT: NORMAL
GLUCOSE BLD STRIP.AUTO-MCNC: 147 MG/DL (ref 70–110)
GLUCOSE BLD STRIP.AUTO-MCNC: 193 MG/DL (ref 70–110)
GLUCOSE BLD STRIP.AUTO-MCNC: 198 MG/DL (ref 70–110)
SERVICE CMNT-IMP: NORMAL

## 2024-01-03 PROCEDURE — 97535 SELF CARE MNGMENT TRAINING: CPT

## 2024-01-03 PROCEDURE — 97530 THERAPEUTIC ACTIVITIES: CPT

## 2024-01-03 PROCEDURE — 82962 GLUCOSE BLOOD TEST: CPT

## 2024-01-03 PROCEDURE — 6370000000 HC RX 637 (ALT 250 FOR IP): Performed by: HOSPITALIST

## 2024-01-03 PROCEDURE — 2580000003 HC RX 258: Performed by: INTERNAL MEDICINE

## 2024-01-03 PROCEDURE — 6360000002 HC RX W HCPCS: Performed by: INTERNAL MEDICINE

## 2024-01-03 PROCEDURE — 6370000000 HC RX 637 (ALT 250 FOR IP): Performed by: STUDENT IN AN ORGANIZED HEALTH CARE EDUCATION/TRAINING PROGRAM

## 2024-01-03 PROCEDURE — 99239 HOSP IP/OBS DSCHRG MGMT >30: CPT | Performed by: FAMILY MEDICINE

## 2024-01-03 PROCEDURE — 6370000000 HC RX 637 (ALT 250 FOR IP): Performed by: FAMILY MEDICINE

## 2024-01-03 RX ORDER — SIMVASTATIN 40 MG
40 TABLET ORAL NIGHTLY
COMMUNITY

## 2024-01-03 RX ORDER — HYDROCHLOROTHIAZIDE 25 MG/1
25 TABLET ORAL EVERY MORNING
Qty: 30 TABLET | Refills: 0
Start: 2024-01-03 | End: 2024-01-03 | Stop reason: HOSPADM

## 2024-01-03 RX ORDER — GABAPENTIN 100 MG/1
100 CAPSULE ORAL 3 TIMES DAILY
Qty: 90 CAPSULE | Refills: 0 | Status: SHIPPED | OUTPATIENT
Start: 2024-01-03 | End: 2024-02-02

## 2024-01-03 RX ORDER — CAPTOPRIL 12.5 MG/1
6.25 TABLET ORAL 2 TIMES DAILY
Status: DISCONTINUED | OUTPATIENT
Start: 2024-01-03 | End: 2024-01-03 | Stop reason: HOSPADM

## 2024-01-03 RX ORDER — CAPTOPRIL 12.5 MG/1
6.25 TABLET ORAL 2 TIMES DAILY
Qty: 30 TABLET | Refills: 0 | Status: SHIPPED | OUTPATIENT
Start: 2024-01-03

## 2024-01-03 RX ORDER — TRAMADOL HYDROCHLORIDE 50 MG/1
50 TABLET ORAL EVERY 6 HOURS PRN
Qty: 12 TABLET | Refills: 0 | Status: SHIPPED | OUTPATIENT
Start: 2024-01-03 | End: 2024-01-06

## 2024-01-03 RX ADMIN — CEFEPIME 2000 MG: 2 INJECTION, POWDER, FOR SOLUTION INTRAVENOUS at 14:35

## 2024-01-03 RX ADMIN — GABAPENTIN 100 MG: 100 CAPSULE ORAL at 09:34

## 2024-01-03 RX ADMIN — TRAMADOL HYDROCHLORIDE 50 MG: 50 TABLET ORAL at 14:33

## 2024-01-03 RX ADMIN — TAMSULOSIN HYDROCHLORIDE 0.4 MG: 0.4 CAPSULE ORAL at 09:34

## 2024-01-03 RX ADMIN — TRAMADOL HYDROCHLORIDE 50 MG: 50 TABLET ORAL at 09:34

## 2024-01-03 RX ADMIN — GABAPENTIN 100 MG: 100 CAPSULE ORAL at 14:33

## 2024-01-03 ASSESSMENT — PAIN SCALES - GENERAL
PAINLEVEL_OUTOF10: 7
PAINLEVEL_OUTOF10: 5
PAINLEVEL_OUTOF10: 0
PAINLEVEL_OUTOF10: 5
PAINLEVEL_OUTOF10: 0
PAINLEVEL_OUTOF10: 7

## 2024-01-03 ASSESSMENT — PAIN DESCRIPTION - DESCRIPTORS: DESCRIPTORS: BURNING

## 2024-01-03 ASSESSMENT — PAIN DESCRIPTION - LOCATION
LOCATION: SHOULDER
LOCATION: LEG;SHOULDER

## 2024-01-03 ASSESSMENT — PAIN DESCRIPTION - ORIENTATION: ORIENTATION: RIGHT

## 2024-01-03 NOTE — HOME CARE
Received call from [facility] ( Elfego), states patient's IV abx cost $5.00 for duration of Abx, patient informed of cost,states he can afford $5.00,[facility] rep ( Hung) will be coming to patients room to begin IV abx teaching/education to patient and his nephew ( Gio); notified Dr Wen that patient all set for discharge today; [facility] will continue to follow. DEMIAN FITCH.    14:35 pm: Discharge order noted for today, [facility] rep (Hung) came to patients room to instruct patient ,his nephew(Gio),patients' sister ( Bertha) and niece ( Maureen) on IV abx administration at home ;Notified Elfego at [facility] of discharge order, she states patients IV abx and supplies will be delivered to patients home this evening on 6:30 pm delivery ; DME: RW will be provided to patient by  prior to discharge today ;HH referral updated for SN,PT,OT and processed to [facility] central Intake and scheduling for start of care  for Thursday 1/4/24.DEMIAN FITCH.

## 2024-01-03 NOTE — DISCHARGE INSTRUCTIONS
DISCHARGE SUMMARY from Nurse    PATIENT INSTRUCTIONS:    After general anesthesia or intravenous sedation, for 24 hours or while taking prescription Narcotics:  Limit your activities  Do not drive and operate hazardous machinery  Do not make important personal or business decisions  Do  not drink alcoholic beverages  If you have not urinated within 8 hours after discharge, please contact your surgeon on call.    Report the following to your surgeon:  Excessive pain, swelling, redness or odor of or around the surgical area  Temperature over 100.5  Nausea and vomiting lasting longer than 4 hours or if unable to take medications  Any signs of decreased circulation or nerve impairment to extremity: change in color, persistent  numbness, tingling, coldness or increase pain  Any questions    What to do at Home:  Recommended activity: activity as tolerated, with assistance    If you experience any of the following symptoms chest pain, shortness of breath, fever greater than 100.5, nausea, vomiting, pain unrelieved with medication, please follow up with Dr. Clay in 3 weeks.    *  Please give a list of your current medications to your Primary Care Provider.    *  Please update this list whenever your medications are discontinued, doses are      changed, or new medications (including over-the-counter products) are added.    *  Please carry medication information at all times in case of emergency situations.    These are general instructions for a healthy lifestyle:    No smoking/ No tobacco products/ Avoid exposure to second hand smoke  Surgeon General's Warning:  Quitting smoking now greatly reduces serious risk to your health.    Obesity, smoking, and sedentary lifestyle greatly increases your risk for illness    A healthy diet, regular physical exercise & weight monitoring are important for maintaining a healthy lifestyle    You may be retaining fluid if you have a history of heart failure or if you experience any of

## 2024-01-03 NOTE — PROGRESS NOTES
In Patient Progress note      Admit Date: 12/27/2023    Impression:   #1 Acute kidney injury on CKD 3b Baseline difficult to determine as his last creatinine in epic was 1.5 in 2019, ,longstanding history of diabetes with complication with history of microalbuminuria and his etiology of CKD is likely underlying diabetic nephropathy. 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 intake   #4 allow systolic blood pressures of 130s to 150s in acute setting  #5 renally dose medications for EGFR of less than 30  #6 monitor electrolytes and renal functions daily    Restart low dose captopril for proteinuria diabetic kidney disease and CKD3b, Stable from renal stand point , needs renal f/u in 2-3 weeks after discharge     Discussed plan with primary team     Please call with questions,     Amador Clay MD [facility]  Cell [phone]  Pager: [phone]  Fax   [phone]       Subjective:     - No acute over night events.  - respiratory - stable  - hemodynamics - stable, no pressrs  - UOP-good  - Nutrition -ok    Objective:     BP (!) 129/54   Pulse 57   Temp 98.2 °F (36.8 °C) (Oral)   Resp 20   Ht 1.651 m (5' 5\")   Wt 90.7 kg (200 lb)   SpO2 94%   BMI 33.28 kg/m²       Intake/Output Summary (Last 24 hours) at 1/3/2024 1243  Last data filed at 1/3/2024 0838  Gross per 24 hour   Intake

## 2024-01-03 NOTE — PLAN OF CARE
Occupational Therapy Goals:  Initiated 12/30/2023 to be met within 7-10 days.    1.  Patient will maneuver on and off commode with mod I  2.  Patient will demonstrate 4/5 UB strength in preparation for his efficient completion of his self care routine  3.  Patient will stand at the sink for grooming with modified independence  4. Patient will perform LB bathing and dressing with modified independence      PLOF: this patient lives with his 2 dogs and reports he was independent with his self care. He lives in a one story home    OCCUPATIONAL THERAPY TREATMENT    Patient: Sonido Capellan (78 y.o. male)  Date: 1/3/2024  Diagnosis: Urinary retention [R33.9]  Cellulitis [L03.90]  DEV (acute kidney injury) (HCC) [N17.9]  Supratherapeutic INR [R79.1]  Cellulitis of right lower extremity [L03.115]  Deep vein thrombosis (DVT) of proximal vein of right lower extremity, unspecified chronicity (HCC) [I82.4Y1]  Acute on chronic congestive heart failure, unspecified heart failure type (HCC) [I50.9] Cellulitis      Precautions: Fall Risk, General Precautions,  ,  ,  ,  ,  ,  ,      Chart, occupational therapy assessment, plan of care, and goals were reviewed.  ASSESSMENT:  Pt presented supine in bed upon entry and agreeable to work with OT. Pt easily distracted requiring mod cueing for redirection to task at hand. Pt transitioned to EOB SBA in prep for functional tasks. Pt able to debora his socks SBA w/ leg cross method while sitting, no LOB noted. STS transfer SBA with RW requiring vc's for proper hand placement to ensure safe transition. Pt maneuvered into BR ~ 15 ft CGA with RW and stood at sink side. Pt performed hand hygiene ~ 2 mins CGA with 1 UE support, no LOB noted. Pt returned back to supine SBA and positioned for comfort. He was left with HOB elevated, bed alarm active, and all needs within reach. RN made aware.  Progression toward goals:  []          Improving appropriately and progressing toward goals  [x]           during this treatment.  After treatment:   []  Patient left in no apparent distress sitting up in chair  [x]  Patient left in no apparent distress in bed  [x]  Call bell left within reach  [x]  Nursing notified  []  Caregiver present  [x]  Bed alarm activated    COMMUNICATION/EDUCATION:   Patient Education  Education Given To: Patient  Education Provided: Role of Therapy;Plan of Care;ADL Adaptive Strategies;Transfer Training;Energy Conservation;Fall Prevention Strategies  Education Method: Teach Back;Verbal;Demonstration  Barriers to Learning: None  Education Outcome: Continued education needed;Verbalized understanding      Thank you for this referral.  FRANCY Taylor  Minutes: 24

## 2024-01-03 NOTE — CARE COORDINATION
Patient chose to go home with [facility].   [facility] has to check the cost of the abx, and if patient is in agreement to the cost, they will be able to service him.       SCOTT Kaye

## 2024-01-03 NOTE — PROGRESS NOTES
Pt's family, Nephew Leobardo and his wife at bedside. PICC line teaching completed with returned demonstration. Nephew states he was a medic in .

## 2024-01-04 ENCOUNTER — HOME CARE VISIT (OUTPATIENT)
Age: 79
End: 2024-01-04

## 2024-01-04 VITALS
DIASTOLIC BLOOD PRESSURE: 66 MMHG | RESPIRATION RATE: 18 BRPM | SYSTOLIC BLOOD PRESSURE: 151 MMHG | HEART RATE: 59 BPM | TEMPERATURE: 98 F

## 2024-01-04 PROCEDURE — 0221000100 HH NO PAY CLAIM PROCEDURE

## 2024-01-04 PROCEDURE — G0299 HHS/HOSPICE OF RN EA 15 MIN: HCPCS

## 2024-01-04 ASSESSMENT — ENCOUNTER SYMPTOMS
DYSPNEA ACTIVITY LEVEL: AFTER AMBULATING MORE THAN 20 FT
PAIN LOCATION - PAIN QUALITY: DULL

## 2024-01-04 NOTE — HOME HEALTH
opioid/narcotics performed (specify): see intervention    MD Arango notified of any discrepancies/look a like medications/medication interactions ASA and comadin.     Home health supplies by type and quantity ordered/delivered this visit include: yes  Patient education provided this visit to include: medication teaching   safety and fall prevention education   nutrition education   skin care and assessment   pain management.      Patient level of understanding of education provided: Patient/CG educated on use of heat / ice as non-pharmacological form of pain treatment.         Sharps Education Provided:Clinician instructed patient/CG on proper disposal of sharps: Containers should be made of hard plastic, be puncture-resistant and leakproof, such as a laundry detergent or bleach bottle.  When the container is ¾ full, it should be sealed with tape and labeled DO NOT RECYCLE prior to discarding in the regular trash.        Patient response to procedure performed:  tolerated well with no pain noted     Home exercise program/Homework provided: pt/ caregiver will continue to monitor wound for s/sx of infection pt will continue on abt therapy as ordered     Pt/Caregiver instructed on plan of care and are agreeable to plan of care at this time.      Physician MD Arango notified of patient admission to home health and plan of care including anticipated frequency of 2 wk 2, 1 wk 2 and treatments/interventions/modalities of Right leg cellulitis/DM.    Discharge planning discussed with patient and caregiver.  Discharge planning as follows: when all nursing goals are met .  Pt/Caregiver did verbalize understanding of discharge planning.     Next MD appointment TBD (date) with Dr Nba PAGE/NP/PA.  Patient/caregiver encouraged/instructed to keep appointment as lack of follow through with physician appointment could result in discontinuation of home care services for non-compliance.

## 2024-01-04 NOTE — PLAN OF CARE
Discharge instructions given to patient and nephew(Leobardo) including medications education, follow up appointment, and home health care informations. Pt and nephew verbalized understanding. PICC line still clean, dry and intact. Curos tops and alcohol pads given to patient. Pt wants to walk downstairs with walker. Pt is stable at the time of discharge.   Problem: Chronic Conditions and Co-morbidities  Goal: Patient's chronic conditions and co-morbidity symptoms are monitored and maintained or improved  Outcome: Adequate for Discharge     Problem: Safety - Adult  Goal: Free from fall injury  Outcome: Adequate for Discharge     Problem: ABCDS Injury Assessment  Goal: Absence of physical injury  Outcome: Adequate for Discharge     Problem: Skin/Tissue Integrity  Goal: Absence of new skin breakdown  Description: 1.  Monitor for areas of redness and/or skin breakdown  2.  Assess vascular access sites hourly  3.  Every 4-6 hours minimum:  Change oxygen saturation probe site  4.  Every 4-6 hours:  If on nasal continuous positive airway pressure, respiratory therapy assess nares and determine need for appliance change or resting period.  Outcome: Adequate for Discharge     Problem: Pain  Goal: Verbalizes/displays adequate comfort level or baseline comfort level  Outcome: Adequate for Discharge     Problem: Nutrition Deficit:  Goal: Optimize nutritional status  Outcome: Adequate for Discharge

## 2024-01-04 NOTE — PROGRESS NOTES
Infectious Disease progress Note        Reason: Right leg cellulitis, penicillin allergy    Current abx Prior abx   Levofloxacin, vancomycin since 12/28-12/29  Cefepime since 12/29      Lines:   PICC line right upper extremity since 1/1/2024    Assessment :  78-year-old male with a known history of hypertension, type 2 diabetes, CKD 3 presented to the emergency room on 12/27/2023 secondary to blood in urine,  right leg redness/swelling.     Clinical presentation consistent with partially treated right leg cellulitis, superficial right leg abscesses, hematuria ( secondary to anticoagulation)    Wound cx RLE 12/28- pseudomonas, yeast, skin kemi.   Yeast and wound culture likely colonizer    Acute on chronic kidney injury-some improvement in creatinine noted today.  Nephrology follow-up appreciated    Antibiotic management complicated due to history of allergy to penicillin, cephalexin.  Anaphylaxis to penicillin in childhood, allergic reaction to amoxicillin in the past, itching/rash with cephalexin.  No life-threatening allergy to cephalexin    Recommendations:    continue cefepime till 1/11/24- no oral antibiotic options available to treat pseudomonas RLE wound  Continue local wound care right lower extremity  Follow-up nephrology recommendations  Will need to make arrangements for home health/home IV antibiotics if plans to discharge patient home    Home health orders on chart (click on \"chart review, other orders, IP home health)      Above plan was discussed in details with patient, dr Wen, . Please call me if any further questions or concerns. Will continue to participate in the care of this patient.      HPI:      Patient denies any fever, chills throughout this time.  Feels better.  Improved pain right leg  Past Medical History:   Diagnosis Date    Allergic rhinitis     Benign paroxysmal positional vertigo     Dermatophytosis     Hypertrophy (benign) of prostate     Phlebitis and

## 2024-01-05 ENCOUNTER — HOME CARE VISIT (OUTPATIENT)
Age: 79
End: 2024-01-05

## 2024-01-05 VITALS
HEART RATE: 58 BPM | RESPIRATION RATE: 18 BRPM | TEMPERATURE: 97.8 F | DIASTOLIC BLOOD PRESSURE: 65 MMHG | OXYGEN SATURATION: 98 % | SYSTOLIC BLOOD PRESSURE: 135 MMHG

## 2024-01-05 PROCEDURE — G0151 HHCP-SERV OF PT,EA 15 MIN: HCPCS

## 2024-01-05 NOTE — HOME HEALTH
Skilled Reason for admission/summary of clinical condition: adm thru ED for right leg cellulitis.   HHPT medically necessary to address  dx and clinical findings :decreased LE strength, impaired gait, no HEP, stairs, LE swelling, dep in transfers, decreased endurance, decreased balance and decreased safety in order to improve functional mobility, quality of life and decrease burden of care.  PMHX: Allergic rhinitis • Benign paroxysmal positional vertigo • Dermatophytosis • Hypertrophy (benign) of prostate • Phlebitis and thrombophlebitis of other deep vessels of unspecified lower extremity • Type II diabetes mellitus  Caregiver involvement: lives alone, family available prn and assists with meds, meals, functional mobility, transport to MD appts.             Lives in one story house with 4 stairs to enter with handrails   Medications reconciled and all medications are  available in the home this visit.   PLOF: amb with cane , Indep with ADLs,   ROM: at initial assessment:   BLE wnl   Strength: at initial assessment:   L hip flexion 3-/5, L knee flexion 3-/5,  L knee extension 3-/5,    R hip flexion 2-/5, R knee flexion 2/5,  R knee extension 2/5,    Bed mobility: n/a pt sleeps in recliner X several years   Transfers: pt transferred sit to stand with cga.  VC for scooting to edge of seat and placement of BUE in order for ease with anterior weight shift.   Gait training: PT instructed pt in amb 35ft with Cane cga/sba vc to improve with gait posture  Gait deficits noted: decreased R foot clearance, decreased heel-toe gait, amb with step to pattern  Balance: Tinetti 09/28  indicates increased fall risk, patient with decreased balance with directional change, educated pt to use rw for amb in the house  Patient education provided this visit:  BLE strengthening HEP, transfer/gait training, fall prevention  Patient/caregiver degree of understanding:  Pt verbalized understanding role of PT,  able to teach back with vc  BLE

## 2024-01-05 NOTE — PROGRESS NOTES
Physician Progress Note      PATIENT:               JOSE ENRIQUE COOPER  [facility] #:                  [id number]  :                       1945  ADMIT DATE:       2023 3:32 PM  DISCH DATE:        1/3/2024 7:42 PM  RESPONDING  PROVIDER #:        Julien Wen MD          QUERY TEXT:    Dear Hospitalist      Patient admitted with right leg cellulitis.  Noted documentation of Acute   Kidney Injury in H&P on .  In order to support the diagnosis of DEV,   please include additional clinical indicators in your documentation.? Or   please document if the diagnosis of DEV has been ruled out after further   study.    The medical record reflects the following:  Risk Factors: CKD 4, obesity;  nephrology consult noted as acute kidney injury   on CKD 4, versus progression of underlying CKD.  Baseline difficult to   determine as his last creatinine in epic was 1.5 in 2019, now presents with a   creatinine of 2.7.  Treatment: IVF, nephrology consult.    Defined by Kidney Disease Improving Global Outcomes (KDIGO) clinical practice   guideline for acute kidney injury:  -Increase in SCr by greater than or equal to 0.3 mg/dl within 48 hours; or  -Increase or decrease in SCr to greater than or equal to 1.5 times baseline,   which is known or presumed to have occurred within the prior 7 days; or  -Urine volume < 0.5ml/kg/h for 6 hours.    Thank you,   Jane De La Cruz RN   CCDS  Options provided:  -- Acute kidney injury evidenced by, Please document evidence as well as a   numerical baseline creatinine, if known.  -- CKD 4 without DEV  -- Other - I will add my own diagnosis  -- Disagree - Not applicable / Not valid  -- Disagree - Clinically unable to determine / Unknown  -- Refer to Clinical Documentation Reviewer    PROVIDER RESPONSE TEXT:    This Patient has CKD 4 without DEV.    Query created by: Devi De La Cruz on 2023 11:32 AM      Electronically signed by:  Julien Wen MD 2024 8:04 AM

## 2024-01-06 ENCOUNTER — HOME CARE VISIT (OUTPATIENT)
Age: 79
End: 2024-01-06

## 2024-01-07 ASSESSMENT — ENCOUNTER SYMPTOMS: DYSPNEA ACTIVITY LEVEL: AFTER AMBULATING 10 - 20 FT

## 2024-01-08 ENCOUNTER — HOME CARE VISIT (OUTPATIENT)
Age: 79
End: 2024-01-08

## 2024-01-08 VITALS
SYSTOLIC BLOOD PRESSURE: 158 MMHG | OXYGEN SATURATION: 98 % | RESPIRATION RATE: 18 BRPM | DIASTOLIC BLOOD PRESSURE: 64 MMHG | HEART RATE: 66 BPM | TEMPERATURE: 97.4 F

## 2024-01-08 PROCEDURE — G0299 HHS/HOSPICE OF RN EA 15 MIN: HCPCS

## 2024-01-08 NOTE — HOME HEALTH
Skilled reason for visit: PICC line dressing change, wound care    Caregiver involvement: family assist with ADLs, meal prep, meds and transportation.    Medications reviewed and all medications are available in the home this visit.    The following education was provided regarding medications:  Cefepime antibiotic is used to treat certain infections caused by bacteria including pneumonia, and skin, urinary tract, and kidney infections.  SN instructed patient / caregiver regarding medication Coumadin / Warfarin. SN explained to patient / caregiver that Coumadin / Warfarin is an anticoagulant which is prescribed to people with an increased tendency for thrombosis or as secondary prophylaxis ( prevention of further episodes ) in those individuals that have already formed a blood clot ( thrombus ). Explained that Warfarin treatment can help prevent formation of future blood clots and help reduce the risk of embolism. Informed that Warfarin will prolong bleeding time, instructed to avoid activities that increase risks of trauma. SN explained to patient / caregiver that the adverse effects of this medication includes: hemorrhage, nosebleeds and blood in urine or stool ( black tarry stools ). Instructed to notify physician if experiencing unusual bleeding while on this medication.    .    MD notified of any discrepancies/look a-like medications/medication interactions: none  Medications are effective at this time.      Home health supplies by type and quantity ordered/delivered this visit include: n/a    Patient education provided this visit: Instructed in materials used in wound care. However, even with proper treatment, a wound infection may occur. Check the wound daily for signs of infection like increased drainage or bleeding from the wound that won’t stop with direct pressure, redness in or around the wound, foul odor or pus coming from the wound, increased swelling around the wound and ever above 101.0°F or shaking

## 2024-01-09 ENCOUNTER — HOME CARE VISIT (OUTPATIENT)
Age: 79
End: 2024-01-09

## 2024-01-09 PROCEDURE — G0157 HHC PT ASSISTANT EA 15: HCPCS

## 2024-01-11 VITALS
HEART RATE: 58 BPM | DIASTOLIC BLOOD PRESSURE: 64 MMHG | OXYGEN SATURATION: 98 % | RESPIRATION RATE: 17 BRPM | TEMPERATURE: 98.4 F | SYSTOLIC BLOOD PRESSURE: 110 MMHG

## 2024-01-11 NOTE — HOME HEALTH
SUBJECTIVE: Patient states that he is doing well today, notes that he has no new complaints or concerns. Denies any falls or changes in medications at this time.     CAREGIVER INVOLVEMENT/ASSISTANCE NEEDED FOR: Patient lives in a one story home with his son and family, he is currently reliant on assistance for completion of most to all ADL's such as cooking, cleaning, bathing and dressing.     OBJECTIVE:  See interventions.    PATIENT RESPONSE TO TREATMENT:   Pt challenged with new therex per subjective and visable evidence. Despite this he remains in good spirits, and ready to return to prior level of function.    PATIENT LEVEL OF UNDERSTANDING OF EDUCATION PROVIDED: Pt provided education on importance of HEP and how often to complete to increase strength and decrease overall stiffness. Educated on signs and sx of infection and steps to take if one were to arise. Educated on importance of decreasing chance and bed sore and to make sure she is performing weight shifting every 1-2 hours.    ASSESSMENT OF PROGRESS TOWARD GOALS: Patient was seen for PT follow up session for LE strengthening, gait training and transfer training. Sit to stand transfers from chair to stance with B UE pushoff required to reach stance today with 10 total reps completed. SBA needed for safety of transfer completed today. Gait completed inside of home with use of SPC >100ft completed before rest break was required. SBA needed for safety of completion of gait today.    HOME EXERCISE PROGRAM: Patient given written instructions on HEP and how often to complete to maintain compliance. With intermitten gait training to be completed peridocially throughout the day.     THE FOLLOWING DISCHARGE PLANNING WAS DISCUSSED WITH THE PATIENT/CAREGIVER: Patient to be D/C from  PT when all goals have been met or progressed well towards.     PLAN FOR NEXT VISIT: Cont PT PoC with focus on transfer training, gait training, and functional strength for

## 2024-01-12 ENCOUNTER — HOME CARE VISIT (OUTPATIENT)
Age: 79
End: 2024-01-12

## 2024-01-12 VITALS
HEART RATE: 60 BPM | SYSTOLIC BLOOD PRESSURE: 160 MMHG | TEMPERATURE: 97.5 F | OXYGEN SATURATION: 99 % | RESPIRATION RATE: 18 BRPM | DIASTOLIC BLOOD PRESSURE: 78 MMHG

## 2024-01-12 VITALS
TEMPERATURE: 97.6 F | SYSTOLIC BLOOD PRESSURE: 150 MMHG | RESPIRATION RATE: 16 BRPM | OXYGEN SATURATION: 99 % | HEART RATE: 60 BPM | DIASTOLIC BLOOD PRESSURE: 80 MMHG

## 2024-01-12 PROCEDURE — G0299 HHS/HOSPICE OF RN EA 15 MIN: HCPCS

## 2024-01-12 PROCEDURE — G0152 HHCP-SERV OF OT,EA 15 MIN: HCPCS

## 2024-01-12 ASSESSMENT — ENCOUNTER SYMPTOMS
PAIN LOCATION - PAIN QUALITY: ACHING
PAIN LOCATION - PAIN QUALITY: ACH
STOOL DESCRIPTION: FIRM

## 2024-01-12 NOTE — HOME HEALTH
Skilled Reason for admission/summary of clinical condition: adm thru ED for right leg cellulitis.     PMHX: Allergic rhinitis • Benign paroxysmal positional vertigo • Dermatophytosis • Hypertrophy (benign) of prostate • Phlebitis and thrombophlebitis of other deep vessels of unspecified lower extremity • Type II diabetes mellitus    Caregiver involvement: Pt lives alone, family available as needed and assists with meds, meals, functional mobility, transport to MD appts. Pt lives in one story house with 4 steps to enter with handrails     PLOF: Pt was ambulatory using cane and was independent with ADLs/IADLs    Medications: Pt medications update with pt taking furosemide (LASIX) 40 MG tablet once daily and educated to continue as directed per MD. Pt educated to not have grapefruit or cranberry juice due to warfarin contraindication.     SUBJECTIVE: Pt expressing pain with back spasms. Pt nephew intermitanty present throughout.     DME ORDERED/RECOMMENEDED: N/A    OBJECTIVE:  BATHING: Pt sponge bathing mostly with SBA/CGA due to bandages. Pt has tub shower unit with no assistive device and slidding glass door. Pt expressing no plans to use until his RLE is completly healed  TOILETING: Pt SBA for toileting  UB DRESSING: Pt SBA for upper body dressing to debora/doff shirts  LB DRESSING: Pt CGA/SBA for lower body dressing to debora/doff socks, shoes and pants using figure 4 dressing technique. Pt educated to complete in sititng   GROOMING: Pt SBA for grooming for hair care, oral care and washing hands/face  FEEDING: Pt independent for self feeding    Modified Erasmo RPE 3/10 after performing ambulation, transfers, and I/ADL assessment   BUE MMT: 4+/5  BUE ROM:WFL    VISION:Pt vision is WFL for reading directions/medications and to navigate their home environment safely.    BALANCE:Pt with good sitting balance/tolerance. Pt with fair standing balance/tolerance using singe point cane    BUE COORDINATION: Pt B UE coordination WFL

## 2024-01-12 NOTE — HOME HEALTH
Skilled reason for visit: PICC line removed, wound care, wound is healed and superficial. Patient noted with 2+ pitting to right leg, ACE wrap applied, educated patient on use of compression stockings and elevation. PT/INR recheck ordered by . PT/INR 1.9 and 22.6. Called into Dr. Arango orders to take 5mg coumadin tonight and continue 2.5 mg Coumadin QD. PT/INR recheck on 1/17/24.    Caregiver involvement: family assist with ADLs, meal prep, meds and transportation.    Medications reviewed and all medications are available in the home this visit.    The following education was provided regarding medications: SN instructed patient / caregiver regarding medication Coumadin / Warfarin. SN explained to patient / caregiver that Coumadin / Warfarin is an anticoagulant which is prescribed to people with an increased tendency for thrombosis or as secondary prophylaxis ( prevention of further episodes ) in those individuals that have already formed a blood clot ( thrombus ). Explained that Warfarin treatment can help prevent formation of future blood clots and help reduce the risk of embolism. Informed that Warfarin will prolong bleeding time, instructed to avoid activities that increase risks of trauma. SN explained to patient / caregiver that the adverse effects of this medication includes: hemorrhage, nosebleeds and blood in urine or stool ( black tarry stools ). Instructed to notify physician if experiencing unusual bleeding while on this medication.        MD notified of any discrepancies/look a-like medications/medication interactions: none  Medications are effective at this time.      Home health supplies by type and quantity ordered/delivered this visit include: n/a    Patient education provided this visit: Instructed patient/caregiver to notify SN/PT of signs and symptoms of anticoagulant adverse effects including bleeding gums, blood in urine or stool, easily bruising.  Instructed on importance of fall

## 2024-01-15 ENCOUNTER — HOME CARE VISIT (OUTPATIENT)
Age: 79
End: 2024-01-15

## 2024-01-15 PROCEDURE — G0157 HHC PT ASSISTANT EA 15: HCPCS

## 2024-01-17 ENCOUNTER — HOME CARE VISIT (OUTPATIENT)
Age: 79
End: 2024-01-17

## 2024-01-17 VITALS
SYSTOLIC BLOOD PRESSURE: 152 MMHG | TEMPERATURE: 97.4 F | HEART RATE: 58 BPM | RESPIRATION RATE: 18 BRPM | DIASTOLIC BLOOD PRESSURE: 70 MMHG | OXYGEN SATURATION: 98 %

## 2024-01-17 PROCEDURE — G0299 HHS/HOSPICE OF RN EA 15 MIN: HCPCS

## 2024-01-17 PROCEDURE — G0157 HHC PT ASSISTANT EA 15: HCPCS

## 2024-01-17 ASSESSMENT — ENCOUNTER SYMPTOMS
DYSPNEA ACTIVITY LEVEL: AFTER AMBULATING 10 - 20 FT
PAIN LOCATION - PAIN QUALITY: ACHING

## 2024-01-18 ASSESSMENT — ENCOUNTER SYMPTOMS: PAIN LOCATION - PAIN QUALITY: ACHE

## 2024-01-18 NOTE — HOME HEALTH
Skilled reason for visit: INR 4.5  PT 51.4.Patient taking 2.5 mg Coumadin, reported small nosebleed on yesterday. Results called into Dr. Arango office, recheck scheduled for next wednesday. Wound to RLE healed. Patient/caregiver educated on wearing compression stockings to aid with BLE swelling. Follow up with PCP scheduled on monday.    Caregiver involvement: family assist with ADLs, meal prep, meds and transportation.    Medications reviewed and all medications are available in the home this visit.    The following education was provided regarding medications: SN instructed on proper method of medication intake, as many people taking prescription medications do not follow their doctors orders. SN instruct on medication compliance to better control the patients disease process, to refill medication on time to prevent missed/skipped doses. do not take any medication that does not belong to you. Also ask your doctor before taking any over the counter medication to avoid interactions. Patient verbalized understanding.        MD notified of any discrepancies/look a-like medications/medication interactions: none  Medications are effective at this time.      Home health supplies by type and quantity ordered/delivered this visit include: n/a    Patient education provided this visit: Elevate legs above the level of the heart which puts minimal pressure on the back of the knees and thighs and lower back. Other help to decrease swelling is limiting salt intake, drink plenty of water, avoid sitting with the feet dependent.    Instructed that High blood pressure (hypertension) is a leading cause of kidney disease and kidney failure (end-stage renal disease). Hypertension can cause damage to the blood vessels and filters in the kidney, making removal of waste from the body difficult. SN instructed patient about some measures aimed to managing & controlling hypertension, such as: eating low sodium diet , increase more fruits to

## 2024-01-18 NOTE — HOME HEALTH
SUBJECTIVE: Patient states that he is doing well today, notes that he has no new complaints or concerns. Denies any falls or changes in medications at this time.     CAREGIVER INVOLVEMENT/ASSISTANCE NEEDED FOR: Patient lives in a one story home with his son and family, he is currently reliant on assistance for completion of most to all ADL's such as cooking, cleaning, bathing and dressing.     OBJECTIVE:  See interventions.    PATIENT RESPONSE TO TREATMENT:   Pt challenged with new therex per subjective and visable evidence. Despite this he remains in good spirits, and ready to return to prior level of function.    PATIENT LEVEL OF UNDERSTANDING OF EDUCATION PROVIDED: Patient verbalized understanding of all education provided during session today.     ASSESSMENT OF PROGRESS TOWARD GOALS: Patient was seen for PT follow up session for LE strengthening, gait training and transfer training. Sit to stand transfers completed from chair to stance with B UE pushoff to reach stance today, patient completed 10 reps in total. MOD I demosntrated, showing improvement in functional indpendence as evidence by previously performing with SBA required.    HOME EXERCISE PROGRAM: Patient given written instructions on HEP and how often to complete to maintain compliance. With intermitten gait training to be completed peridocially throughout the day.     THE FOLLOWING DISCHARGE PLANNING WAS DISCUSSED WITH THE PATIENT/CAREGIVER: Patient to be D/C from  PT when all goals have been met or progressed well towards.     PLAN FOR NEXT VISIT: Cont PT PoC with focus on transfer training, gait training, and functional strength for progression of functional independence.

## 2024-01-24 ENCOUNTER — HOME CARE VISIT (OUTPATIENT)
Age: 79
End: 2024-01-24
Payer: MEDICARE

## 2024-01-24 VITALS
HEART RATE: 58 BPM | DIASTOLIC BLOOD PRESSURE: 64 MMHG | TEMPERATURE: 97.5 F | SYSTOLIC BLOOD PRESSURE: 132 MMHG | OXYGEN SATURATION: 98 % | RESPIRATION RATE: 17 BRPM

## 2024-01-24 PROCEDURE — G0299 HHS/HOSPICE OF RN EA 15 MIN: HCPCS

## 2024-01-24 PROCEDURE — G0157 HHC PT ASSISTANT EA 15: HCPCS

## 2024-01-24 ASSESSMENT — ENCOUNTER SYMPTOMS: DYSPNEA ACTIVITY LEVEL: AFTER AMBULATING 10 - 20 FT

## 2024-01-24 NOTE — HOME HEALTH
symptoms of low blood sugar.    Patient was instructed on strategies that can significantly help decrease the risk of a fall such as: Skid-proof mats or strips in the shower and bathtub, Removal of furniture that can slip away if grabbed accidentally for support, supportive non-slip footwear and not walking in stocking feet.         Sharps education provided: n/a    Patient level of understanding of education provided: Patient and caregiver verbalized understanding      Patient response to procedure performed:  Patient tolerated w/o any increased level of pain      Agency Progress toward goals: progressing    Patient's Progress towards personal goals: progressing    Home exercise program: Take all medications as prescribed, follow all fall precaution measures, attend all future medical appointments

## 2024-01-25 ENCOUNTER — HOME CARE VISIT (OUTPATIENT)
Age: 79
End: 2024-01-25
Payer: MEDICARE

## 2024-01-25 PROCEDURE — G0157 HHC PT ASSISTANT EA 15: HCPCS

## 2024-01-25 ASSESSMENT — ENCOUNTER SYMPTOMS: DYSPNEA ACTIVITY LEVEL: AFTER AMBULATING 10 - 20 FT

## 2024-01-26 VITALS
SYSTOLIC BLOOD PRESSURE: 134 MMHG | TEMPERATURE: 97.5 F | TEMPERATURE: 98.4 F | RESPIRATION RATE: 17 BRPM | RESPIRATION RATE: 17 BRPM | HEART RATE: 76 BPM | OXYGEN SATURATION: 98 % | OXYGEN SATURATION: 98 % | SYSTOLIC BLOOD PRESSURE: 132 MMHG | DIASTOLIC BLOOD PRESSURE: 64 MMHG | HEART RATE: 76 BPM | DIASTOLIC BLOOD PRESSURE: 88 MMHG

## 2024-01-26 NOTE — HOME HEALTH
SUBJECTIVE: Patient states that he is doing well today, notes that he has no new complaints or concerns. Denies any falls or changes in medications at this time.     CAREGIVER INVOLVEMENT/ASSISTANCE NEEDED FOR: Pt currently Independent with ADL's such as cooking, cleaning and dressing.     OBJECTIVE:  See interventions.    PATIENT RESPONSE TO TREATMENT:   Pt challenged with new therex per subjective and visable evidence. Despite this he remains in good spirits, and ready to return to prior level of function.    PATIENT LEVEL OF UNDERSTANDING OF EDUCATION PROVIDED: Pt and caregiver provided education today on D/C planning and expectations from outpatient PT. All questions answered upon leaving todays session. Both pt and caregiver verbalized understanding of all above.     ASSESSMENT OF PROGRESS TOWARD GOALS: Strength/ROM-    MMT R LE hip flex 4/5, hip abd 4/5, hip ext 4/5, knee flex 4/5, knee ext 4/5.........MMT L LE hip flex 4/5, hip abd 4/5, hip ext 4/5, knee flex 4/5, knee ext 4/5  ADL's- Pt currently Independent with ADL's such as cooking, cleaning and dressing.   Functional Mobiliy- Pt currently able to complete gait training indoors and outdoors with MOD I and with use of SPC PRN on level and unelevel srufaces for distances greater than 300ft. Stair Mobility pt demonstrates MOD I with use of single hand rail to enter and exit home. Demonstrates MOD I with transfers such as car, sit to stands, bed mobility and toilet transfers.   Special tests- 5x sit to stands- 19secs   TUG- 40secs   6min walk test- Able with slow pace on level and unlevel surfaces with use of SPC >300ft   Tinetti- 18/28  Reccomendations- D/C from HH PT and transition to OP PT as pt has met all goals at this time    HOME EXERCISE PROGRAM: Patient currently compliant with HEP completing 2-3 times per day with intermitten gait training to be performed throughout the day.     THE FOLLOWING DISCHARGE PLANNING WAS DISCUSSED WITH THE

## 2024-01-29 ENCOUNTER — HOME CARE VISIT (OUTPATIENT)
Age: 79
End: 2024-01-29
Payer: MEDICARE

## 2024-01-29 VITALS
RESPIRATION RATE: 17 BRPM | HEART RATE: 69 BPM | OXYGEN SATURATION: 99 % | TEMPERATURE: 98.2 F | DIASTOLIC BLOOD PRESSURE: 72 MMHG | SYSTOLIC BLOOD PRESSURE: 130 MMHG

## 2024-01-29 PROCEDURE — G0151 HHCP-SERV OF PT,EA 15 MIN: HCPCS

## 2024-01-29 NOTE — HOME HEALTH
Pt. clinically discharged and documentation finalized for completion of PT discharge    Caregiver involvement: lives alone, niece and family assist as needed for running errands    Medications reconciled and all medications are available in the home this visit.  Meds reconciled/ reviewed Medications are effective at this time.     Patient education provided this visit: since all goals have been met and education has been completed, patient is medically stable and patient/caregiver are able to independently manage medications     Strength: increased strength as demo by 5X sit <> stand 19sec. This allows the patient increased functional independence and mobility      TRANSFERS: independent with all transfers which demonstrates and improvement from the initial evaluation score of SBA/CGA with RW. This allows the patient increased functional independence and mobility.     GAIT/WC MOBILITY: Pt. is able to ambulate >300 ft outside over even and uneven surfaces with cane using mod I ,Patient amb without AD in the house.  This represents an improvement from the initial evaluation of 45 ft using rw on level surfaces with SBA.       STAIRS: 4 stairs using one handrail step to pattern    BALANCE: Patient's final Tinetti  19/28,  TUG is 34 seconds which is an improvement from the initial evaluation score not able to complete  This allows the patient to be functionally more independent and have a decreased fall risk.     Progress toward goals: Patient has met all goals, see interventions for details. Pt. was able to return demonstrate all mobility training and HEP shown independently.     Patient response to treatment and education: Patient is aware to follow up with PCP/Surgeon as ordered. Opportunity for questions provided at this time. Patient aware to refer any questions after discharge to MD office.  MD notified of DC.   Home exercise program: Patient is doing the HEP 2-3/day as able using previously provided HEP

## 2024-02-12 ENCOUNTER — TRANSCRIBE ORDERS (OUTPATIENT)
Facility: HOSPITAL | Age: 79
End: 2024-02-12

## 2024-02-12 ENCOUNTER — HOSPITAL ENCOUNTER (OUTPATIENT)
Facility: HOSPITAL | Age: 79
Discharge: HOME | End: 2024-02-15
Payer: MEDICARE

## 2024-02-12 DIAGNOSIS — N17.9 ACUTE KIDNEY INJURY (NONTRAUMATIC) (HCC): ICD-10-CM

## 2024-02-12 DIAGNOSIS — N17.9 ACUTE KIDNEY INJURY (NONTRAUMATIC) (HCC): Primary | ICD-10-CM

## 2024-02-12 LAB
ALBUMIN SERPL-MCNC: 3.3 G/DL (ref 3.4–5)
ANION GAP SERPL CALC-SCNC: 5 MMOL/L (ref 3–18)
BUN SERPL-MCNC: 29 MG/DL (ref 7–18)
BUN/CREAT SERPL: 17 (ref 12–20)
CALCIUM SERPL-MCNC: 8.4 MG/DL (ref 8.5–10.1)
CALCIUM SERPL-MCNC: 8.5 MG/DL (ref 8.5–10.1)
CHLORIDE SERPL-SCNC: 105 MMOL/L (ref 100–111)
CO2 SERPL-SCNC: 27 MMOL/L (ref 21–32)
CREAT SERPL-MCNC: 1.75 MG/DL (ref 0.6–1.3)
CREAT UR-MCNC: 37 MG/DL (ref 30–125)
ERYTHROCYTE [DISTWIDTH] IN BLOOD BY AUTOMATED COUNT: 13.2 % (ref 11.6–14.5)
GLUCOSE SERPL-MCNC: 95 MG/DL (ref 74–99)
HCT VFR BLD AUTO: 29.1 % (ref 36–48)
HGB BLD-MCNC: 9.8 G/DL (ref 13–16)
MCH RBC QN AUTO: 32.9 PG (ref 24–34)
MCHC RBC AUTO-ENTMCNC: 33.7 G/DL (ref 31–37)
MCV RBC AUTO: 97.7 FL (ref 78–100)
NRBC # BLD: 0 K/UL (ref 0–0.01)
NRBC BLD-RTO: 0 PER 100 WBC
PHOSPHATE SERPL-MCNC: 3.3 MG/DL (ref 2.5–4.9)
PLATELET # BLD AUTO: 166 K/UL (ref 135–420)
PMV BLD AUTO: 11.3 FL (ref 9.2–11.8)
POTASSIUM SERPL-SCNC: 4.1 MMOL/L (ref 3.5–5.5)
PROT UR-MCNC: 24 MG/DL
PTH-INTACT SERPL-MCNC: 237.3 PG/ML (ref 18.4–88)
RBC # BLD AUTO: 2.98 M/UL (ref 4.35–5.65)
SODIUM SERPL-SCNC: 137 MMOL/L (ref 136–145)
WBC # BLD AUTO: 6.2 K/UL (ref 4.6–13.2)

## 2024-02-12 PROCEDURE — 82570 ASSAY OF URINE CREATININE: CPT

## 2024-02-12 PROCEDURE — 83970 ASSAY OF PARATHORMONE: CPT

## 2024-02-12 PROCEDURE — 80069 RENAL FUNCTION PANEL: CPT

## 2024-02-12 PROCEDURE — 84156 ASSAY OF PROTEIN URINE: CPT

## 2024-02-12 PROCEDURE — 85027 COMPLETE CBC AUTOMATED: CPT

## 2024-02-12 PROCEDURE — 36415 COLL VENOUS BLD VENIPUNCTURE: CPT

## 2024-02-15 ENCOUNTER — HOME HEALTH ADMISSION (OUTPATIENT)
Age: 79
End: 2024-02-15

## 2024-02-18 ENCOUNTER — HOME CARE VISIT (OUTPATIENT)
Age: 79
End: 2024-02-18

## 2024-02-22 ENCOUNTER — HOME CARE VISIT (OUTPATIENT)
Age: 79
End: 2024-02-22

## 2024-03-25 ENCOUNTER — TRANSCRIBE ORDERS (OUTPATIENT)
Facility: HOSPITAL | Age: 79
End: 2024-03-25

## 2024-03-25 ENCOUNTER — HOSPITAL ENCOUNTER (OUTPATIENT)
Facility: HOSPITAL | Age: 79
Discharge: HOME | End: 2024-03-28
Payer: MEDICARE

## 2024-03-25 DIAGNOSIS — N18.32 STAGE 3B CHRONIC KIDNEY DISEASE (HCC): ICD-10-CM

## 2024-03-25 DIAGNOSIS — N18.32 STAGE 3B CHRONIC KIDNEY DISEASE (HCC): Primary | ICD-10-CM

## 2024-03-25 LAB
ALBUMIN SERPL-MCNC: 3.6 G/DL (ref 3.4–5)
ANION GAP SERPL CALC-SCNC: 5 MMOL/L (ref 3–18)
BUN SERPL-MCNC: 32 MG/DL (ref 7–18)
BUN/CREAT SERPL: 16 (ref 12–20)
CALCIUM SERPL-MCNC: 8.8 MG/DL (ref 8.5–10.1)
CHLORIDE SERPL-SCNC: 103 MMOL/L (ref 100–111)
CO2 SERPL-SCNC: 31 MMOL/L (ref 21–32)
CREAT SERPL-MCNC: 2.02 MG/DL (ref 0.6–1.3)
GLUCOSE SERPL-MCNC: 150 MG/DL (ref 74–99)
PHOSPHATE SERPL-MCNC: 4.1 MG/DL (ref 2.5–4.9)
POTASSIUM SERPL-SCNC: 4.2 MMOL/L (ref 3.5–5.5)
SODIUM SERPL-SCNC: 139 MMOL/L (ref 136–145)

## 2024-03-25 PROCEDURE — 36415 COLL VENOUS BLD VENIPUNCTURE: CPT

## 2024-03-25 PROCEDURE — 80069 RENAL FUNCTION PANEL: CPT

## 2024-06-17 ENCOUNTER — TRANSCRIBE ORDERS (OUTPATIENT)
Facility: HOSPITAL | Age: 79
End: 2024-06-17

## 2024-06-17 ENCOUNTER — HOSPITAL ENCOUNTER (OUTPATIENT)
Facility: HOSPITAL | Age: 79
Discharge: HOME | End: 2024-06-20
Payer: MEDICARE

## 2024-06-17 DIAGNOSIS — N18.32 STAGE 3B CHRONIC KIDNEY DISEASE (HCC): ICD-10-CM

## 2024-06-17 DIAGNOSIS — E11.9 TYPE 2 DIABETES MELLITUS WITHOUT COMPLICATION, UNSPECIFIED WHETHER LONG TERM INSULIN USE (HCC): ICD-10-CM

## 2024-06-17 DIAGNOSIS — E11.9 TYPE 2 DIABETES MELLITUS WITHOUT COMPLICATION, UNSPECIFIED WHETHER LONG TERM INSULIN USE (HCC): Primary | ICD-10-CM

## 2024-06-17 LAB
ALBUMIN SERPL-MCNC: 3.8 G/DL (ref 3.4–5)
ANION GAP SERPL CALC-SCNC: 8 MMOL/L (ref 3–18)
BUN SERPL-MCNC: 53 MG/DL (ref 7–18)
BUN/CREAT SERPL: 24 (ref 12–20)
CALCIUM SERPL-MCNC: 8.7 MG/DL (ref 8.5–10.1)
CALCIUM SERPL-MCNC: 9 MG/DL (ref 8.5–10.1)
CHLORIDE SERPL-SCNC: 108 MMOL/L (ref 100–111)
CO2 SERPL-SCNC: 24 MMOL/L (ref 21–32)
CREAT SERPL-MCNC: 2.2 MG/DL (ref 0.6–1.3)
CREAT UR-MCNC: 36 MG/DL (ref 30–125)
CREAT UR-MCNC: 36 MG/DL (ref 30–125)
ERYTHROCYTE [DISTWIDTH] IN BLOOD BY AUTOMATED COUNT: 12.8 % (ref 11.6–14.5)
GLUCOSE SERPL-MCNC: 122 MG/DL (ref 74–99)
HCT VFR BLD AUTO: 35.8 % (ref 36–48)
HGB BLD-MCNC: 11.8 G/DL (ref 13–16)
MCH RBC QN AUTO: 33.1 PG (ref 24–34)
MCHC RBC AUTO-ENTMCNC: 33 G/DL (ref 31–37)
MCV RBC AUTO: 100.6 FL (ref 78–100)
MICROALBUMIN UR-MCNC: 4.39 MG/DL (ref 0–3)
MICROALBUMIN/CREAT UR-RTO: 122 MG/G (ref 0–30)
NRBC # BLD: 0 K/UL (ref 0–0.01)
NRBC BLD-RTO: 0 PER 100 WBC
PHOSPHATE SERPL-MCNC: 4 MG/DL (ref 2.5–4.9)
PLATELET # BLD AUTO: 108 K/UL (ref 135–420)
PMV BLD AUTO: 11.5 FL (ref 9.2–11.8)
POTASSIUM SERPL-SCNC: 4.4 MMOL/L (ref 3.5–5.5)
PROT UR-MCNC: 13 MG/DL
PTH-INTACT SERPL-MCNC: 203.5 PG/ML (ref 18.4–88)
RBC # BLD AUTO: 3.56 M/UL (ref 4.35–5.65)
SODIUM SERPL-SCNC: 140 MMOL/L (ref 136–145)
WBC # BLD AUTO: 7.4 K/UL (ref 4.6–13.2)

## 2024-06-17 PROCEDURE — 83970 ASSAY OF PARATHORMONE: CPT

## 2024-06-17 PROCEDURE — 80069 RENAL FUNCTION PANEL: CPT

## 2024-06-17 PROCEDURE — 85027 COMPLETE CBC AUTOMATED: CPT

## 2024-06-17 PROCEDURE — 82043 UR ALBUMIN QUANTITATIVE: CPT

## 2024-06-17 PROCEDURE — 82570 ASSAY OF URINE CREATININE: CPT

## 2024-06-17 PROCEDURE — 84156 ASSAY OF PROTEIN URINE: CPT

## 2024-06-17 PROCEDURE — 36415 COLL VENOUS BLD VENIPUNCTURE: CPT

## 2024-06-24 ENCOUNTER — CLINICAL DOCUMENTATION (OUTPATIENT)
Facility: HOSPITAL | Age: 79
End: 2024-06-24

## 2024-06-25 NOTE — PROGRESS NOTES
Sonido Capellan Jr  Appointment: 2024 2:30 PM  Location: [facility]  Patient #: [id number]  : 1945  Undefined / Language: English / Race: White  Male      History of Present Illness (Amador Clay MD; 2024 5:29 AM)  The patient is a 79 year old male who presents for a Recheck of Chronic kidney disease.This is classified as stage 3.    Note:  Patient is a 79-year-old male who follows up post hospital stay for chronic kidney disease stage IIIb    Past medical history  #1 hypertension longstanding, controlled  #2 diabetes mellitus type 2 with complications, last hemoglobin A1c 6.3  #3 prostatic hypertrophy  #4 history of gross hematuria  #5 history of lower extremity cellulitis    Patient was in the hosptal admitted in December for cellulitis noted to an DEV, baseline creatinine in  was 1.5 but has progressed to 2.7 in the hospital.  Does have longstanding history of diabetes and complications history of microalbuminuria.  Likely etiology being diabetic nephropathy.  CT abdomen at that time showed no evidence of nephrolithiasis or hydronephrosis.  On discharge patient's creatinine much improved, now creatinine down to baseline at 1.7.  Proteinuria does appear to be close to 1 g    Interval history:    creat 2.2  gfr 30  MCR 1.22  tolerating faxiga  bp ok  denies urinary  symptoms    Assessment & Plan (Amador Clay MD; 2024 5:32 AM)    Stage 3b chronic kidney disease (N18.32) <HCCv24 138  HCCv28 328>  Impression: Assessment and plan    #1 chronic kidney disease stage IIIb, etiology diabetic nephropathy and hypertension nephrosclerosis does have proteinuria which is sub nephrotic around 1 g. Baseline creatinine of around 1.7 EGFR of 40 but now upto 2.0 and egfr 30 , likely prerenal , needs to increase fluid intake . 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

## 2024-10-14 ENCOUNTER — HOSPITAL ENCOUNTER (OUTPATIENT)
Facility: HOSPITAL | Age: 79
Discharge: HOME | End: 2024-10-17
Payer: MEDICARE

## 2024-10-14 ENCOUNTER — TRANSCRIBE ORDERS (OUTPATIENT)
Facility: HOSPITAL | Age: 79
End: 2024-10-14

## 2024-10-14 DIAGNOSIS — E11.69 TYPE 2 DIABETES MELLITUS WITH OTHER SPECIFIED COMPLICATION, UNSPECIFIED WHETHER LONG TERM INSULIN USE (HCC): ICD-10-CM

## 2024-10-14 DIAGNOSIS — N18.32 STAGE 3B CHRONIC KIDNEY DISEASE (HCC): ICD-10-CM

## 2024-10-14 DIAGNOSIS — N18.32 STAGE 3B CHRONIC KIDNEY DISEASE (HCC): Primary | ICD-10-CM

## 2024-10-14 LAB
ALBUMIN SERPL-MCNC: 3.6 G/DL (ref 3.4–5)
ANION GAP SERPL CALC-SCNC: 6 MMOL/L (ref 3–18)
BUN SERPL-MCNC: 54 MG/DL (ref 7–18)
BUN/CREAT SERPL: 25 (ref 12–20)
CALCIUM SERPL-MCNC: 8.4 MG/DL (ref 8.5–10.1)
CALCIUM SERPL-MCNC: 8.5 MG/DL (ref 8.5–10.1)
CHLORIDE SERPL-SCNC: 108 MMOL/L (ref 100–111)
CO2 SERPL-SCNC: 24 MMOL/L (ref 21–32)
CREAT SERPL-MCNC: 2.19 MG/DL (ref 0.6–1.3)
CREAT UR-MCNC: 54 MG/DL (ref 30–125)
CREAT UR-MCNC: 54 MG/DL (ref 30–125)
ERYTHROCYTE [DISTWIDTH] IN BLOOD BY AUTOMATED COUNT: 13 % (ref 11.6–14.5)
GLUCOSE SERPL-MCNC: 175 MG/DL (ref 74–99)
HCT VFR BLD AUTO: 37.8 % (ref 36–48)
HGB BLD-MCNC: 12.3 G/DL (ref 13–16)
MCH RBC QN AUTO: 33.1 PG (ref 24–34)
MCHC RBC AUTO-ENTMCNC: 32.5 G/DL (ref 31–37)
MCV RBC AUTO: 101.6 FL (ref 78–100)
MICROALBUMIN UR-MCNC: 3.37 MG/DL (ref 0–3)
MICROALBUMIN/CREAT UR-RTO: 62 MG/G (ref 0–30)
NRBC # BLD: 0 K/UL (ref 0–0.01)
NRBC BLD-RTO: 0 PER 100 WBC
PHOSPHATE SERPL-MCNC: 4.6 MG/DL (ref 2.5–4.9)
PLATELET # BLD AUTO: 114 K/UL (ref 135–420)
PMV BLD AUTO: 11.7 FL (ref 9.2–11.8)
POTASSIUM SERPL-SCNC: 4.2 MMOL/L (ref 3.5–5.5)
PROT UR-MCNC: 15 MG/DL
PTH-INTACT SERPL-MCNC: 189.4 PG/ML (ref 18.4–88)
RBC # BLD AUTO: 3.72 M/UL (ref 4.35–5.65)
SODIUM SERPL-SCNC: 138 MMOL/L (ref 136–145)
WBC # BLD AUTO: 8.2 K/UL (ref 4.6–13.2)

## 2024-10-14 PROCEDURE — 84156 ASSAY OF PROTEIN URINE: CPT

## 2024-10-14 PROCEDURE — 80069 RENAL FUNCTION PANEL: CPT

## 2024-10-14 PROCEDURE — 85027 COMPLETE CBC AUTOMATED: CPT

## 2024-10-14 PROCEDURE — 83970 ASSAY OF PARATHORMONE: CPT

## 2024-10-14 PROCEDURE — 36415 COLL VENOUS BLD VENIPUNCTURE: CPT

## 2024-10-14 PROCEDURE — 82570 ASSAY OF URINE CREATININE: CPT

## 2024-10-14 PROCEDURE — 82043 UR ALBUMIN QUANTITATIVE: CPT

## 2025-01-10 ENCOUNTER — TRANSCRIBE ORDERS (OUTPATIENT)
Facility: HOSPITAL | Age: 80
End: 2025-01-10

## 2025-01-10 DIAGNOSIS — R31.0 GROSS HEMATURIA: Primary | ICD-10-CM

## 2025-02-17 ENCOUNTER — HOSPITAL ENCOUNTER (OUTPATIENT)
Facility: HOSPITAL | Age: 80
Discharge: HOME | End: 2025-02-20
Payer: MEDICARE

## 2025-02-17 ENCOUNTER — TRANSCRIBE ORDERS (OUTPATIENT)
Facility: HOSPITAL | Age: 80
End: 2025-02-17

## 2025-02-17 DIAGNOSIS — N18.32 CHRONIC KIDNEY DISEASE (CKD) STAGE G3B/A1, MODERATELY DECREASED GLOMERULAR FILTRATION RATE (GFR) BETWEEN 30-44 ML/MIN/1.73 SQUARE METER AND ALBUMINURIA CREATININE RATIO LESS THAN 30 MG/G (HCC): Primary | ICD-10-CM

## 2025-02-17 DIAGNOSIS — E08.00 DIABETES MELLITUS DUE TO UNDERLYING CONDITION WITH HYPEROSMOLARITY WITHOUT COMA, WITHOUT LONG-TERM CURRENT USE OF INSULIN (HCC): ICD-10-CM

## 2025-02-17 DIAGNOSIS — N18.32 CHRONIC KIDNEY DISEASE (CKD) STAGE G3B/A1, MODERATELY DECREASED GLOMERULAR FILTRATION RATE (GFR) BETWEEN 30-44 ML/MIN/1.73 SQUARE METER AND ALBUMINURIA CREATININE RATIO LESS THAN 30 MG/G (HCC): ICD-10-CM

## 2025-02-17 LAB
25(OH)D3 SERPL-MCNC: 9.4 NG/ML (ref 30–100)
ALBUMIN SERPL-MCNC: 3.3 G/DL (ref 3.4–5)
ANION GAP SERPL CALC-SCNC: 7 MMOL/L (ref 3–18)
BASOPHILS # BLD: 0.15 K/UL (ref 0–0.1)
BASOPHILS NFR BLD: 2 % (ref 0–2)
BUN SERPL-MCNC: 37 MG/DL (ref 7–18)
BUN/CREAT SERPL: 19 (ref 12–20)
CALCIUM SERPL-MCNC: 7.5 MG/DL (ref 8.5–10.1)
CALCIUM SERPL-MCNC: 7.7 MG/DL (ref 8.5–10.1)
CHLORIDE SERPL-SCNC: 106 MMOL/L (ref 100–111)
CO2 SERPL-SCNC: 24 MMOL/L (ref 21–32)
CREAT SERPL-MCNC: 1.94 MG/DL (ref 0.6–1.3)
CREAT UR-MCNC: 32 MG/DL (ref 30–125)
CREAT UR-MCNC: 33 MG/DL (ref 30–125)
DIFFERENTIAL METHOD BLD: ABNORMAL
EOSINOPHIL # BLD: 0.31 K/UL (ref 0–0.4)
EOSINOPHIL NFR BLD: 4 % (ref 0–5)
ERYTHROCYTE [DISTWIDTH] IN BLOOD BY AUTOMATED COUNT: 12.7 % (ref 11.6–14.5)
GLUCOSE SERPL-MCNC: 143 MG/DL (ref 74–99)
HCT VFR BLD AUTO: 37.8 % (ref 36–48)
HGB BLD-MCNC: 12.2 G/DL (ref 13–16)
IMM GRANULOCYTES # BLD AUTO: 0 K/UL (ref 0–0.04)
IMM GRANULOCYTES NFR BLD AUTO: 0 % (ref 0–0.5)
LYMPHOCYTES # BLD: 0.92 K/UL (ref 0.9–3.6)
LYMPHOCYTES NFR BLD: 12 % (ref 21–52)
MAGNESIUM SERPL-MCNC: 1.4 MG/DL (ref 1.6–2.6)
MCH RBC QN AUTO: 31.2 PG (ref 24–34)
MCHC RBC AUTO-ENTMCNC: 32.3 G/DL (ref 31–37)
MCV RBC AUTO: 96.7 FL (ref 78–100)
MICROALBUMIN UR-MCNC: 3.6 MG/DL (ref 0–3)
MICROALBUMIN/CREAT UR-RTO: 109 MG/G (ref 0–30)
MONOCYTES # BLD: 0.46 K/UL (ref 0.05–1.2)
MONOCYTES NFR BLD: 6 % (ref 3–10)
NEUTS SEG # BLD: 5.86 K/UL (ref 1.8–8)
NEUTS SEG NFR BLD: 76 % (ref 40–73)
NRBC # BLD: 0 K/UL (ref 0–0.01)
NRBC BLD-RTO: 0 PER 100 WBC
PHOSPHATE SERPL-MCNC: 3.3 MG/DL (ref 2.5–4.9)
PLATELET # BLD AUTO: 128 K/UL (ref 135–420)
PLATELET COMMENT: ABNORMAL
PMV BLD AUTO: 12.3 FL (ref 9.2–11.8)
POTASSIUM SERPL-SCNC: 3.4 MMOL/L (ref 3.5–5.5)
PROT UR-MCNC: 13 MG/DL
PTH-INTACT SERPL-MCNC: 349.9 PG/ML (ref 18.4–88)
RBC # BLD AUTO: 3.91 M/UL (ref 4.35–5.65)
RBC MORPH BLD: ABNORMAL
SODIUM SERPL-SCNC: 137 MMOL/L (ref 136–145)
URATE SERPL-MCNC: 8.9 MG/DL (ref 2.6–7.2)
WBC # BLD AUTO: 7.7 K/UL (ref 4.6–13.2)

## 2025-02-17 PROCEDURE — 84075 ASSAY ALKALINE PHOSPHATASE: CPT

## 2025-02-17 PROCEDURE — 84080 ASSAY ALKALINE PHOSPHATASES: CPT

## 2025-02-17 PROCEDURE — 84550 ASSAY OF BLOOD/URIC ACID: CPT

## 2025-02-17 PROCEDURE — 80069 RENAL FUNCTION PANEL: CPT

## 2025-02-17 PROCEDURE — 82043 UR ALBUMIN QUANTITATIVE: CPT

## 2025-02-17 PROCEDURE — 84156 ASSAY OF PROTEIN URINE: CPT

## 2025-02-17 PROCEDURE — 36415 COLL VENOUS BLD VENIPUNCTURE: CPT

## 2025-02-17 PROCEDURE — 85025 COMPLETE CBC W/AUTO DIFF WBC: CPT

## 2025-02-17 PROCEDURE — 83970 ASSAY OF PARATHORMONE: CPT

## 2025-02-17 PROCEDURE — 82570 ASSAY OF URINE CREATININE: CPT

## 2025-02-17 PROCEDURE — 82306 VITAMIN D 25 HYDROXY: CPT

## 2025-02-17 PROCEDURE — 83735 ASSAY OF MAGNESIUM: CPT

## 2025-02-22 LAB
ALP BONE CFR SERPL: 58 % (ref 12–68)
ALP INTEST CFR SERPL: 0 % (ref 0–18)
ALP LIVER CFR SERPL: 42 % (ref 13–88)
ALP SERPL-CCNC: 120 IU/L (ref 44–121)

## 2025-03-11 ENCOUNTER — HOSPITAL ENCOUNTER (OUTPATIENT)
Facility: HOSPITAL | Age: 80
Discharge: HOME | End: 2025-03-14
Attending: UROLOGY
Payer: MEDICARE

## 2025-03-11 DIAGNOSIS — N28.89 RENAL MASS, LEFT: ICD-10-CM

## 2025-03-11 PROCEDURE — 74181 MRI ABDOMEN W/O CONTRAST: CPT

## 2025-03-14 ENCOUNTER — RESULTS FOLLOW-UP (OUTPATIENT)
Facility: HOSPITAL | Age: 80
End: 2025-03-14

## 2025-03-14 DIAGNOSIS — Q45.3 PANCREATIC ABNORMALITY: Primary | ICD-10-CM

## 2025-03-14 NOTE — TELEPHONE ENCOUNTER
I spoke with pt and he was aware of everything. Pt stated that he didn't have a GI Dr. I informed him I will put a order in he thanked me and call was ended.

## 2025-03-14 NOTE — RESULT ENCOUNTER NOTE
MRI looks really good for kidneys.  NO MASS which is great.  He can be telehealth to review this.     There is an abnormality in the pancreas and he needs to see GI for this.  I would not delay referral - needs to see GI.

## 2025-03-14 NOTE — TELEPHONE ENCOUNTER
----- Message from Dr. Ryan Villalta MD sent at 3/14/2025  8:12 AM EDT -----  MRI looks really good for kidneys.  NO MASS which is great.  He can be telehealth to review this.     There is an abnormality in the pancreas and he needs to see GI for this.  I would not delay referral - needs to see GI.

## 2025-04-10 ENCOUNTER — OFFICE VISIT (OUTPATIENT)
Age: 80
End: 2025-04-10

## 2025-04-10 VITALS — HEIGHT: 64 IN | WEIGHT: 185 LBS | BODY MASS INDEX: 31.58 KG/M2

## 2025-04-10 DIAGNOSIS — M25.561 RIGHT KNEE PAIN, UNSPECIFIED CHRONICITY: Primary | ICD-10-CM

## 2025-04-10 DIAGNOSIS — M17.11 OSTEOARTHRITIS OF RIGHT KNEE, UNSPECIFIED OSTEOARTHRITIS TYPE: ICD-10-CM

## 2025-04-10 NOTE — PROGRESS NOTES
Name: Sonido Capellan    : 1945     [facility]  [address], [address]  [place] [address]  Dept: [phone]  Dept Fax: [phone]     Chief Complaint   Patient presents with    Knee Pain    Ankle Pain        Ht 1.626 m (5' 4\")   Wt 83.9 kg (185 lb)   BMI 31.76 kg/m²      Allergies   Allergen Reactions    Ampicillin Hives     Became allergic 30 years ago.    Diphenoxylate-Atropine Shortness Of Breath    Cephalexin Itching    Penicillin V Potassium Hives        Current Outpatient Medications   Medication Sig Dispense Refill    tamsulosin (FLOMAX) 0.4 MG capsule Take 1 capsule by mouth daily 90 capsule 4    FARXIGA 10 MG tablet Take 1 tablet by mouth Every Day      cyclobenzaprine (FLEXERIL) 10 MG tablet 1 tablet Orally once at night prn for 10 days      levoFLOXacin (LEVAQUIN) 500 MG tablet Take 1 tablet by mouth daily      furosemide (LASIX) 40 MG tablet Take 1 tablet by mouth daily      BISACODYL PO Take 5 mg by mouth as needed (constipation).      cyanocobalamin 100 MCG tablet Take 1,000 mcg by mouth daily. (Patient not taking: Reported on 2024)      acetaminophen (TYLENOL) 500 MG tablet Take 1 tablet by mouth every 4 hours as needed for Pain      sodium chloride (OCEAN) 0.65 % nasal spray 2 sprays by Nasal route as needed for Congestion      Multiple Vitamins-Minerals (ONE A DAY MENS VITACRAVES PO) Take 1 tablet by mouth daily.      Heparin Sod, Pork, Lock Flush (HEPARIN, PORCINE, LOCK FLUSH IV) Infuse 5 mLs intravenously daily.      Sodium Chloride Flush (NORMAL SALINE FLUSH IV) Infuse 10 mLs intravenously daily.      ceFEPIme (MAXIPIME) 2 g injection Infuse 2 g intravenously every 12 hours infuse over 3-5 mins      captopril (CAPOTEN) 12.5 MG tablet Take 0.5 tablets by mouth 2 times daily 30 tablet 0    gabapentin (NEURONTIN) 100 MG capsule Take 1 capsule by mouth 3 times daily for 30 days. Max Daily

## 2025-07-21 ENCOUNTER — HOSPITAL ENCOUNTER (OUTPATIENT)
Facility: HOSPITAL | Age: 80
Discharge: HOME | End: 2025-07-24
Payer: MEDICARE

## 2025-07-21 ENCOUNTER — TRANSCRIBE ORDERS (OUTPATIENT)
Facility: HOSPITAL | Age: 80
End: 2025-07-21

## 2025-07-21 DIAGNOSIS — Q45.3 ECTOPIC PANCREATIC TISSUE IN STOMACH: Primary | ICD-10-CM

## 2025-07-21 DIAGNOSIS — N18.32 CHRONIC KIDNEY DISEASE (CKD) STAGE G3B/A1, MODERATELY DECREASED GLOMERULAR FILTRATION RATE (GFR) BETWEEN 30-44 ML/MIN/1.73 SQUARE METER AND ALBUMINURIA CREATININE RATIO LESS THAN 30 MG/G (HCC): Primary | ICD-10-CM

## 2025-07-21 DIAGNOSIS — Q45.3 ECTOPIC PANCREATIC TISSUE IN STOMACH: ICD-10-CM

## 2025-07-21 DIAGNOSIS — N18.32 CHRONIC KIDNEY DISEASE (CKD) STAGE G3B/A1, MODERATELY DECREASED GLOMERULAR FILTRATION RATE (GFR) BETWEEN 30-44 ML/MIN/1.73 SQUARE METER AND ALBUMINURIA CREATININE RATIO LESS THAN 30 MG/G (HCC): ICD-10-CM

## 2025-07-21 DIAGNOSIS — E13.69 OTHER SPECIFIED DIABETES MELLITUS WITH OTHER SPECIFIED COMPLICATION, UNSPECIFIED WHETHER LONG TERM INSULIN USE (HCC): ICD-10-CM

## 2025-07-21 LAB
25(OH)D3 SERPL-MCNC: 18 NG/ML (ref 30–100)
ALBUMIN SERPL-MCNC: 3.6 G/DL (ref 3.4–5)
ALBUMIN SERPL-MCNC: 3.6 G/DL (ref 3.4–5)
ALBUMIN/GLOB SERPL: 1.2 (ref 0.8–1.7)
ALP SERPL-CCNC: 95 U/L (ref 45–117)
ALP SERPL-CCNC: 97 U/L (ref 45–117)
ALT SERPL-CCNC: 13 U/L (ref 10–50)
ANION GAP SERPL CALC-SCNC: 13 MMOL/L (ref 3–18)
APPEARANCE UR: CLEAR
AST SERPL-CCNC: 22 U/L (ref 10–38)
BACTERIA URNS QL MICRO: ABNORMAL /HPF
BASOPHILS # BLD: 0.07 K/UL (ref 0–0.1)
BASOPHILS NFR BLD: 0.8 % (ref 0–2)
BILIRUB DIRECT SERPL-MCNC: 0.4 MG/DL (ref 0–0.2)
BILIRUB SERPL-MCNC: 0.9 MG/DL (ref 0.2–1)
BILIRUB UR QL: NEGATIVE
BUN SERPL-MCNC: 29 MG/DL (ref 6–23)
BUN/CREAT SERPL: 16 (ref 12–20)
CALCIUM SERPL-MCNC: 8.6 MG/DL (ref 8.5–10.1)
CALCIUM SERPL-MCNC: 8.6 MG/DL (ref 8.5–10.1)
CHLORIDE SERPL-SCNC: 103 MMOL/L (ref 98–107)
CO2 SERPL-SCNC: 24 MMOL/L (ref 21–32)
COLOR UR: YELLOW
CREAT SERPL-MCNC: 1.83 MG/DL (ref 0.6–1.3)
CREAT UR-MCNC: 64.4 MG/DL (ref 30–125)
CREAT UR-MCNC: 66 MG/DL (ref 30–125)
CREAT UR-MCNC: 66.2 MG/DL (ref 30–125)
DIFFERENTIAL METHOD BLD: ABNORMAL
EOSINOPHIL # BLD: 0 K/UL (ref 0–0.4)
EOSINOPHIL NFR BLD: 0 % (ref 0–5)
EPITH CASTS URNS QL MICRO: ABNORMAL /LPF (ref 0–5)
ERYTHROCYTE [DISTWIDTH] IN BLOOD BY AUTOMATED COUNT: 13.2 % (ref 11.6–14.5)
GLOBULIN SER CALC-MCNC: 3 G/DL (ref 2–4)
GLUCOSE SERPL-MCNC: 132 MG/DL (ref 74–108)
GLUCOSE UR STRIP.AUTO-MCNC: 500 MG/DL
HCT VFR BLD AUTO: 36.2 % (ref 36–48)
HGB BLD-MCNC: 11.8 G/DL (ref 13–16)
HGB UR QL STRIP: NEGATIVE
IMM GRANULOCYTES # BLD AUTO: 0.04 K/UL (ref 0–0.04)
IMM GRANULOCYTES NFR BLD AUTO: 0.5 % (ref 0–0.5)
KETONES UR QL STRIP.AUTO: NEGATIVE MG/DL
LEUKOCYTE ESTERASE UR QL STRIP.AUTO: NEGATIVE
LIPASE SERPL-CCNC: 18 U/L (ref 13–75)
LYMPHOCYTES # BLD: 1.53 K/UL (ref 0.9–3.6)
LYMPHOCYTES NFR BLD: 17.4 % (ref 21–52)
MAGNESIUM SERPL-MCNC: 1.9 MG/DL (ref 1.6–2.6)
MCH RBC QN AUTO: 32.2 PG (ref 24–34)
MCHC RBC AUTO-ENTMCNC: 32.6 G/DL (ref 31–37)
MCV RBC AUTO: 98.6 FL (ref 78–100)
MICROALBUMIN UR-MCNC: 5.6 MG/DL (ref 0–3)
MICROALBUMIN/CREAT UR-RTO: 87 MG/G (ref 0–30)
MONOCYTES # BLD: 0.56 K/UL (ref 0.05–1.2)
MONOCYTES NFR BLD: 6.4 % (ref 3–10)
NEUTS SEG # BLD: 6.57 K/UL (ref 1.8–8)
NEUTS SEG NFR BLD: 74.9 % (ref 40–73)
NITRITE UR QL STRIP.AUTO: NEGATIVE
NRBC # BLD: 0 K/UL (ref 0–0.01)
NRBC BLD-RTO: 0 PER 100 WBC
PH UR STRIP: 5 (ref 5–8)
PHOSPHATE SERPL-MCNC: 3.4 MG/DL (ref 2.5–4.9)
PLATELET # BLD AUTO: 129 K/UL (ref 135–420)
PMV BLD AUTO: 11.8 FL (ref 9.2–11.8)
POTASSIUM SERPL-SCNC: 3.6 MMOL/L (ref 3.5–5.5)
PROT SERPL-MCNC: 6.6 G/DL (ref 6.4–8.2)
PROT UR STRIP-MCNC: NEGATIVE MG/DL
PROT UR-MCNC: 15 MG/DL (ref 0–12)
PROT UR-MCNC: 18 MG/DL (ref 0–12)
PROT/CREAT UR-RTO: 0.2
PTH-INTACT SERPL-MCNC: 178 PG/ML (ref 15–65)
RBC # BLD AUTO: 3.67 M/UL (ref 4.35–5.65)
RBC #/AREA URNS HPF: ABNORMAL /HPF (ref 0–5)
SODIUM SERPL-SCNC: 140 MMOL/L (ref 136–145)
SP GR UR REFRACTOMETRY: 1.01 (ref 1–1.03)
URATE SERPL-MCNC: 9.8 MG/DL (ref 2.6–7.2)
UROBILINOGEN UR QL STRIP.AUTO: 0.2 EU/DL (ref 0.2–1)
WBC # BLD AUTO: 8.8 K/UL (ref 4.6–13.2)
WBC URNS QL MICRO: ABNORMAL /HPF (ref 0–5)

## 2025-07-21 PROCEDURE — 84156 ASSAY OF PROTEIN URINE: CPT

## 2025-07-21 PROCEDURE — 83970 ASSAY OF PARATHORMONE: CPT

## 2025-07-21 PROCEDURE — 82570 ASSAY OF URINE CREATININE: CPT

## 2025-07-21 PROCEDURE — 82043 UR ALBUMIN QUANTITATIVE: CPT

## 2025-07-21 PROCEDURE — 85025 COMPLETE CBC W/AUTO DIFF WBC: CPT

## 2025-07-21 PROCEDURE — 80069 RENAL FUNCTION PANEL: CPT

## 2025-07-21 PROCEDURE — 36415 COLL VENOUS BLD VENIPUNCTURE: CPT

## 2025-07-21 PROCEDURE — 84550 ASSAY OF BLOOD/URIC ACID: CPT

## 2025-07-21 PROCEDURE — 84075 ASSAY ALKALINE PHOSPHATASE: CPT

## 2025-07-21 PROCEDURE — 83690 ASSAY OF LIPASE: CPT

## 2025-07-21 PROCEDURE — 83735 ASSAY OF MAGNESIUM: CPT

## 2025-07-21 PROCEDURE — 80076 HEPATIC FUNCTION PANEL: CPT

## 2025-07-21 PROCEDURE — 81001 URINALYSIS AUTO W/SCOPE: CPT

## 2025-07-21 PROCEDURE — 82306 VITAMIN D 25 HYDROXY: CPT
